# Patient Record
Sex: FEMALE | Employment: FULL TIME | URBAN - METROPOLITAN AREA
[De-identification: names, ages, dates, MRNs, and addresses within clinical notes are randomized per-mention and may not be internally consistent; named-entity substitution may affect disease eponyms.]

---

## 2017-01-16 ENCOUNTER — OFFICE VISIT (OUTPATIENT)
Dept: FAMILY MEDICINE | Facility: CLINIC | Age: 47
End: 2017-01-16
Payer: COMMERCIAL

## 2017-01-16 VITALS
HEART RATE: 75 BPM | BODY MASS INDEX: 29.25 KG/M2 | HEIGHT: 66 IN | WEIGHT: 182 LBS | OXYGEN SATURATION: 98 % | DIASTOLIC BLOOD PRESSURE: 82 MMHG | SYSTOLIC BLOOD PRESSURE: 134 MMHG | TEMPERATURE: 98.8 F

## 2017-01-16 DIAGNOSIS — J01.00 ACUTE MAXILLARY SINUSITIS, RECURRENCE NOT SPECIFIED: Primary | ICD-10-CM

## 2017-01-16 PROCEDURE — 99213 OFFICE O/P EST LOW 20 MIN: CPT | Performed by: NURSE PRACTITIONER

## 2017-01-16 NOTE — NURSING NOTE
"Chief Complaint   Patient presents with     Sick       Initial /82 mmHg  Pulse 75  Temp(Src) 98.8  F (37.1  C) (Oral)  Ht 5' 5.5\" (1.664 m)  Wt 182 lb (82.555 kg)  BMI 29.82 kg/m2  SpO2 98% Estimated body mass index is 29.82 kg/(m^2) as calculated from the following:    Height as of this encounter: 5' 5.5\" (1.664 m).    Weight as of this encounter: 182 lb (82.555 kg).  BP completed using cuff size: kev Torre MA       "

## 2017-01-16 NOTE — PATIENT INSTRUCTIONS
Sinusitis (Antibiotic Treatment)    The sinuses are air-filled spaces within the bones of the face. They connect to the inside of the nose. Sinusitis is an inflammation of the tissue lining the sinus cavity. Sinus inflammation can occur during a cold. It can also be due to allergies to pollens and other particles in the air. Sinusitis can cause symptoms of sinus congestion and fullness. A sinus infection causes fever, headache and facial pain. There is often green or yellow drainage from the nose or into the back of the throat (post-nasal drip). You have been given antibiotics to treat this condition.  Home care:    Take the full course of antibiotics as instructed. Do not stop taking them, even if you feel better.    Drink plenty of water, hot tea, and other liquids. This may help thin mucus. It also may promote sinus drainage.    Heat may help soothe painful areas of the face. Use a towel soaked in hot water. Or,  the shower and direct the hot spray onto your face. Using a vaporizer along with a menthol rub at night may also help.     An expectorant containing guaifenesin may help thin the mucus and promote drainage from the sinuses.    Over-the-counter decongestants may be used unless a similar medicine was prescribed. Nasal sprays work the fastest. Use one that contains phenylephrine or oxymetazoline. First blow the nose gently. Then use the spray. Do not use these medicines more often than directed on the label or symptoms may get worse. You may also use tablets containing pseudoephedrine. Avoid products that combine ingredients, because side effects may be increased. Read labels. You can also ask the pharmacist for help. (NOTE: Persons with high blood pressure should not use decongestants. They can raise blood pressure.)    Over-the-counter antihistamines may help if allergies contributed to your sinusitis.      Do not use nasal rinses or irrigation during an acute sinus infection, unless told to by  your health care provider. Rinsing may spread the infection to other sinuses.    Use acetaminophen or ibuprofen to control pain, unless another pain medicine was prescribed. (If you have chronic liver or kidney disease or ever had a stomach ulcer, talk with your doctor before using these medicines. Aspirin should never be used in anyone under 18 years of age who is ill with a fever. It may cause severe liver damage.)    Don't smoke. This can worsen symptoms.  Follow-up care  Follow up with your healthcare provider or our staff if you are not improving within the next week.  When to seek medical advice  Call your healthcare provider if any of these occur:    Facial pain or headache becoming more severe    Stiff neck    Unusual drowsiness or confusion    Swelling of the forehead or eyelids    Vision problems, including blurred or double vision    Fever of 100.4 F (38 C) or higher, or as directed by your healthcare provider    Seizure    Breathing problems    Symptoms not resolving within 10 days    4690-4197 [employer]. [address] [place] [address], [place] [address]. All rights reserved. This information is not intended as a substitute for professional medical care. Always follow your healthcare professional's instructions.

## 2017-01-16 NOTE — MR AVS SNAPSHOT
After Visit Summary   1/16/2017    Janice Kern    MRN: [id number]           Patient Information     Date Of Birth          1970        Visit Information        Provider Department      1/16/2017 3:00 PM Nimisha Franks [facility]        Today's Diagnoses     Acute maxillary sinusitis, recurrence not specified    -  1       Care Instructions      Sinusitis (Antibiotic Treatment)    The sinuses are air-filled spaces within the bones of the face. They connect to the inside of the nose. Sinusitis is an inflammation of the tissue lining the sinus cavity. Sinus inflammation can occur during a cold. It can also be due to allergies to pollens and other particles in the air. Sinusitis can cause symptoms of sinus congestion and fullness. A sinus infection causes fever, headache and facial pain. There is often green or yellow drainage from the nose or into the back of the throat (post-nasal drip). You have been given antibiotics to treat this condition.  Home care:    Take the full course of antibiotics as instructed. Do not stop taking them, even if you feel better.    Drink plenty of water, hot tea, and other liquids. This may help thin mucus. It also may promote sinus drainage.    Heat may help soothe painful areas of the face. Use a towel soaked in hot water. Or,  the shower and direct the hot spray onto your face. Using a vaporizer along with a menthol rub at night may also help.     An expectorant containing guaifenesin may help thin the mucus and promote drainage from the sinuses.    Over-the-counter decongestants may be used unless a similar medicine was prescribed. Nasal sprays work the fastest. Use one that contains phenylephrine or oxymetazoline. First blow the nose gently. Then use the spray. Do not use these medicines more often than directed on the label or symptoms may get worse. You may also use tablets containing pseudoephedrine. Avoid  products that combine ingredients, because side effects may be increased. Read labels. You can also ask the pharmacist for help. (NOTE: Persons with high blood pressure should not use decongestants. They can raise blood pressure.)    Over-the-counter antihistamines may help if allergies contributed to your sinusitis.      Do not use nasal rinses or irrigation during an acute sinus infection, unless told to by your health care provider. Rinsing may spread the infection to other sinuses.    Use acetaminophen or ibuprofen to control pain, unless another pain medicine was prescribed. (If you have chronic liver or kidney disease or ever had a stomach ulcer, talk with your doctor before using these medicines. Aspirin should never be used in anyone under 18 years of age who is ill with a fever. It may cause severe liver damage.)    Don't smoke. This can worsen symptoms.  Follow-up care  Follow up with your healthcare provider or our staff if you are not improving within the next week.  When to seek medical advice  Call your healthcare provider if any of these occur:    Facial pain or headache becoming more severe    Stiff neck    Unusual drowsiness or confusion    Swelling of the forehead or eyelids    Vision problems, including blurred or double vision    Fever of 100.4 F (38 C) or higher, or as directed by your healthcare provider    Seizure    Breathing problems    Symptoms not resolving within 10 days    6975-6071 [employer]. [address] [place] [address], [place] [address]. All rights reserved. This information is not intended as a substitute for professional medical care. Always follow your healthcare professional's instructions.              Follow-ups after your visit        Who to contact     If you have questions or need follow up information about today's clinic visit or your schedule please contact [facility] directly at [phone].  Normal or non-critical lab and imaging results  "will be communicated to you by e|tabhart, letter or phone within 4 business days after the clinic has received the results. If you do not hear from us within 7 days, please contact the clinic through [employer] or phone. If you have a critical or abnormal lab result, we will notify you by phone as soon as possible.  Submit refill requests through [employer] or call your pharmacy and they will forward the refill request to us. Please allow 3 business days for your refill to be completed.          Additional Information About Your Visit        [employer] Information     [employer] gives you secure access to your electronic health record. If you see a primary care provider, you can also send messages to your care team and make appointments. If you have questions, please call your primary care clinic.  If you do not have a primary care provider, please call [phone] and they will assist you.        Care EveryWhere ID     This is your Care EveryWhere ID. This could be used by other organizations to access your [place] medical records  [id number]        Your Vitals Were     Pulse Temperature Height BMI (Body Mass Index) Pulse Oximetry       75 98.8  F (37.1  C) (Oral) 5' 5.5\" (1.664 m) 29.82 kg/m2 98%        Blood Pressure from Last 3 Encounters:   01/16/17 134/82   11/10/16 139/98   11/02/16 138/92    Weight from Last 3 Encounters:   01/16/17 182 lb (82.555 kg)   11/10/16 178 lb (80.74 kg)   11/02/16 178 lb (80.74 kg)              Today, you had the following     No orders found for display         Today's Medication Changes          These changes are accurate as of: 1/16/17  3:37 PM.  If you have any questions, ask your nurse or doctor.               Start taking these medicines.        Dose/Directions    amoxicillin-clavulanate 875-125 MG per tablet   Commonly known as:  AUGMENTIN   Used for:  Acute maxillary sinusitis, recurrence not specified   Started by:  Nimisha Franks APRN CNP        Dose:  1 tablet   Take 1 " tablet by mouth 2 times daily   Quantity:  20 tablet   Refills:  0            Where to get your medicines      These medications were sent to [id number] - [facility] [address] [place], [place] [address] [facility] [address]    Hours:  24-hours Phone:  [phone]    - amoxicillin-clavulanate 875-125 MG per tablet             Primary Care Provider Office Phone # Fax #    Yaneth Scott [phone]       [facility] [address]   [facility] [address]        Thank you!     Thank you for choosing [facility]  for your care. Our goal is always to provide you with excellent care. Hearing back from our patients is one way we can continue to improve our services. Please take a few minutes to complete the written survey that you may receive in the mail after your visit with us. Thank you!             Your Updated Medication List - Protect others around you: Learn how to safely use, store and throw away your medicines at www.disposemymeds.org.          This list is accurate as of: 1/16/17  3:37 PM.  Always use your most recent med list.                   Brand Name Dispense Instructions for use    amoxicillin-clavulanate 875-125 MG per tablet    AUGMENTIN    20 tablet    Take 1 tablet by mouth 2 times daily       HERBALS          SYNTHROID 25 MCG tablet   Generic drug:  levothyroxine     30 tablet    Take 250 mcg by mouth daily

## 2017-01-16 NOTE — PROGRESS NOTES
"  SUBJECTIVE:                                                    Janice Kern is a 46 year old female who presents to clinic today for the following health issues:      RESPIRATORY SYMPTOMS      Duration: 2016    Description  nasal congestion, rhinorrhea, facial pain/pressure, cough, fever, headache and ear pain. Symptoms are not resolving. Zicam and nettipot aren't helping. Coughing \"all night long.\"    Severity: moderate    Accompanying signs and symptoms: None    History (predisposing factors):  none    Precipitating or alleviating factors: None    Therapies tried and outcome:  Sudafed, essential oils, zycam,        -------------------------------------    Problem list and histories reviewed & adjusted, as indicated.  Additional history: as documented    Patient Active Problem List   Diagnosis     Hashimoto's thyroiditis     CARDIOVASCULAR SCREENING; LDL GOAL LESS THAN 160     Moderate major depression (H)     Migraine     Fevers, unknown origin     Cervical high risk HPV (human papillomavirus) test positive     Menopausal syndrome (hot flashes)     Overweight     Major depressive disorder, recurrent episode, moderate (H)     Anxiety     Past Surgical History   Procedure Laterality Date     Hc excision breast lesion, open >=1  2001     C analgesia,epidural,labor &   , 2005     Laparoscopic appendectomy  2013     Procedure: LAPAROSCOPIC APPENDECTOMY;  Laparoscopic Appendectomy;  Surgeon: Mercy Leal MD;  Location:  OR       Social History   Substance Use Topics     Smoking status: Former Smoker     Smokeless tobacco: Never Used      Comment: quit for 4 years.     Alcohol Use: Yes      Comment: occasionally     Family History   Problem Relation Age of Onset     C.A.D. Maternal Grandfather      HEART DISEASE Maternal Grandfather      DIABETES Paternal Grandfather      CEREBROVASCULAR DISEASE Maternal Grandfather      CANCER Maternal Grandmother      lung " "    CANCER Paternal Grandmother      stomach     Asthma Mother      Psychotic Disorder Mother      Musculoskeletal Disorder Mother      fibromyalgia         Current Outpatient Prescriptions   Medication Sig Dispense Refill     HERBALS        levothyroxine (SYNTHROID) 25 MCG tablet Take 250 mcg by mouth daily  30 tablet 6     FLUoxetine (PROZAC) 10 MG capsule Take one capsule daily for five days then two capsules daily 60 capsule 1     Allergies   Allergen Reactions     Erythromycin Hives     Tape [Adhesive Tape]      Colth tape is the one that gives her the reaction.Burn type reacton , raw.     Penicillin [Esters]      Family alergies to the antibiotic. So it was never given to her.     BP Readings from Last 3 Encounters:   01/16/17 134/82   11/10/16 139/98   11/02/16 138/92    Wt Readings from Last 3 Encounters:   01/16/17 182 lb (82.555 kg)   11/10/16 178 lb (80.74 kg)   11/02/16 178 lb (80.74 kg)                  Labs reviewed in EPIC  Problem list, Medication list, Allergies, and Medical/Social/Surgical histories reviewed in [facility] and updated as appropriate.    ROS:  Constitutional, HEENT, cardiovascular, pulmonary, gi and gu systems are negative, except as otherwise noted.    OBJECTIVE:                                                    /82 mmHg  Pulse 75  Temp(Src) 98.8  F (37.1  C) (Oral)  Ht 5' 5.5\" (1.664 m)  Wt 182 lb (82.555 kg)  BMI 29.82 kg/m2  SpO2 98%  Body mass index is 29.82 kg/(m^2).  General: healthy, alert and moderate distress  Skin: warm and dry.  HEENT:   Ears/TM's: bilateral TM fluid noted  Neck: bilateral anterior cervical nodes enlarged  Nares: congested. Nasal voice.  Posterior orpharynx: mildly erythemetous  Sinuses: + tenderness with palpation  Lungs: chest clear no tachypnea, retractions or cyanosis  Heart: S1, S2 normal, no murmur, no gallop, rate regular       ASSESSMENT/PLAN:                                                    (J01.00) Acute maxillary sinusitis, recurrence " not specified  (primary encounter diagnosis)  Comment: acute  Plan: amoxicillin-clavulanate (AUGMENTIN) 875-125 MG         per tablet        Take antibiotic as prescribed. Symptomatic management (push fluids, good nutrition, MVI if indicated, OTC decongestants, etc). Return prn any problems, questions, or concerns.          HA Boudreaux [facility]

## 2017-01-17 ASSESSMENT — PATIENT HEALTH QUESTIONNAIRE - PHQ9: SUM OF ALL RESPONSES TO PHQ QUESTIONS 1-9: 8

## 2017-01-24 ENCOUNTER — TRANSFERRED RECORDS (OUTPATIENT)
Dept: HEALTH INFORMATION MANAGEMENT | Facility: CLINIC | Age: 47
End: 2017-01-24

## 2017-04-03 ENCOUNTER — TELEPHONE (OUTPATIENT)
Dept: FAMILY MEDICINE | Facility: CLINIC | Age: 47
End: 2017-04-03

## 2017-04-03 ENCOUNTER — OFFICE VISIT (OUTPATIENT)
Dept: FAMILY MEDICINE | Facility: CLINIC | Age: 47
End: 2017-04-03
Payer: COMMERCIAL

## 2017-04-03 VITALS
HEIGHT: 66 IN | DIASTOLIC BLOOD PRESSURE: 86 MMHG | TEMPERATURE: 98.2 F | WEIGHT: 181.25 LBS | SYSTOLIC BLOOD PRESSURE: 120 MMHG | OXYGEN SATURATION: 100 % | HEART RATE: 72 BPM | BODY MASS INDEX: 29.13 KG/M2

## 2017-04-03 DIAGNOSIS — R10.13 ABDOMINAL PAIN, EPIGASTRIC: ICD-10-CM

## 2017-04-03 DIAGNOSIS — R14.2 FLATULENCE, ERUCTATION, AND GAS PAIN: Primary | ICD-10-CM

## 2017-04-03 DIAGNOSIS — R19.7 DIARRHEA, UNSPECIFIED TYPE: ICD-10-CM

## 2017-04-03 DIAGNOSIS — R14.1 FLATULENCE, ERUCTATION, AND GAS PAIN: Primary | ICD-10-CM

## 2017-04-03 DIAGNOSIS — R14.3 FLATULENCE, ERUCTATION, AND GAS PAIN: Primary | ICD-10-CM

## 2017-04-03 DIAGNOSIS — R19.5 DARK STOOLS: ICD-10-CM

## 2017-04-03 LAB
BASOPHILS # BLD AUTO: 0 10E9/L (ref 0–0.2)
BASOPHILS NFR BLD AUTO: 0.3 %
DIFFERENTIAL METHOD BLD: NORMAL
EOSINOPHIL # BLD AUTO: 0.1 10E9/L (ref 0–0.7)
EOSINOPHIL NFR BLD AUTO: 1.3 %
ERYTHROCYTE [DISTWIDTH] IN BLOOD BY AUTOMATED COUNT: 12.5 % (ref 10–15)
HCT VFR BLD AUTO: 42.1 % (ref 35–47)
HGB BLD-MCNC: 14.2 G/DL (ref 11.7–15.7)
LYMPHOCYTES # BLD AUTO: 2.6 10E9/L (ref 0.8–5.3)
LYMPHOCYTES NFR BLD AUTO: 43.7 %
MCH RBC QN AUTO: 29.6 PG (ref 26.5–33)
MCHC RBC AUTO-ENTMCNC: 33.7 G/DL (ref 31.5–36.5)
MCV RBC AUTO: 88 FL (ref 78–100)
MONOCYTES # BLD AUTO: 0.5 10E9/L (ref 0–1.3)
MONOCYTES NFR BLD AUTO: 8.2 %
NEUTROPHILS # BLD AUTO: 2.8 10E9/L (ref 1.6–8.3)
NEUTROPHILS NFR BLD AUTO: 46.5 %
PLATELET # BLD AUTO: 253 10E9/L (ref 150–450)
RBC # BLD AUTO: 4.8 10E12/L (ref 3.8–5.2)
WBC # BLD AUTO: 6 10E9/L (ref 4–11)

## 2017-04-03 PROCEDURE — 99214 OFFICE O/P EST MOD 30 MIN: CPT | Performed by: PHYSICIAN ASSISTANT

## 2017-04-03 PROCEDURE — 85025 COMPLETE CBC W/AUTO DIFF WBC: CPT | Performed by: PHYSICIAN ASSISTANT

## 2017-04-03 PROCEDURE — 36415 COLL VENOUS BLD VENIPUNCTURE: CPT | Performed by: PHYSICIAN ASSISTANT

## 2017-04-03 PROCEDURE — 80053 COMPREHEN METABOLIC PANEL: CPT | Performed by: PHYSICIAN ASSISTANT

## 2017-04-03 NOTE — TELEPHONE ENCOUNTER
Pt denies any cp.  She says she has had intermittent abd pain for the last 2 weeks but now has been constant for the last 2 days.  She rates pain 6-7/10. She says it is not severe when asked.  She says she would have gone into ER if it was and just looking for apt.  Scheduled her in this am.  Shelbi Hartman RN

## 2017-04-03 NOTE — MR AVS SNAPSHOT
After Visit Summary   4/3/2017    Janice Kern    MRN: [id number]           Patient Information     Date Of Birth          1970        Visit Information        Provider Department      4/3/2017 11:20 AM Rohith Baron PA-C [facility]        Today's Diagnoses     Flatulence, eructation, and gas pain    -  1    Abdominal pain, epigastric        Diarrhea, unspecified type        Dark stools           Follow-ups after your visit        Future tests that were ordered for you today     Open Future Orders        Priority Expected Expires Ordered    H Pylori antigen, stool Routine  5/3/2017 4/3/2017    Occult blood fecal HGB immuno Routine  4/3/2018 4/3/2017    US Abdomen Limited Routine  5/18/2017 4/3/2017            Who to contact     If you have questions or need follow up information about today's clinic visit or your schedule please contact [facility] directly at [phone].  Normal or non-critical lab and imaging results will be communicated to you by [employer]ash[employer]art, letter or phone within 4 business days after the clinic has received the results. If you do not hear from us within 7 days, please contact the clinic through [employer] or phone. If you have a critical or abnormal lab result, we will notify you by phone as soon as possible.  Submit refill requests through Tabblo or call your pharmacy and they will forward the refill request to us. Please allow 3 business days for your refill to be completed.          Additional Information About Your Visit        MyChart Information     [employer] gives you secure access to your electronic health record. If you see a primary care provider, you can also send messages to your care team and make appointments. If you have questions, please call your primary care clinic.  If you do not have a primary care provider, please call [phone] and they will assist you.        Care EveryWhere ID     This is your Care EveryWhere ID.  "This could be used by other organizations to access your [place] medical records  [id number]        Your Vitals Were     Pulse Temperature Height Pulse Oximetry Breastfeeding? BMI (Body Mass Index)    72 98.2  F (36.8  C) (Tympanic) 5' 5.5\" (1.664 m) 100% No 29.7 kg/m2       Blood Pressure from Last 3 Encounters:   04/03/17 120/86   01/16/17 134/82   11/10/16 (!) 139/98    Weight from Last 3 Encounters:   04/03/17 181 lb 4 oz (82.2 kg)   01/16/17 182 lb (82.6 kg)   11/10/16 178 lb (80.7 kg)              We Performed the Following     CBC with platelets differential     Comprehensive metabolic panel     DEPRESSION ACTION PLAN (DAP)        Primary Care Provider Office Phone # Fax #    Yaneth Scott [phone]       [facility] [address] [place] [address]        Thank you!     Thank you for choosing [facility]  for your care. Our goal is always to provide you with excellent care. Hearing back from our patients is one way we can continue to improve our services. Please take a few minutes to complete the written survey that you may receive in the mail after your visit with us. Thank you!             Your Updated Medication List - Protect others around you: Learn how to safely use, store and throw away your medicines at www.disposemymeds.org.          This list is accurate as of: 4/3/17 11:49 AM.  Always use your most recent med list.                   Brand Name Dispense Instructions for use    HERBALS          SYNTHROID 25 MCG tablet   Generic drug:  levothyroxine     30 tablet    Take 250 mcg by mouth daily Take  Half on Friday Nothing on Saturdays         "

## 2017-04-03 NOTE — TELEPHONE ENCOUNTER
Reason for call:  Patient reporting a symptom    Symptom or request: Aching & pressure     Duration (how long have symptoms been present): Last couple of weeks per pt    Have you been treated for this before? Not sure    Additional comments: Janice BROOKLYNN Kern called in wishing to be advised on some chest pain she has been having with aching and pressure in her chest from rib cage area to her belly button per patient. Please call and advise. Thank you.    Phone Number patient can be reached at:  Cell number on file:    Telephone Information:   Mobile [phone]       Best Time:  N/A    Can we leave a detailed message on this number:  YES    Call taken on 4/3/2017 at 10:07 AM by Mindi Morrow

## 2017-04-03 NOTE — PROGRESS NOTES
SUBJECTIVE:                                                    Janice Kern is a 46 year old female who presents to clinic today for the following health issues:      Abdominal Pain      Duration:  roughly two weeks    Description (location/character/radiation):  Upper Abdominal pain , rib cage, lateral sides       Associated flank pain: None    Intensity:  mild, moderate, severe- sometimes sharp pain    Accompanying signs and symptoms:        Fever/Chills: no patient has hot flashes, did have chills yeretesday       Gas/Bloating: YES- bloated       Nausea/vomitting: YES- nausea       Diarrhea: YES- loose, some times 5x or 1x  stool is turned dark in color       Dysuria or Hematuria: no     History (previous similar pain/trauma/previous testing):  none    Precipitating or alleviating factors:       Pain worse with eating/BM/urination: no       Pain relieved by BM: no     Therapies tried and outcome: None    LMP:   4.5 months   All.MICHAEL Garnica          Problem list and histories reviewed & adjusted, as indicated.  Additional history: this started as just an aching nagging kind of pain.  Mild rib pain, so she though underwire bra making worse, so she did switch.  No change.  Did have a couple of episodes of acute pain, but goes away.  It can be associated with diarrhea.  Over the last couple of days, this is getting worse, and more constant.  Stools seem to be a little darker and softer.  This is unusual, as she is usually constipated.      No correlation with food, although the bad time it was after chicken salad.      She has been having more hot flashes and working with herbalist and that was helping.    BP Readings from Last 3 Encounters:   04/03/17 120/86   01/16/17 134/82   11/10/16 (!) 139/98    Wt Readings from Last 3 Encounters:   04/03/17 181 lb 4 oz (82.2 kg)   01/16/17 182 lb (82.6 kg)   11/10/16 178 lb (80.7 kg)                    Reviewed and updated as needed this visit by clinical staff      "  Reviewed and updated as needed this visit by Provider         ROS:  Constitutional, HEENT, cardiovascular, pulmonary, gi and gu systems are negative, except as otherwise noted.    OBJECTIVE:                                                    /86 (BP Location: Left arm, Patient Position: Left side, Cuff Size: Adult Large)  Pulse 72  Temp 98.2  F (36.8  C) (Tympanic)  Ht 5' 5.5\" (1.664 m)  Wt 181 lb 4 oz (82.2 kg)  SpO2 100%  Breastfeeding? No  BMI 29.7 kg/m2  Body mass index is 29.7 kg/(m^2).  GENERAL: alert and no distress  EYES: Eyes grossly normal to inspection  HENT: ear canals and TM's normal, nose and mouth without ulcers or lesions  RESP: lungs clear to auscultation - no rales, rhonchi or wheezes  CV: regular rate and rhythm, normal S1 S2, no S3 or S4, no murmur, click or rub, no peripheral edema and peripheral pulses strong  PSYCH: mentation appears normal, affect normal/bright    Diagnostic Test Results:  none      ASSESSMENT/PLAN:                                                            1. Flatulence, eructation, and gas pain  Larger differential, but will rule out gall bladder pathology.    - CBC with platelets differential  - Comprehensive metabolic panel  - US Abdomen Limited; Future    2. Abdominal pain, epigastric    - CBC with platelets differential  - Comprehensive metabolic panel  - US Abdomen Limited; Future    3. Diarrhea, unspecified type    - CBC with platelets differential  - US Abdomen Limited; Future    4. Dark stools  Some concern that this may be blood, will screen for that along with h pylori.    - H Pylori antigen, stool; Future  - Occult blood fecal HGB immuno; Future        Rohith Baron PA-C  [facility]    "

## 2017-04-03 NOTE — NURSING NOTE
"Chief Complaint   Patient presents with     Abdominal Pain     /86 (BP Location: Left arm, Patient Position: Left side, Cuff Size: Adult Large)  Pulse 72  Temp 98.2  F (36.8  C) (Tympanic)  Ht 5' 5.5\" (1.664 m)  Wt 181 lb 4 oz (82.2 kg)  SpO2 100%  Breastfeeding? No  BMI 29.7 kg/m2 Estimated body mass index is 29.7 kg/(m^2) as calculated from the following:    Height as of this encounter: 5' 5.5\" (1.664 m).    Weight as of this encounter: 181 lb 4 oz (82.2 kg).  bp completed using cuff size: large      Health Maintenance addressed:  NONE    n/a              "

## 2017-04-04 ENCOUNTER — HOSPITAL ENCOUNTER (OUTPATIENT)
Dept: ULTRASOUND IMAGING | Facility: CLINIC | Age: 47
Discharge: HOME | End: 2017-04-04
Attending: PHYSICIAN ASSISTANT | Admitting: PHYSICIAN ASSISTANT
Payer: COMMERCIAL

## 2017-04-04 DIAGNOSIS — R19.7 DIARRHEA, UNSPECIFIED TYPE: ICD-10-CM

## 2017-04-04 DIAGNOSIS — R14.1 FLATULENCE, ERUCTATION, AND GAS PAIN: ICD-10-CM

## 2017-04-04 DIAGNOSIS — R14.3 FLATULENCE, ERUCTATION, AND GAS PAIN: ICD-10-CM

## 2017-04-04 DIAGNOSIS — R10.13 ABDOMINAL PAIN, EPIGASTRIC: ICD-10-CM

## 2017-04-04 DIAGNOSIS — R19.5 DARK STOOLS: ICD-10-CM

## 2017-04-04 DIAGNOSIS — R14.2 FLATULENCE, ERUCTATION, AND GAS PAIN: ICD-10-CM

## 2017-04-04 LAB
ALBUMIN SERPL-MCNC: 4.2 G/DL (ref 3.4–5)
ALP SERPL-CCNC: 91 U/L (ref 40–150)
ALT SERPL W P-5'-P-CCNC: 28 U/L (ref 0–50)
ANION GAP SERPL CALCULATED.3IONS-SCNC: 5 MMOL/L (ref 3–14)
AST SERPL W P-5'-P-CCNC: 12 U/L (ref 0–45)
BILIRUB SERPL-MCNC: 0.5 MG/DL (ref 0.2–1.3)
BUN SERPL-MCNC: 11 MG/DL (ref 7–30)
CALCIUM SERPL-MCNC: 9.7 MG/DL (ref 8.5–10.1)
CHLORIDE SERPL-SCNC: 105 MMOL/L (ref 94–109)
CO2 SERPL-SCNC: 30 MMOL/L (ref 20–32)
CREAT SERPL-MCNC: 0.83 MG/DL (ref 0.52–1.04)
GFR SERPL CREATININE-BSD FRML MDRD: 73 ML/MIN/1.7M2
GLUCOSE SERPL-MCNC: 83 MG/DL (ref 70–99)
POTASSIUM SERPL-SCNC: 3.9 MMOL/L (ref 3.4–5.3)
PROT SERPL-MCNC: 7.7 G/DL (ref 6.8–8.8)
SODIUM SERPL-SCNC: 140 MMOL/L (ref 133–144)

## 2017-04-04 PROCEDURE — 87338 HPYLORI STOOL AG IA: CPT | Performed by: PHYSICIAN ASSISTANT

## 2017-04-04 PROCEDURE — 76705 ECHO EXAM OF ABDOMEN: CPT

## 2017-04-05 LAB
H PYLORI AG STL QL IA: NORMAL
MICRO REPORT STATUS: NORMAL
SPECIMEN SOURCE: NORMAL

## 2017-04-05 NOTE — PROGRESS NOTES
Dear Janice    Your test results are attached, feel free to contact me via [employer] .    Overall good news on your ultrasound.  There is an old polyp on your gall bladder that was seen on a previous CT and is not changed.  This is most likely not causing your symptoms.  No other abnormalities.      Ben Baron PA-C

## 2017-04-05 NOTE — PROGRESS NOTES
Dear Janice    Your test results are attached, feel free to contact me via [employer]     Your blood work has come back in the expected range.  No sign of infection, liver stress, or anemia.  I will await your stool cultures.      Ben Baron PA-C

## 2017-05-31 ENCOUNTER — VIRTUAL VISIT (OUTPATIENT)
Dept: FAMILY MEDICINE | Facility: OTHER | Age: 47
End: 2017-05-31

## 2017-05-31 NOTE — PROGRESS NOTES
"Date:   Clinician: Savana Rivas  Clinician NPI: [id number]  Patient: Janice Kern  Patient : 1970  Patient Address: [address] [place] [address]  Patient Phone: [phone]  Visit Protocol: URI  Patient Summary:  Janice is a 46 year old ( : 1970 ) female who initiated a Visit for suspected Strep throat. When asked the question \"Please sign me up to receive news, health information and promotions. \", Janice responded \"No\".     Her symptoms started gradually 1 week ago and consist of hoarse voice, malaise, chills, dysphagia, myalgias, and sore throat.   She denies rhinitis, headache, nasal congestion, post-nasal drainage, fever, facial pain or pressure, loss of appetite, itchy eyes, cough, chest pain, ear pain, dyspnea, nausea, petechial or purpuric rash, and vomiting. She denies a history of facial surgery.    She has a moderately painful sore throat. When Janice swallows liquids or saliva, she experiences moderate pain. The patient denies having white spots on the tonsils similar to a sample strep throat image provided. She has been exposed via someone who was not a close contact of family member to Strep. Their Strep diagnosis was confirmed via throat swab. When asked to feel her neck she reported enlarged lymph nodes. Janice noted that the enlarged neck lymph nodes were first noticed when prompted to check for lymphadenopathy. She denies axillary lymphadenopathy.   She denies rigors.   Janice denies having COPD or other chronic lung disease.   Pulse: self-reported pulse rate as: 12 beats in 10 seconds.    Weight (in lbs): 180   She states she is not pregnant and denies breastfeeding. She no longer menstruates.   Janice does not smoke or use smokeless tobacco.  MEDICATIONS:  Thyroid  , ALLERGIES:   Z-pack/azithromycin/clarithromycin/erythromycin and penicillin/amoxicillin/augmentin    Clinician Response:  Dear Janice,  You have told us that you are " having sore throat pain, and your interview suggests that you may need a throat swab to determine whether or not you have strep throat. For this reason, I cannot safely provide online care for you today. Please be seen in the next 1-2 days in a clinic or urgent care for a rapid strep test. You will not be charged for this Visit. Thanks for using [employer].   Diagnosis: Unable to diagnose - Strep Pharyngitis  Diagnosis ICD: R69  Additional Clinician Notes: With your multiple antibiotic allergies you should be seen for definitive diagnosis and treatment plan.

## 2017-07-20 ENCOUNTER — RADIANT APPOINTMENT (OUTPATIENT)
Dept: MAMMOGRAPHY | Facility: CLINIC | Age: 47
End: 2017-07-20
Attending: FAMILY MEDICINE
Payer: COMMERCIAL

## 2017-07-20 DIAGNOSIS — Z12.31 VISIT FOR SCREENING MAMMOGRAM: ICD-10-CM

## 2017-07-20 PROCEDURE — G0202 SCR MAMMO BI INCL CAD: HCPCS

## 2017-10-04 ENCOUNTER — TELEPHONE (OUTPATIENT)
Dept: FAMILY MEDICINE | Facility: CLINIC | Age: 47
End: 2017-10-04

## 2017-10-04 ENCOUNTER — MYC MEDICAL ADVICE (OUTPATIENT)
Dept: FAMILY MEDICINE | Facility: CLINIC | Age: 47
End: 2017-10-04

## 2017-10-04 DIAGNOSIS — M67.40 GANGLION CYST: Primary | ICD-10-CM

## 2017-10-04 NOTE — TELEPHONE ENCOUNTER
Left non detailed VM for patient informing her provider placed referral instead and she should check her MyChart for referral info  Sent MyChart message to pt.  Carole BARKER RN

## 2017-10-04 NOTE — TELEPHONE ENCOUNTER
Pt called back stats she did not want/need a referral   States she already has a place she is going to

## 2017-10-04 NOTE — TELEPHONE ENCOUNTER
Called patient to make sure everything is correct  Patient states she did not need a referral - already has a surgeon  She needed preop with Dr Scott for surgery that is scheduled 11/16/2017  Is scheduled for 10/27/2017 but appt notes say cyst removal which LS cannot do   Pt said appt notes should state preop - edited notes  Nothing further needed at this time.  Carole BARKER RN

## 2017-10-04 NOTE — TELEPHONE ENCOUNTER
Message                ----- Message -----        From: Elzbieta Kern        Sent: 10/4/2017  12:06 PM          To: Up Reception     Subject: RE: Appointment scheduled from [facility]                 I saw one dr who was on call over two years ago for the finger. He said it would go away.     At my last appt with Dr Scott, she referred me to a dermatologist because it hadn't.     The dermatologist referred me to the orthopedist.      The cyst is on my left middle finger at the knuckle.     I need to get this taken care of this year because of my insurance deductible. I don't have a lot of time to mess around and my work travel schedule is tight. Please approve the appt with Dr Scott so I can get this taken care of.          ThanksElzbieta     ----- Message -----     From: Shadia ARIZA     Sent: 10/4/2017 12:02 PM CDT     To: Elzbieta Kern     Subject: RE: Appointment scheduled from [facility]          Bonilla Camacho,          I will check with doctor Tyler to see if she would remove this cycst, Usually our providers refer to dermatology for these type of things. What location is this cycst at? Have you seen her for this before or is it a new thing?                    Thanks     Nuzhat STRATTON          ----- Message -----        From: Elzbieta Kern        Sent: 10/3/2017  4:30 PM CDT          To: Patient Appointment Schedule Request Mailing List     Subject: Appointment scheduled from [facility]          Appointment For: ELZBIETA KERN ([id number])     Visit Type: [facility] OFFICE VISIT LONG (907)          10/27/2017   8:30 AM  30 mins.  Yaneth Scott MD         [facility]          Patient Comments:     Primary Care     need to have a mucous cycst removed.

## 2017-10-27 ENCOUNTER — OFFICE VISIT (OUTPATIENT)
Dept: FAMILY MEDICINE | Facility: CLINIC | Age: 47
End: 2017-10-27
Payer: COMMERCIAL

## 2017-10-27 VITALS
RESPIRATION RATE: 16 BRPM | SYSTOLIC BLOOD PRESSURE: 120 MMHG | WEIGHT: 178 LBS | HEIGHT: 66 IN | OXYGEN SATURATION: 99 % | HEART RATE: 104 BPM | DIASTOLIC BLOOD PRESSURE: 80 MMHG | TEMPERATURE: 98.2 F | BODY MASS INDEX: 28.61 KG/M2

## 2017-10-27 DIAGNOSIS — Z01.818 PREOP GENERAL PHYSICAL EXAM: Primary | ICD-10-CM

## 2017-10-27 DIAGNOSIS — M67.449 MUCOUS CYST OF FINGER: ICD-10-CM

## 2017-10-27 PROCEDURE — 99214 OFFICE O/P EST MOD 30 MIN: CPT | Performed by: FAMILY MEDICINE

## 2017-10-27 NOTE — PROGRESS NOTES
[facility]  [address]  [facility] [address] [phone]  Dept: [phone]    PRE-OP EVALUATION:  Today's date: 10/27/2017    Janice GARZA (: 1970) presents for pre-operative evaluation assessment as requested by Dr. Chester Gatyan.  She requires evaluation and anesthesia risk assessment prior to undergoing surgery/procedure for treatment of cyst .  Proposed procedure: remove cyst on left middle finger    Date of Surgery/ Procedure: 17  Time of Surgery/ Procedure: 7:30am  Hospital/Surgical Facility: [facility]  Fax number for surgical facility:   Primary Physician: Yaneth Scott  Type of Anesthesia Anticipated: to be determined    Patient has a Health Care Directive or Living Will:  NO    1. NO - Do you have a history of heart attack, stroke, stent, bypass or surgery on an artery in the head, neck, heart or legs?  2. NO - Do you ever have any pain or discomfort in your chest?  3. NO - Do you have a history of  Heart Failure?  4. NO - Are you troubled by shortness of breath when: walking on the level, up a slight hill or at night?  5. NO - Do you currently have a cold, bronchitis or other respiratory infection?  6. NO - Do you have a cough, shortness of breath or wheezing?  7. NO - Do you sometimes get pains in the calves of your legs when you walk?  8. yes - Do you or anyone in your family have previous history of blood clots?  9. NO - Do you or does anyone in your family have a serious bleeding problem such as prolonged bleeding following surgeries or cuts?  10. NO - Have you ever had problems with anemia or been told to take iron pills?  11. NO - Have you had any abnormal blood loss such as black, tarry or bloody stools, or abnormal vaginal bleeding?  12. NO - Have you ever had a blood transfusion?  13. yes - Have you or any of your relatives ever had problems with anesthesia?  14. NO - Do you have sleep apnea, excessive snoring or daytime drowsiness?  15.  NO - Do you have any prosthetic heart valves?  16. NO - Do you have prosthetic joints?  17. NO - Is there any chance that you may be pregnant?        HPI:                                                      Brief HPI related to upcoming procedure: yes      See problem list for active medical problems.  Problems all longstanding and stable, except as noted/documented.  See ROS for pertinent symptoms related to these conditions.                                                                                                  .    MEDICAL HISTORY:                                                    Patient Active Problem List    Diagnosis Date Noted     Anxiety 2016     Priority: Medium     Menopausal syndrome (hot flashes) 2016     Priority: Medium     Overweight 2016     Priority: Medium     Major depressive disorder, recurrent episode, moderate (H) 2016     Priority: Medium     Cervical high risk HPV (human papillomavirus) test positive 10/08/2014     Priority: Medium     10/8/14: NIL pap, + HPV (not 16 or 18). Plan cotest pap & HPV in 1 year.  Tracking started.  2016 Pap NIL, neg HPV, cotest three years         Fevers, unknown origin 2013     Priority: Medium     Problem list name updated by automated process. Provider to review and confirm       Migraine 2012     Priority: Medium     Moderate major depression (H) 2012     Priority: Medium     CARDIOVASCULAR SCREENING; LDL GOAL LESS THAN 160 10/31/2010     Priority: Medium     Hashimoto's thyroiditis 10/08/2010     Priority: Medium      Past Medical History:   Diagnosis Date     Cervical high risk HPV (human papillomavirus) test positive 10/2014    NIL pap, + HPV (not 16 or 18) '16 HPV neg     Hashimoto's thyroiditis 10/8/2010     Past Surgical History:   Procedure Laterality Date     C ANALGESIA,EPIDURAL,LABOR &   , 2005     HC EXCISION BREAST LESION, OPEN >=1  2001     LAPAROSCOPIC APPENDECTOMY   "4/11/2013    Procedure: LAPAROSCOPIC APPENDECTOMY;  Laparoscopic Appendectomy;  Surgeon: Mercy Leal MD;  Location: [facility] OR     Current Outpatient Prescriptions   Medication Sig Dispense Refill     HERBALS        levothyroxine (SYNTHROID) 25 MCG tablet Take 250 mcg by mouth daily Take  Half on Friday  Nothing on Saturdays 30 tablet 6     OTC products: None, except as noted above    Allergies   Allergen Reactions     Erythromycin Hives     Tape [Adhesive Tape]      Colth tape is the one that gives her the reaction.Burn type reacton , raw.     Penicillin [Esters]      Family alergies to the antibiotic. So it was never given to her.      Latex Allergy: NO BUT is allergic to tape     Social History   Substance Use Topics     Smoking status: Former Smoker     Smokeless tobacco: Never Used      Comment: quit for 4 years.     Alcohol use Yes      Comment: occasionally     History   Drug Use No       REVIEW OF SYSTEMS:                                                    Constitutional, neuro, ENT, endocrine, pulmonary, cardiac, gastrointestinal, genitourinary, musculoskeletal, integument and psychiatric systems are negative, except as otherwise noted.      EXAM:                                                    /80 (BP Location: Left arm, Cuff Size: Adult Regular)  Pulse 104  Temp 98.2  F (36.8  C) (Oral)  Resp 16  Ht 5' 5.5\" (1.664 m)  Wt 178 lb (80.7 kg)  SpO2 99%  BMI 29.17 kg/m2    GENERAL APPEARANCE: healthy, alert and no distress     EYES: EOMI, PERRL     HENT: ear canals and TM's normal and nose and mouth without ulcers or lesions     NECK: no adenopathy, no asymmetry, masses, or scars and thyroid normal to palpation     RESP: lungs clear to auscultation - no rales, rhonchi or wheezes     CV: regular rates and rhythm, normal S1 S2, no S3 or S4 and no murmur, click or rub     ABDOMEN:  soft, nontender, no HSM or masses and bowel sounds normal     MS: extremities normal- no gross " deformities noted, no evidence of inflammation in joints, FROM in all extremities.     SKIN: no suspicious lesions or rashes     NEURO: Normal strength and tone, sensory exam grossly normal, mentation intact and speech normal     PSYCH: mentation appears normal. and affect normal/bright     LYMPHATICS: No axillary, cervical, or supraclavicular nodes    DIAGNOSTICS:                                                    No labs or EKG required for low risk surgery (cataract, skin procedure, breast biopsy, etc)    Recent Labs   Lab Test  04/03/17   1151  03/04/15   1356   04/20/13   1308   HGB  14.2  13.9   < >  13.3   PLT  253  251   < >  183   INR   --    --    --   1.04   NA  140  139   < >  144   POTASSIUM  3.9  3.9   < >  4.1   CR  0.83  0.77   < >  0.86    < > = values in this interval not displayed.        IMPRESSION:                                                    Reason for surgery/procedure: Mucous cyst on her finger , Surgical removal   Diagnosis/reason for consult: preoperative clearing     The proposed surgical procedure is considered LOW risk.    REVISED CARDIAC RISK INDEX  The patient has the following serious cardiovascular risks for perioperative complications such as (MI, PE, VFib and 3  AV Block):  No serious cardiac risks      The patient has the following additional risks for perioperative complications:  No identified additional risks      ICD-10-CM    1. Preop general physical exam Z01.818    2. Mucous cyst of finger M67.449        RECOMMENDATIONS:                                                          --Patient is to take all scheduled medications on the day of surgery EXCEPT for modifications listed below.    APPROVAL GIVEN to proceed with proposed procedure, without further diagnostic evaluation       Signed Electronically by: Yaneth Scott MD    Copy of this evaluation report is provided to requesting physician.    [facility] Preop Guidelines

## 2017-10-27 NOTE — MR AVS SNAPSHOT
After Visit Summary   10/27/2017    Janice GARZA    MRN: [id number]           Patient Information     Date Of Birth          1970        Visit Information        Provider Department      10/27/2017 8:30 AM Yaneth Scott MD [facility]        Today's Diagnoses     Preop general physical exam    -  1    Mucous cyst of finger          Care Instructions      Before Your Surgery      Call your surgeon if there is any change in your health. This includes signs of a cold or flu (such as a sore throat, runny nose, cough, rash or fever).    Do not smoke, drink alcohol or take over the counter medicine (unless your surgeon or primary care doctor tells you to) for the 24 hours before and after surgery.    If you take prescribed drugs: Follow your doctor s orders about which medicines to take and which to stop until after surgery.    Eating and drinking prior to surgery: follow the instructions from your surgeon    Take a shower or bath the night before surgery. Use the soap your surgeon gave you to gently clean your skin. If you do not have soap from your surgeon, use your regular soap. Do not shave or scrub the surgery site.  Wear clean pajamas and have clean sheets on your bed.           Follow-ups after your visit        Who to contact     If you have questions or need follow up information about today's clinic visit or your schedule please contact [facility] directly at [phone].  Normal or non-critical lab and imaging results will be communicated to you by MyChart, letter or phone within 4 business days after the clinic has received the results. If you do not hear from us within 7 days, please contact the clinic through MyChart or phone. If you have a critical or abnormal lab result, we will notify you by phone as soon as possible.  Submit refill requests through [employer] or call your pharmacy and they will forward the refill request to us. Please allow 3 business days for  "your refill to be completed.          Additional Information About Your Visit        MyChart Information     [employer] gives you secure access to your electronic health record. If you see a primary care provider, you can also send messages to your care team and make appointments. If you have questions, please call your primary care clinic.  If you do not have a primary care provider, please call [phone] and they will assist you.        Care EveryWhere ID     This is your Care EveryWhere ID. This could be used by other organizations to access your [place] medical records  [id number]        Your Vitals Were     Pulse Temperature Respirations Height Pulse Oximetry BMI (Body Mass Index)    104 98.2  F (36.8  C) (Oral) 16 5' 5.5\" (1.664 m) 99% 29.17 kg/m2       Blood Pressure from Last 3 Encounters:   10/27/17 120/80   04/03/17 120/86   01/16/17 134/82    Weight from Last 3 Encounters:   10/27/17 178 lb (80.7 kg)   04/03/17 181 lb 4 oz (82.2 kg)   01/16/17 182 lb (82.6 kg)              Today, you had the following     No orders found for display       Primary Care Provider Office Phone # Fax #    Yaneth Scott [phone]       [facility] [address]        Equal Access to Services     LUIZA ASHFORD : Hadii aad ku hadasho Soomaali, waaxda luqadaha, qaybta kaalmada adeegyada, leah whiting. So [facility] [place] [phone].    ATENCIÓN: Si habla español, tiene a patel disposición servicios gratuitos de asistencia lingüística. Llame al [phone].    We comply with applicable federal civil rights laws and [place] laws. We do not discriminate on the basis of race, color, national origin, age, disability, sex, sexual orientation, or gender identity.            Thank you!     Thank you for choosing [facility]  for your care. Our goal is always to provide you with excellent care. Hearing back from our patients is one way we can continue to improve our " services. Please take a few minutes to complete the written survey that you may receive in the mail after your visit with us. Thank you!             Your Updated Medication List - Protect others around you: Learn how to safely use, store and throw away your medicines at www.disposemymeds.org.          This list is accurate as of: 10/27/17  9:05 AM.  Always use your most recent med list.                   Brand Name Dispense Instructions for use Diagnosis    HERBALS           SYNTHROID 25 MCG tablet   Generic drug:  levothyroxine     30 tablet    Take 250 mcg by mouth daily Take  Half on Friday Nothing on Saturdays    Hypothyroidism

## 2017-11-14 ENCOUNTER — TELEPHONE (OUTPATIENT)
Dept: FAMILY MEDICINE | Facility: CLINIC | Age: 47
End: 2017-11-14

## 2017-11-14 NOTE — TELEPHONE ENCOUNTER
Reason for Call:  Pre-Op Paperwork    Detailed comments: Please fax over her Pre-Op Paperwork to the [facility]  [facility] Fax # [phone]  Thank you        Call taken on 11/14/2017 at 2:01 PM by Tyrone Rubio

## 2017-11-16 ENCOUNTER — HOSPITAL PATHOLOGY (OUTPATIENT)
Dept: OTHER | Facility: CLINIC | Age: 47
End: 2017-11-16

## 2017-11-20 LAB — COPATH REPORT: NORMAL

## 2018-01-31 ENCOUNTER — VIRTUAL VISIT (OUTPATIENT)
Dept: FAMILY MEDICINE | Facility: OTHER | Age: 48
End: 2018-01-31

## 2018-01-31 ENCOUNTER — E-VISIT (OUTPATIENT)
Dept: FAMILY MEDICINE | Facility: CLINIC | Age: 48
End: 2018-01-31
Payer: COMMERCIAL

## 2018-01-31 DIAGNOSIS — H10.31 ACUTE CONJUNCTIVITIS OF RIGHT EYE, UNSPECIFIED ACUTE CONJUNCTIVITIS TYPE: Primary | ICD-10-CM

## 2018-01-31 PROCEDURE — 99444 ZZC PHYSICIAN ONLINE EVALUATION & MANAGEMENT SERVICE: CPT | Performed by: FAMILY MEDICINE

## 2018-01-31 NOTE — PROGRESS NOTES
"Date:   Clinician: Muna Huynh  Clinician NPI: [id number]  Patient: Janice Kern  Patient : 1970  Patient Address: [address] [place] [address]  Patient Phone: [phone]  Visit Protocol: Eye conditions  Patient Summary:  Janice is a 47 year old (: 1970 ) female who initiated a Visit for conjunctivitis.  When asked the question \"Please sign me up to receive news, health information and promotions. \", Janice responded \"No\".    Images of her eye condition were uploaded.   Her symptoms started today and affect the left eye. The symptoms consist of itchy eye(s), eye redness, eyelid swelling, eye pain, and drainage coming from the eye(s). Additionally, her vision has become more blurry since her symptoms started, but it's possible the blurry vision is caused by the eyelid swelling and/or drainage coming from the eye(s).   Symptom details     Drainage: The color of the drainage coming out of her eye(s) is green. The drainage is watery and causes her eyelids to be stuck shut in the morning.    Itchiness: Janice does not have seasonal allergies or hay fever.    Eye pain: She is experiencing mild eye pain. She has taken over-the-counter medication for the pain.     Denied symptoms include bumps on the eyelid and light sensitivity. Janice does not have subconjunctival hemorrhage. She does not feel feverish.    Janice has not had a recent diagnosis of conjunctivitis. She also has not had a recent eye injury, foreign body in the eye(s), eye surgery, and cold or ear infection. It is not known if Janice has recently been exposed to someone with a red eye or an eye infection. She does not wear contact lenses. Janice has not ever been diagnosed with glaucoma.   Janice has been using medication to treat her current symptoms. Medication used to treat current symptoms as reported by the patient (free text): Pink Eye Relief   Reasoning for seeking additional care " as reported by the patient (free text): I'm not sure the Pink Eye Relief is right and because the drainage was greenish, may be infected?   Janice does not require proof of evaluation of her eye condition before returning to school, work, or .   Janice does not smoke or use smokeless tobacco.   She denies pregnancy and denies breastfeeding. She does not menstruate.   Additional information as reported by the patient (free text): I received a note from my son's school that [facility] is going around. I am certain that the reason my vision is blurred is from the excessive drainage. The first time I cleaned the gunk out of my eye it was green. I have not had that color since. My eye was crusted shut this morning when I woke.   MEDICATIONS:  Unspecified thyroid medication  , ALLERGIES:   Z-charmaine/azithromycin/clarithromycin/erythromycin    Clinician Response:  Dear Janice,  Based on the information provided, you most likely have bacterial conjunctivitis, more commonly called pink eye.  I am prescribing:  Polymyxin B-trimethoprim (Polytrim) 10,000 units-1mg 1ml ophthalmic solution. Apply 1 drop into the affected eye(s) every 3 hours, up to 6 times a day for 7 days.  The medication I prescribed is an antibiotic medication. Infections can be caused by either bacteria or a virus, and often have similar symptoms, so it is possible that this is a viral infection. Antibiotics are only effective against bacterial infections, so when it is caused by a virus, the medication will not help symptoms improve or make it less contagious.  To reduce the spread of the eye infection, you should not use eye makeup until the infection has fully resolved, and be sure to wash your hands at least once per hour and avoid touching the eyes as much as possible.  The following will reduce the risk for future eye infections:     Frequent handwashing    Replace towels and washcloths daily    Do not share towels and washcloths with others     Replace eye makeup used while eyes were infected    Do not use anyone else's eye makeup     Follow up with your provider if you are taking your medication as directed and do not see any improvements after 2 days. Be seen earlier if you develop any new or worsening symptoms.   Diagnosis: Bacterial conjunctivitis  Diagnosis ICD: H10.9  Prescription: polymyxin B/trimethoprim (Polytrim) 10,000 units-1mg 1ml ophthalmic solution 10 ml, 7 days supply. Apply 1 drop into the affected eye(s) every 3 hours up to 6 times a day for 7 days. Refills: 0, Refill as needed: no, Allow substitutions: yes  Pharmacy: [id number] - [phone] - [address] [place] [address]

## 2018-01-31 NOTE — MR AVS SNAPSHOT
After Visit Summary   1/31/2018    Janice Morgan    MRN: [id number]           Patient Information     Date Of Birth          1970        Visit Information        Provider Department      1/31/2018 8:27 AM Yaneth Scott MD [facility]        Today's Diagnoses     Acute conjunctivitis of right eye, unspecified acute conjunctivitis type    -  1       Follow-ups after your visit        Who to contact     If you have questions or need follow up information about today's clinic visit or your schedule please contact [facility] directly at [phone].  Normal or non-critical lab and imaging results will be communicated to you by [employer], letter or phone within 4 business days after the clinic has received the results. If you do not hear from us within 7 days, please contact the clinic through [employer] or phone. If you have a critical or abnormal lab result, we will notify you by phone as soon as possible.  Submit refill requests through [email or web address] or call your pharmacy and they will forward the refill request to us. Please allow 3 business days for your refill to be completed.          Additional Information About Your Visit        MyChart Information     [email or web address] gives you secure access to your electronic health record. If you see a primary care provider, you can also send messages to your care team and make appointments. If you have questions, please call your primary care clinic.  If you do not have a primary care provider, please call [phone] and they will assist you.        Care EveryWhere ID     This is your Care EveryWhere ID. This could be used by other organizations to access your [facility] medical records  [id number]         Blood Pressure from Last 3 Encounters:   10/27/17 120/80   04/03/17 120/86   01/16/17 134/82    Weight from Last 3 Encounters:   10/27/17 178 lb (80.7 kg)   04/03/17 181 lb 4 oz (82.2 kg)   01/16/17 182 lb (82.6 kg)              Today, you had  the following     No orders found for display         Today's Medication Changes          These changes are accurate as of 1/31/18 11:59 PM.  If you have any questions, ask your nurse or doctor.               Start taking these medicines.        Dose/Directions    tobramycin 0.3 % ophthalmic solution   Commonly known as:  TOBREX   Used for:  Acute conjunctivitis of right eye, unspecified acute conjunctivitis type   Started by:  Yaneth Scott MD        Dose:  1 drop   Apply 1 drop to eye every 4 hours for 7 days   Quantity:  1 Bottle   Refills:  0            Where to get your medicines      These medications were sent to [facility] [address] [place] [address] AT [facility] & [address] [place] [id number]     Phone:  [phone]     tobramycin 0.3 % ophthalmic solution                Primary Care Provider Office Phone # Fax #    Yaneth Scott [phone] [address] [address]        Equal Access to Services     [facility]: Hadii aad ku hadasho Soomaali, waaxda luqadaha, qaybta kaalmada adeegyada, waxay idiin haykimberlyn ron telles . So [facility] [phone].    ATENCIÓN: Si habla español, tiene a patel disposición servicios gratuitos de asistencia lingüística. Llame al [phone].    We comply with applicable federal civil rights laws and [place] laws. We do not discriminate on the basis of race, color, national origin, age, disability, sex, sexual orientation, or gender identity.            Thank you!     Thank you for choosing [facility]  for your care. Our goal is always to provide you with excellent care. Hearing back from our patients is one way we can continue to improve our services. Please take a few minutes to complete the written survey that you may receive in the mail after your visit with us. Thank you!             Your Updated Medication List - Protect others around you: Learn  how to safely use, store and throw away your medicines at www.disposemymeds.org.          This list is accurate as of 1/31/18 11:59 PM.  Always use your most recent med list.                   Brand Name Dispense Instructions for use Diagnosis    HERBALS           SYNTHROID 25 MCG tablet   Generic drug:  levothyroxine     30 tablet    Take 250 mcg by mouth daily Take  Half on Friday Nothing on Saturdays    Hypothyroidism       tobramycin 0.3 % ophthalmic solution    TOBREX    1 Bottle    Apply 1 drop to eye every 4 hours for 7 days    Acute conjunctivitis of right eye, unspecified acute conjunctivitis type

## 2018-02-02 RX ORDER — TOBRAMYCIN 3 MG/ML
1 SOLUTION/ DROPS OPHTHALMIC EVERY 4 HOURS
Qty: 1 BOTTLE | Refills: 0 | Status: SHIPPED | OUTPATIENT
Start: 2018-02-02 | End: 2018-02-09

## 2018-05-03 ENCOUNTER — TRANSFERRED RECORDS (OUTPATIENT)
Dept: HEALTH INFORMATION MANAGEMENT | Facility: CLINIC | Age: 48
End: 2018-05-03

## 2018-10-17 PROCEDURE — 99284 EMERGENCY DEPT VISIT MOD MDM: CPT | Mod: 25

## 2018-10-17 PROCEDURE — 93005 ELECTROCARDIOGRAM TRACING: CPT

## 2018-10-17 PROCEDURE — 96360 HYDRATION IV INFUSION INIT: CPT

## 2018-10-17 RX ORDER — GABAPENTIN 100 MG/1
100-200 CAPSULE ORAL EVERY EVENING
COMMUNITY

## 2018-10-18 ENCOUNTER — HOSPITAL ENCOUNTER (EMERGENCY)
Facility: CLINIC | Age: 48
Discharge: HOME | End: 2018-10-18
Attending: EMERGENCY MEDICINE | Admitting: EMERGENCY MEDICINE
Payer: COMMERCIAL

## 2018-10-18 VITALS
HEIGHT: 67 IN | BODY MASS INDEX: 28.25 KG/M2 | OXYGEN SATURATION: 98 % | RESPIRATION RATE: 16 BRPM | SYSTOLIC BLOOD PRESSURE: 155 MMHG | TEMPERATURE: 98.6 F | WEIGHT: 180 LBS | DIASTOLIC BLOOD PRESSURE: 96 MMHG

## 2018-10-18 DIAGNOSIS — R20.2 PARESTHESIAS: ICD-10-CM

## 2018-10-18 DIAGNOSIS — I10 HYPERTENSION, UNSPECIFIED TYPE: ICD-10-CM

## 2018-10-18 DIAGNOSIS — J06.9 UPPER RESPIRATORY TRACT INFECTION, UNSPECIFIED TYPE: ICD-10-CM

## 2018-10-18 LAB
ANION GAP SERPL CALCULATED.3IONS-SCNC: 8 MMOL/L (ref 3–14)
BASOPHILS # BLD AUTO: 0 10E9/L (ref 0–0.2)
BASOPHILS NFR BLD AUTO: 0.3 %
BUN SERPL-MCNC: 16 MG/DL (ref 7–30)
CALCIUM SERPL-MCNC: 8.9 MG/DL (ref 8.5–10.1)
CHLORIDE SERPL-SCNC: 105 MMOL/L (ref 94–109)
CO2 SERPL-SCNC: 28 MMOL/L (ref 20–32)
CREAT SERPL-MCNC: 0.81 MG/DL (ref 0.52–1.04)
DIFFERENTIAL METHOD BLD: NORMAL
EOSINOPHIL # BLD AUTO: 0.1 10E9/L (ref 0–0.7)
EOSINOPHIL NFR BLD AUTO: 1.7 %
ERYTHROCYTE [DISTWIDTH] IN BLOOD BY AUTOMATED COUNT: 12.5 % (ref 10–15)
GFR SERPL CREATININE-BSD FRML MDRD: 75 ML/MIN/1.7M2
GLUCOSE SERPL-MCNC: 96 MG/DL (ref 70–99)
HCT VFR BLD AUTO: 42.2 % (ref 35–47)
HGB BLD-MCNC: 14.4 G/DL (ref 11.7–15.7)
IMM GRANULOCYTES # BLD: 0 10E9/L (ref 0–0.4)
IMM GRANULOCYTES NFR BLD: 0.2 %
INTERPRETATION ECG - MUSE: NORMAL
LYMPHOCYTES # BLD AUTO: 2.5 10E9/L (ref 0.8–5.3)
LYMPHOCYTES NFR BLD AUTO: 38.3 %
MCH RBC QN AUTO: 29 PG (ref 26.5–33)
MCHC RBC AUTO-ENTMCNC: 34.1 G/DL (ref 31.5–36.5)
MCV RBC AUTO: 85 FL (ref 78–100)
MONOCYTES # BLD AUTO: 0.7 10E9/L (ref 0–1.3)
MONOCYTES NFR BLD AUTO: 10.2 %
NEUTROPHILS # BLD AUTO: 3.3 10E9/L (ref 1.6–8.3)
NEUTROPHILS NFR BLD AUTO: 49.3 %
PLATELET # BLD AUTO: 259 10E9/L (ref 150–450)
POTASSIUM SERPL-SCNC: 3.5 MMOL/L (ref 3.4–5.3)
RBC # BLD AUTO: 4.96 10E12/L (ref 3.8–5.2)
SODIUM SERPL-SCNC: 141 MMOL/L (ref 133–144)
TROPONIN I SERPL-MCNC: <0.015 UG/L (ref 0–0.04)
TSH SERPL DL<=0.005 MIU/L-ACNC: 0.68 MU/L (ref 0.4–4)
WBC # BLD AUTO: 6.6 10E9/L (ref 4–11)

## 2018-10-18 PROCEDURE — 85025 COMPLETE CBC W/AUTO DIFF WBC: CPT | Performed by: EMERGENCY MEDICINE

## 2018-10-18 PROCEDURE — 25000128 H RX IP 250 OP 636: Performed by: EMERGENCY MEDICINE

## 2018-10-18 PROCEDURE — 84443 ASSAY THYROID STIM HORMONE: CPT | Performed by: EMERGENCY MEDICINE

## 2018-10-18 PROCEDURE — 84484 ASSAY OF TROPONIN QUANT: CPT | Performed by: EMERGENCY MEDICINE

## 2018-10-18 PROCEDURE — 80048 BASIC METABOLIC PNL TOTAL CA: CPT | Performed by: EMERGENCY MEDICINE

## 2018-10-18 RX ADMIN — SODIUM CHLORIDE 1000 ML: 9 INJECTION, SOLUTION INTRAVENOUS at 00:30

## 2018-10-18 ASSESSMENT — ENCOUNTER SYMPTOMS
NUMBNESS: 1
NAUSEA: 1
LIGHT-HEADEDNESS: 1
RHINORRHEA: 1
COUGH: 1
HEADACHES: 1

## 2018-10-18 NOTE — ED AVS SNAPSHOT
Emergency Department    [address] [facility] [address]    Phone:  [phone]    Fax:  [phone]                                       Janice Morgan   MRN: [id number]    Department:   Emergency Department   Date of Visit:  10/17/2018           After Visit Summary Signature Page     I have received my discharge instructions, and my questions have been answered. I have discussed any challenges I see with this plan with the nurse or doctor.    ..........................................................................................................................................  Patient/Patient Representative Signature      ..........................................................................................................................................  Patient Representative Print Name and Relationship to Patient    ..................................................               ................................................  Date                                   Time    ..........................................................................................................................................  Reviewed by Signature/Title    ...................................................              ..............................................  Date                                               Time          22EPIC Rev 08/18

## 2018-10-18 NOTE — ED PROVIDER NOTES
History     Chief Complaint:  Numbness    HPI   Janice Morgan is a 48 year old female who presents with tingling in her hands. The patient states that she has been sick with a cold this week and this evening she started experiencing tingling in both hands, ringing in her ears, nausea, a throbbing headache, and slight lightheadedness. She notes that before these symptoms started she took medications containing phenylephrine, aspirin, acetaminophen, and caffeine. She took these medications for her cold symptoms. The patient has also been taking Mucinex and Nyquil in the last week. She denies any chest pain or numbness or tingling in her legs. The patient took her blood pressure at home and found it to be in the 170s/100s    PE/DVT RISK FACTORS:  Sex:    Female  Hormones:   Negative  Tobacco:   Negative  Cancer:   Negative  Travel:   Negative  Surgery:   negative  Other immobilization: Negative  Personal history:  Negative  Family history:  Positive. Brother and father. Brother's was due to prolonged immobilization.      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    Respiratory: Positive for cough.    Cardiovascular: Negative for chest pain.   Gastrointestinal: Positive for nausea.   Neurological: Positive for light-headedness, numbness and headaches.   All other systems reviewed and are negative.      Physical Exam   First Vitals:  BP: (!)  "172/104  Heart Rate: 89  Temp: 98.6  F (37  C)  Resp: 16  Height: 170.2 cm (5' 7\")  Weight: 81.6 kg (180 lb)  SpO2: 99 %      Physical Exam  General: Patient is alert and normal appearing.  HEENT: Head atraumatic    Eyes: pupils equal and reactive. Conjunctiva clear   Nares: patent   Oropharynx: no lesions, uvula midline, no palatal draping, normal voice, no trismus  Neck: Supple without lymphadenopathy, no meningismus  Chest: Heart regular rate and rhythm.   Lungs: Equal clear to auscultation with no wheeze or rales  Abdomen: Soft, non tender, nondistended, normal bowel sounds  Back: No costovertebral angle tenderness, no midline C, T or L spine tenderness  Neuro: Grossly nonfocal, normal speech, strength equal bilaterally, CN 2-12 intact no pronator drift, normal finger to nose, normal gait  Extremities: No deformities, equal radial and DP pulses. No clubbing, cyanosis.  No edema  Skin: Warm and dry with no rash.       Emergency Department Course   ECG:  Time: 0031  Vent. Rate 68 bpm. CO interval 150. QRS duration 88. QT/QTc 390/414. P-R-T axis 57 67 53. Normal sinus rhythm with sinus arrhythmia. Normal ECG. No change compared to EKG dated 9/19/08 Read time: 0038      Laboratory:  CBC: WBC: 6.6, HGB: 14.4, PLT: 259  BMP: WNL (Creatinine: 0.81)(Glucose 96)  Troponin: <0.015  TSH: 0.68    Emergency Department Course:  Nursing notes and vitals reviewed.  1215: I performed an exam of the patient as documented above.     0153: I rechecked the patient. Findings and plan explained to the Patient. Patient discharged home with instructions regarding supportive care, medications, and reasons to return. The importance of close follow-up was reviewed.     Impression & Plan      Medical Decision Making:  Patient is a 48-year-old female who presents the emergency department with tingling in bilateral hands and a generalized feeling of lightheadedness.  Patient's been treating an upper respiratory infection with over-the-counter " medications and took phenylephrine for the first time today.  Her symptoms began following this.  Patient has a history of anxiety and feels quite anxious regarding her elevated blood pressure that she noted tonight as well.  She denies chest pain but does experience some easily windedness.  Here in the emergency department EKG showed no acute ischemic changes and troponin was negative and she has had persistent symptoms since 5 PM therefore I think ACS is unlikely.  Her thyroid function was within appropriate limits.  CBC and BMP was within normal limits.  Patient felt improved while here in the emergency department following fluid hydration.  Her neurologic exam was normal and given bilateral nature of symptoms I do not feel this is consistent with acute CVA.  Do not feel this is consistent with PE.  Patient's blood pressure is noted to be elevated here, however she states she has never had high blood pressure before and she does feel quite anxious regarding it.  I have asked her to confirm her blood pressures with checking several times daily over the next few days.  If she notices consistently elevated blood pressures especially as she weans off over-the-counter cold medication she has been taking she should discuss with her doctor starting medication for that.  Do not feel that her symptoms today are related to hypertensive emergency.  She shows no signs of hypertensive encephalopathy.  I feel symptomatic therapy as an outpatient and follow-up instructions are appropriate.  I recommended she follow-up with neurology if she continues to have paresthesias.  Patient and her significant other expressed agreement and understanding the plan.  All questions and concerns addressed.    Diagnosis:    ICD-10-CM    1. Paresthesias R20.2    2. Upper respiratory tract infection, unspecified type J06.9    3. Hypertension, unspecified type I10        Disposition:  discharged to home    Discharge Medications:  New Prescriptions     No medications on file       I, Tiago Caraballo, am serving as a scribe on 10/18/2018 at 12:15 AM to personally document services performed by Winnie Vaughn MD based on my observations and the provider's statements to me.     Tiago Caraballo  10/17/2018    EMERGENCY DEPARTMENT       Winnie Vaughn MD  10/18/18 0158

## 2018-10-18 NOTE — ED AVS SNAPSHOT
Emergency Department    [address] [facility] [address]    Phone:  [phone]    Fax:  [phone]                                       Janice Morgan   MRN: [id number]    Department:   Emergency Department   Date of Visit:  10/17/2018           Patient Information     Date Of Birth          1970        Your diagnoses for this visit were:     Paresthesias     Upper respiratory tract infection, unspecified type     Hypertension, unspecified type        You were seen by Winnie Vaughn MD.      Follow-up Information     Follow up with Yaneth Scott MD.    Specialty:  Family Practice    Contact information:    [address] [facility]   [facility] [address]  [phone]          Follow up with  Emergency Department.    Specialty:  EMERGENCY MEDICINE    Why:  If symptoms worsen    Contact information:    [address] [facility] [id number] [phone]        Discharge Instructions         Step-by-Step  Checking Your Blood Pressure    Date Last Reviewed: 4/27/2016 2000-2017 [email or web address]. [address] [place] [address]. All rights reserved. This information is not intended as a substitute for professional medical care. Always follow your healthcare professional's instructions.          High Blood Pressure, To Be Confirmed, No Treatment  Your blood pressure today was higher than normal. Sometimes anxiety or pain can cause a temporary rise in blood pressure. It later returns to normal. Blood pressure that is high only one time doesn t mean that you have high blood pressure (hypertension). High blood pressure is a chronic illness. But you should have your blood pressure measured again within the next few days to find out if it s still high.    Blood pressure measurements are given as 2 numbers. Systolic blood pressure is the upper number. This is the pressure when the heart contracts. Diastolic blood pressure is the lower number. This is  the pressure when the heart relaxes between beats. You will see your blood pressure readings written together. For example, a person with a systolic pressure of 118 and a diastolic pressure of 78 will have 118/78 written in the medical record.  Blood pressure is categorized as normal, elevated, or stage 1 or stage 2 high blood pressure:    Normal blood pressure is systolic of less than 120 and diastolic of less than 80 (120/80)    Elevated blood pressure is systolic of 120 to 129 and diastolic less than 80    Stage 1 high blood pressure is systolic is 130 to 139 or diastolic between 80 to 89    Stage 2 high blood pressure is when systolic is 140 or higher or the diastolic is 90 or higher  Lifestyle changes such as weight loss, exercise, and quitting smoking, can help manage your blood pressure. Have your blood pressure checked regularly to be sure it is under control.  Home care  To track your blood pressure, your provider may ask you to come into the office at different times and on different days. If your healthcare provider asks you to check your readings at home, ask him or her what times of the day to test and for how many days. Before you leave the office, ask your provider to show you how to take your blood pressure and be sure to ask questions if you don't understand something.  Consider buying an automatic blood pressure monitor. Ask your provider for a recommendation as well as the proper size cuff to fit your arm. You can buy blood pressure monitors at most pharmacies.  The American Heart Association recommends the following guidelines for home blood pressure monitoring:    Don't smoke or drink coffee or other caffeinated drinks for 30 minutes before taking your blood pressure.    Go to the bathroom before the test.    Relax for 5 minutes before taking the measurement.    Sit with your back supported (don't sit on a couch or soft chair); keep your feet on the floor uncrossed. Place your arm on a solid  flat surface (like a table) with the upper part of the arm at heart level. Place the middle of the cuff directly above the bend of the elbow. Check the monitor's instruction manual for an illustration.    Take multiple readings. When you measure, take 2 to 3 readings one minute apart and record all of the results.    Take your blood pressure at the same time every day, or as your healthcare provider recommends.    Record the date, time, and blood pressure reading.    Take the record with you to your next medical appointment. If your blood pressure monitor has a built-in memory, simply take the monitor with you to your next appointment.    Call your provider if you have several high readings. Don't be frightened by a single high blood pressure reading, but if you get several high readings, check in with your healthcare provider.    Note: When blood pressure reaches a systolic (top number) of 180 or higher OR diastolic (bottom number) of 110 or higher, seek emergency medical treatment.  Follow-up care  Keep all of your follow up appointments. If your blood pressure is more than 120 over 80 on 2 out of 3 days, you will need to follow up with your healthcare provider for more evaluation and treatment.  Don t put this off! High blood pressure can be treated. High blood pressure that s not treated raises your risk for heart attack, heart failure, and stroke.  When to seek medical advice  Call your healthcare provider right away if any of these occur:    Blood pressure reaches a systolic (top number) of 180 or higher, OR diastolic (bottom number) of 110 or higher    Chest pain or shortness of breath    Severe headache    Throbbing or rushing sound in the ears    Nosebleed    Sudden severe pain in your belly (abdomen)    Extreme drowsiness, confusion, or fainting    Dizziness or dizziness with spinning sensation (vertigo)    Weakness of an arm or leg or one side of the face    You have problems speaking or seeing   Date  "Last Reviewed: 12/1/2016 2000-2017 [employer]. [address] [place] [address], [place] [address]. All rights reserved. This information is not intended as a substitute for professional medical care. Always follow your healthcare professional's instructions.          Paraesthesias  Paraesthesia is a burning or prickling sensation that is sometimes felt in the hands, arms, legs or feet. It can also occur in other parts of the body. It can also feel like tingling or numbness, skin crawling, or itching. The feeling is not comfortable, but it is not painful. (The \"pins and needles\" feeling that happens when a foot or hand \"falls asleep\" is a temporary paraesthesia.)  Paraesthesias that last or come and go may be caused by medical issues that need to be treated. These include stroke, a bulging disk pressing on a nerve, a trapped nerve, vitamin deficiencies, or even certain medicines.  Tests are often done. These tests may include blood tests, X-ray, CT (computerized tomography) scan, or a muscle test (electromyography). Depending on the cause, treatment may include physical therapy.  Home care    Tell the healthcare provider about all medicines you take. This includes prescription and over-the-counter medicines, vitamins, and herbs. Ask if any of the medicines may be causing your problems. Do not make any changes to prescription medicines without talking to your healthcare provider first.    You may be prescribed medicines to help relieve the tingling feeling or for pain. Take all medicines as directed.    A numb hand or foot may be more prone to injury. To help protect it:  ? Always use oven mitts.  ? Test water with an unaffected hand or foot.  ? Use caution when trimming nails. File sharp areas.  ? Wear shoes that fit well to avoid pressure points, blisters, and ulcers.  ? Inspect your hands and feet carefully (including the soles of your feet and between your toes) at least once a week. If you see red areas, " sores, or other problems, tell your healthcare provider.  Follow-up care  Follow up with your doctor or as advised by our staff. You may need further testing or evaluation.  When to seek medical advice  Call your healthcare provider right away if any of the following occur:    Numbness or weakness of the face, one arm, or one leg    Slurred speech, confusion, trouble speaking, walking, or seeing    Severe headache, fainting spell, dizziness, or seizure    Chest, arm, neck, or upper back pain    Loss of bladder or bowel control    Open wound with redness, swelling, or pus  Date Last Reviewed: 9/25/2015 2000-2017 [employer]. [address] [place] [address]. All rights reserved. This information is not intended as a substitute for professional medical care. Always follow your healthcare professional's instructions.          24 Hour Appointment Hotline       To make an appointment at any [facility], call [phone] ([phone]). If you don't have a family doctor or clinic, we will help you find one. [facility] clinics are conveniently located to serve the needs of you and your family.             Review of your medicines      Our records show that you are taking the medicines listed below. If these are incorrect, please call your family doctor or clinic.        Dose / Directions Last dose taken    GABAPENTIN PO        Refills:  0        HERBALS        Refills:  0        SYNTHROID 25 MCG tablet   Dose:  250 mcg   Indication:  half of dose on Sundays   Quantity:  30 tablet   Generic drug:  levothyroxine        Take 250 mcg by mouth daily Take  Half on Friday Nothing on Saturdays   Refills:  6                Procedures and tests performed during your visit     Basic metabolic panel    CBC with platelets differential    Cardiac Continuous Monitoring    EKG 12-lead, tracing only    TSH with free T4 reflex    Troponin I      Orders Needing Specimen Collection     None      Pending  Results     No orders found from 10/16/2018 to 10/19/2018.            Pending Culture Results     No orders found from 10/16/2018 to 10/19/2018.            Pending Results Instructions     If you had any lab results that were not finalized at the time of your Discharge, you can call the ED Lab Result RN at [phone]. You will be contacted by this team for any positive Lab results or changes in treatment. The nurses are available 7 days a week from 10A to 6:30P.  You can leave a message 24 hours per day and they will return your call.        Test Results From Your Hospital Stay        10/18/2018  1:10 AM      Component Results     Component Value Ref Range & Units Status    WBC 6.6 4.0 - 11.0 10e9/L Final    RBC Count 4.96 3.8 - 5.2 10e12/L Final    Hemoglobin 14.4 11.7 - 15.7 g/dL Final    Hematocrit 42.2 35.0 - 47.0 % Final    MCV 85 78 - 100 fl Final    MCH 29.0 26.5 - 33.0 pg Final    MCHC 34.1 31.5 - 36.5 g/dL Final    RDW 12.5 10.0 - 15.0 % Final    Platelet Count 259 150 - 450 10e9/L Final    Diff Method Automated Method  Final    % Neutrophils 49.3 % Final    % Lymphocytes 38.3 % Final    % Monocytes 10.2 % Final    % Eosinophils 1.7 % Final    % Basophils 0.3 % Final    % Immature Granulocytes 0.2 % Final    Absolute Neutrophil 3.3 1.6 - 8.3 10e9/L Final    Absolute Lymphocytes 2.5 0.8 - 5.3 10e9/L Final    Absolute Monocytes 0.7 0.0 - 1.3 10e9/L Final    Absolute Eosinophils 0.1 0.0 - 0.7 10e9/L Final    Absolute Basophils 0.0 0.0 - 0.2 10e9/L Final    Abs Immature Granulocytes 0.0 0 - 0.4 10e9/L Final         10/18/2018  1:24 AM      Component Results     Component Value Ref Range & Units Status    Sodium 141 133 - 144 mmol/L Final    Potassium 3.5 3.4 - 5.3 mmol/L Final    Chloride 105 94 - 109 mmol/L Final    Carbon Dioxide 28 20 - 32 mmol/L Final    Anion Gap 8 3 - 14 mmol/L Final    Glucose 96 70 - 99 mg/dL Final    Urea Nitrogen 16 7 - 30 mg/dL Final    Creatinine 0.81 0.52 - 1.04 mg/dL Final     GFR Estimate 75 >60 mL/min/1.7m2 Final    Non  GFR Calc    GFR Estimate If Black >90 >60 mL/min/1.7m2 Final    African American GFR Calc    Calcium 8.9 8.5 - 10.1 mg/dL Final         10/18/2018  1:28 AM      Component Results     Component Value Ref Range & Units Status    Troponin I ES <0.015 0.000 - 0.045 ug/L Final    The 99th percentile for upper reference range is 0.045 ug/L.  Troponin values   in the range of 0.045 - 0.120 ug/L may be associated with risks of adverse   clinical events.           10/18/2018  1:34 AM      Component Results     Component Value Ref Range & Units Status    TSH 0.68 0.40 - 4.00 mU/L Final                Clinical Quality Measure: Blood Pressure Screening     Your blood pressure was checked while you were in the emergency department today. The last reading we obtained was  BP: (!) 155/96 . Please read the guidelines below about what these numbers mean and what you should do about them.  If your systolic blood pressure (the top number) is less than 120 and your diastolic blood pressure (the bottom number) is less than 80, then your blood pressure is normal. There is nothing more that you need to do about it.  If your systolic blood pressure (the top number) is 120-139 or your diastolic blood pressure (the bottom number) is 80-89, your blood pressure may be higher than it should be. You should have your blood pressure rechecked within a year by a primary care provider.  If your systolic blood pressure (the top number) is 140 or greater or your diastolic blood pressure (the bottom number) is 90 or greater, you may have high blood pressure. High blood pressure is treatable, but if left untreated over time it can put you at risk for heart attack, stroke, or kidney failure. You should have your blood pressure rechecked by a primary care provider within the next 4 weeks.  If your provider in the emergency department today gave you specific instructions to follow-up with your  doctor or provider even sooner than that, you should follow that instruction and not wait for up to 4 weeks for your follow-up visit.        Thank you for choosing [facility]       Thank you for choosing [facility] for your care. Our goal is always to provide you with excellent care. Hearing back from our patients is one way we can continue to improve our services. Please take a few minutes to complete the written survey that you may receive in the mail after you visit with us. Thank you!        [employer] Information     [employer] gives you secure access to your electronic health record. If you see a primary care provider, you can also send messages to your care team and make appointments. If you have questions, please call your primary care clinic.  If you do not have a primary care provider, please call [phone] and they will assist you.        Care EveryWhere ID     This is your Care EveryWhere ID. This could be used by other organizations to access your [facility] medical records  [id number]        Equal Access to Services     LUIZA ASHFORD : Alberto Baker, quinton lang, renea irizarry, leah telles . [facility] [phone].    ATENCIÓN: Si habla español, tiene a patel disposición servicios gratuitos de asistencia lingüística. Llame al [phone].    We comply with applicable federal civil rights laws and [place] laws. We do not discriminate on the basis of race, color, national origin, age, disability, sex, sexual orientation, or gender identity.            After Visit Summary       This is your record. Keep this with you and show to your community pharmacist(s) and doctor(s) at your next visit.

## 2018-10-18 NOTE — DISCHARGE INSTRUCTIONS
Step-by-Step  Checking Your Blood Pressure    Date Last Reviewed: 4/27/2016 2000-2017 [employer]. [address] [place] [address], [place] [address]. All rights reserved. This information is not intended as a substitute for professional medical care. Always follow your healthcare professional's instructions.          High Blood Pressure, To Be Confirmed, No Treatment  Your blood pressure today was higher than normal. Sometimes anxiety or pain can cause a temporary rise in blood pressure. It later returns to normal. Blood pressure that is high only one time doesn t mean that you have high blood pressure (hypertension). High blood pressure is a chronic illness. But you should have your blood pressure measured again within the next few days to find out if it s still high.    Blood pressure measurements are given as 2 numbers. Systolic blood pressure is the upper number. This is the pressure when the heart contracts. Diastolic blood pressure is the lower number. This is the pressure when the heart relaxes between beats. You will see your blood pressure readings written together. For example, a person with a systolic pressure of 118 and a diastolic pressure of 78 will have 118/78 written in the medical record.  Blood pressure is categorized as normal, elevated, or stage 1 or stage 2 high blood pressure:    Normal blood pressure is systolic of less than 120 and diastolic of less than 80 (120/80)    Elevated blood pressure is systolic of 120 to 129 and diastolic less than 80    Stage 1 high blood pressure is systolic is 130 to 139 or diastolic between 80 to 89    Stage 2 high blood pressure is when systolic is 140 or higher or the diastolic is 90 or higher  Lifestyle changes such as weight loss, exercise, and quitting smoking, can help manage your blood pressure. Have your blood pressure checked regularly to be sure it is under control.  Home care  To track your blood pressure, your provider may ask you to come  into the office at different times and on different days. If your healthcare provider asks you to check your readings at home, ask him or her what times of the day to test and for how many days. Before you leave the office, ask your provider to show you how to take your blood pressure and be sure to ask questions if you don't understand something.  Consider buying an automatic blood pressure monitor. Ask your provider for a recommendation as well as the proper size cuff to fit your arm. You can buy blood pressure monitors at most pharmacies.  The American Heart Association recommends the following guidelines for home blood pressure monitoring:    Don't smoke or drink coffee or other caffeinated drinks for 30 minutes before taking your blood pressure.    Go to the bathroom before the test.    Relax for 5 minutes before taking the measurement.    Sit with your back supported (don't sit on a couch or soft chair); keep your feet on the floor uncrossed. Place your arm on a solid flat surface (like a table) with the upper part of the arm at heart level. Place the middle of the cuff directly above the bend of the elbow. Check the monitor's instruction manual for an illustration.    Take multiple readings. When you measure, take 2 to 3 readings one minute apart and record all of the results.    Take your blood pressure at the same time every day, or as your healthcare provider recommends.    Record the date, time, and blood pressure reading.    Take the record with you to your next medical appointment. If your blood pressure monitor has a built-in memory, simply take the monitor with you to your next appointment.    Call your provider if you have several high readings. Don't be frightened by a single high blood pressure reading, but if you get several high readings, check in with your healthcare provider.    Note: When blood pressure reaches a systolic (top number) of 180 or higher OR diastolic (bottom number) of 110 or  "higher, seek emergency medical treatment.  Follow-up care  Keep all of your follow up appointments. If your blood pressure is more than 120 over 80 on 2 out of 3 days, you will need to follow up with your healthcare provider for more evaluation and treatment.  Don t put this off! High blood pressure can be treated. High blood pressure that s not treated raises your risk for heart attack, heart failure, and stroke.  When to seek medical advice  Call your healthcare provider right away if any of these occur:    Blood pressure reaches a systolic (top number) of 180 or higher, OR diastolic (bottom number) of 110 or higher    Chest pain or shortness of breath    Severe headache    Throbbing or rushing sound in the ears    Nosebleed    Sudden severe pain in your belly (abdomen)    Extreme drowsiness, confusion, or fainting    Dizziness or dizziness with spinning sensation (vertigo)    Weakness of an arm or leg or one side of the face    You have problems speaking or seeing   Date Last Reviewed: 12/1/2016 2000-2017 [employer]. [address] [place] [address]. All rights reserved. This information is not intended as a substitute for professional medical care. Always follow your healthcare professional's instructions.          Paraesthesias  Paraesthesia is a burning or prickling sensation that is sometimes felt in the hands, arms, legs or feet. It can also occur in other parts of the body. It can also feel like tingling or numbness, skin crawling, or itching. The feeling is not comfortable, but it is not painful. (The \"pins and needles\" feeling that happens when a foot or hand \"falls asleep\" is a temporary paraesthesia.)  Paraesthesias that last or come and go may be caused by medical issues that need to be treated. These include stroke, a bulging disk pressing on a nerve, a trapped nerve, vitamin deficiencies, or even certain medicines.  Tests are often done. These tests may include blood tests, " X-ray, CT (computerized tomography) scan, or a muscle test (electromyography). Depending on the cause, treatment may include physical therapy.  Home care    Tell the healthcare provider about all medicines you take. This includes prescription and over-the-counter medicines, vitamins, and herbs. Ask if any of the medicines may be causing your problems. Do not make any changes to prescription medicines without talking to your healthcare provider first.    You may be prescribed medicines to help relieve the tingling feeling or for pain. Take all medicines as directed.    A numb hand or foot may be more prone to injury. To help protect it:  ? Always use oven mitts.  ? Test water with an unaffected hand or foot.  ? Use caution when trimming nails. File sharp areas.  ? Wear shoes that fit well to avoid pressure points, blisters, and ulcers.  ? Inspect your hands and feet carefully (including the soles of your feet and between your toes) at least once a week. If you see red areas, sores, or other problems, tell your healthcare provider.  Follow-up care  Follow up with your doctor or as advised by our staff. You may need further testing or evaluation.  When to seek medical advice  Call your healthcare provider right away if any of the following occur:    Numbness or weakness of the face, one arm, or one leg    Slurred speech, confusion, trouble speaking, walking, or seeing    Severe headache, fainting spell, dizziness, or seizure    Chest, arm, neck, or upper back pain    Loss of bladder or bowel control    Open wound with redness, swelling, or pus  Date Last Reviewed: 9/25/2015 2000-2017 [employer]. [address] [place] [address], [place] [address]. All rights reserved. This information is not intended as a substitute for professional medical care. Always follow your healthcare professional's instructions.

## 2018-11-04 ENCOUNTER — E-VISIT (OUTPATIENT)
Dept: FAMILY MEDICINE | Facility: CLINIC | Age: 48
End: 2018-11-04
Payer: COMMERCIAL

## 2018-11-04 DIAGNOSIS — J01.00 ACUTE NON-RECURRENT MAXILLARY SINUSITIS: Primary | ICD-10-CM

## 2018-11-04 PROCEDURE — 99444 ZZC PHYSICIAN ONLINE EVALUATION & MANAGEMENT SERVICE: CPT | Performed by: FAMILY MEDICINE

## 2018-11-06 RX ORDER — DOXYCYCLINE 100 MG/1
100 CAPSULE ORAL 2 TIMES DAILY
Qty: 20 CAPSULE | Refills: 0 | Status: SHIPPED | OUTPATIENT
Start: 2018-11-06 | End: 2018-11-16

## 2018-11-30 ENCOUNTER — RADIANT APPOINTMENT (OUTPATIENT)
Dept: MAMMOGRAPHY | Facility: CLINIC | Age: 48
End: 2018-11-30
Attending: FAMILY MEDICINE
Payer: COMMERCIAL

## 2018-11-30 ENCOUNTER — OFFICE VISIT (OUTPATIENT)
Dept: FAMILY MEDICINE | Facility: CLINIC | Age: 48
End: 2018-11-30
Payer: COMMERCIAL

## 2018-11-30 DIAGNOSIS — Z12.31 VISIT FOR SCREENING MAMMOGRAM: ICD-10-CM

## 2018-11-30 DIAGNOSIS — Z23 NEED FOR PROPHYLACTIC VACCINATION AND INOCULATION AGAINST INFLUENZA: ICD-10-CM

## 2018-11-30 DIAGNOSIS — R03.0 ELEVATED BP WITHOUT DIAGNOSIS OF HYPERTENSION: ICD-10-CM

## 2018-11-30 DIAGNOSIS — Z00.00 ENCOUNTER FOR ROUTINE ADULT HEALTH EXAMINATION WITHOUT ABNORMAL FINDINGS: Primary | ICD-10-CM

## 2018-11-30 DIAGNOSIS — Z12.39 SCREENING BREAST EXAMINATION: ICD-10-CM

## 2018-11-30 PROCEDURE — G0145 SCR C/V CYTO,THINLAYER,RESCR: HCPCS | Performed by: FAMILY MEDICINE

## 2018-11-30 PROCEDURE — 99396 PREV VISIT EST AGE 40-64: CPT | Mod: 25 | Performed by: FAMILY MEDICINE

## 2018-11-30 PROCEDURE — 90686 IIV4 VACC NO PRSV 0.5 ML IM: CPT | Performed by: FAMILY MEDICINE

## 2018-11-30 PROCEDURE — 90471 IMMUNIZATION ADMIN: CPT | Performed by: FAMILY MEDICINE

## 2018-11-30 PROCEDURE — 87624 HPV HI-RISK TYP POOLED RSLT: CPT | Performed by: FAMILY MEDICINE

## 2018-11-30 NOTE — MR AVS SNAPSHOT
After Visit Summary   11/30/2018    Janice Morgan    MRN: [id number]           Patient Information     Date Of Birth          1970        Visit Information        Provider Department      11/30/2018 4:20 PM Yaneth Scott MD [facility]        Today's Diagnoses     Encounter for routine adult health examination without abnormal findings    -  1    Need for prophylactic vaccination and inoculation against influenza        Elevated BP without diagnosis of hypertension          Care Instructions      Preventive Health Recommendations  Female Ages 50 - 64    Yearly exam: See your health care provider every year in order to  o Review health changes.   o Discuss preventive care.    o Review your medicines if your doctor has prescribed any.      Get a Pap test every three years (unless you have an abnormal result and your provider advises testing more often).    If you get Pap tests with HPV test, you only need to test every 5 years, unless you have an abnormal result.     You do not need a Pap test if your uterus was removed (hysterectomy) and you have not had cancer.    You should be tested each year for STDs (sexually transmitted diseases) if you're at risk.     Have a mammogram every 1 to 2 years.    Have a colonoscopy at age 50, or have a yearly FIT test (stool test). These exams screen for colon cancer.      Have a cholesterol test every 5 years, or more often if advised.    Have a diabetes test (fasting glucose) every three years. If you are at risk for diabetes, you should have this test more often.     If you are at risk for osteoporosis (brittle bone disease), think about having a bone density scan (DEXA).    Shots: Get a flu shot each year. Get a tetanus shot every 10 years.    Nutrition:     Eat at least 5 servings of fruits and vegetables each day.    Eat whole-grain bread, whole-wheat pasta and brown rice instead of white grains and rice.    Get adequate Calcium and Vitamin D.      Lifestyle    Exercise at least 150 minutes a week (30 minutes a day, 5 days a week). This will help you control your weight and prevent disease.    Limit alcohol to one drink per day.    No smoking.     Wear sunscreen to prevent skin cancer.     See your dentist every six months for an exam and cleaning.    See your eye doctor every 1 to 2 years.            Follow-ups after your visit        Follow-up notes from your care team     Return in about 2 weeks (around 12/14/2018) for BP Recheck.      Future tests that were ordered for you today     Open Future Orders        Priority Expected Expires Ordered    Glucose Routine  3/28/2019 11/30/2018    Lipid Profile (Chol, Trig, HDL, LDL calc) Routine  3/28/2019 11/30/2018            Who to contact     If you have questions or need follow up information about today's clinic visit or your schedule please contact [facility] directly at [phone].  Normal or non-critical lab and imaging results will be communicated to you by [employer], letter or phone within 4 business days after the clinic has received the results. If you do not hear from us within 7 days, please contact the clinic through [employer] or phone. If you have a critical or abnormal lab result, we will notify you by phone as soon as possible.  Submit refill requests through [employer] or call your pharmacy and they will forward the refill request to us. Please allow 3 business days for your refill to be completed.          Additional Information About Your Visit        [employer] Information     [employer] gives you secure access to your electronic health record. If you see a primary care provider, you can also send messages to your care team and make appointments. If you have questions, please call your primary care clinic.  If you do not have a primary care provider, please call [phone] and they will assist you.        Care EveryWhere ID     This is your Care EveryWhere ID. This could be used by other  "organizations to access your [facility] medical records  [id number]        Your Vitals Were     Pulse Height Pulse Oximetry Breastfeeding? BMI (Body Mass Index)       84 5' 7\" (1.702 m) 99% No 30.85 kg/m2        Blood Pressure from Last 3 Encounters:   12/01/18 135/80   10/18/18 (!) 155/96   10/27/17 120/80    Weight from Last 3 Encounters:   11/30/18 197 lb (89.4 kg)   10/17/18 180 lb (81.6 kg)   10/27/17 178 lb (80.7 kg)              We Performed the Following          ADMIN VACCINE, FIRST [50460]     HC FLU VAC PRESRV FREE QUAD SPLIT VIR 3+YRS IM  [[id number]]     Pap imaged thin layer screen with HPV - recommended age 30 - 65 years (select HPV order below)        Primary Care Provider Office Phone # Fax #    Yaneth Scott [phone] [address] [address]        Equal Access to Services     EILEEN ASHFORD : Hadii aad ku hadasho Soomaali, waaxda luqadaha, qaybta kaalmada adeegyada, waxay idiin haykimberlyn ron telles . So [facility] [phone].    ATENCIÓN: Si habla español, tiene a patel disposición servicios gratuitos de asistencia lingüística. [facility] [phone].    We comply with applicable federal civil rights laws and [place] laws. We do not discriminate on the basis of race, color, national origin, age, disability, sex, sexual orientation, or gender identity.            Thank you!     Thank you for choosing [facility]  for your care. Our goal is always to provide you with excellent care. Hearing back from our patients is one way we can continue to improve our services. Please take a few minutes to complete the written survey that you may receive in the mail after your visit with us. Thank you!             Your Updated Medication List - Protect others around you: Learn how to safely use, store and throw away your medicines at www.disposemymeds.org.          This list is accurate as of 11/30/18 11:59 PM.  Always use your most recent med list.          "          Brand Name Dispense Instructions for use Diagnosis    GABAPENTIN PO           HERBALS           SYNTHROID 25 MCG tablet   Generic drug:  levothyroxine     30 tablet    Take 250 mcg by mouth daily Take  Half on Friday Nothing on Saturdays    Hypothyroidism

## 2018-11-30 NOTE — PROGRESS NOTES
SUBJECTIVE:   CC: Janice Morgan is an 48 year old woman who presents for preventive health visit.     Physical   Annual:     Getting at least 3 servings of Calcium per day:  Yes    Bi-annual eye exam:  Yes    Dental care twice a year:  Yes    Sleep apnea or symptoms of sleep apnea:  None    Diet:  Regular (no restrictions)    Frequency of exercise:  1 day/week    Duration of exercise:  Less than 15 minutes    Taking medications regularly:  Yes    Additional concerns today:  Yes    PHQ-2 Total Score: 2    Discuss blood pressure. It is elevated today , was seen in mid October with tingling and nausea, dizziness after taking a cold medicine with phenylephrine and caffeine in it , BP at the time was elevated            Today's PHQ-2 Score:   PHQ-2 ( 1999 Pfizer) 11/30/2018   Q1: Little interest or pleasure in doing things 1   Q2: Feeling down, depressed or hopeless 1   PHQ-2 Score 2   Q1: Little interest or pleasure in doing things Several days   Q2: Feeling down, depressed or hopeless Several days   PHQ-2 Score 2       Abuse: Current or Past(Physical, Sexual or Emotional)- No  Do you feel safe in your environment? Yes    Social History   Substance Use Topics     Smoking status: Former Smoker     Smokeless tobacco: Never Used      Comment: quit for 4 years.     Alcohol use Yes      Comment: occasionally     Alcohol Use 11/30/2018   If you drink alcohol do you typically have greater than 3 drinks per day OR greater than 7 drinks per week? Not Applicable       Reviewed orders with patient.  Reviewed health maintenance and updated orders accordingly - Yes  Labs reviewed in EPIC  BP Readings from Last 3 Encounters:   12/01/18 135/80   10/18/18 (!) 155/96   10/27/17 120/80    Wt Readings from Last 3 Encounters:   11/30/18 197 lb (89.4 kg)   10/17/18 180 lb (81.6 kg)   10/27/17 178 lb (80.7 kg)                  Patient Active Problem List   Diagnosis     Hashimoto's thyroiditis     CARDIOVASCULAR SCREENING; LDL GOAL  LESS THAN 160     Moderate major depression (H)     Migraine     Fevers, unknown origin     Cervical high risk HPV (human papillomavirus) test positive     Menopausal syndrome (hot flashes)     Overweight     Major depressive disorder, recurrent episode, moderate (H)     Anxiety     Past Surgical History:   Procedure Laterality Date     C ANALGESIA,EPIDURAL,LABOR &   , 2005     HC EXCISION BREAST LESION, OPEN >=1  2001     LAPAROSCOPIC APPENDECTOMY  2013    Procedure: LAPAROSCOPIC APPENDECTOMY;  Laparoscopic Appendectomy;  Surgeon: Mercy Leal MD;  Location:  OR       Social History   Substance Use Topics     Smoking status: Former Smoker     Smokeless tobacco: Never Used      Comment: quit for 4 years.     Alcohol use Yes      Comment: occasionally     Family History   Problem Relation Age of Onset     Asthma Mother      Psychotic Disorder Mother      Musculoskeletal Disorder Mother      fibromyalgia     C.A.D. Maternal Grandfather      Heart Disease Maternal Grandfather      Cerebrovascular Disease Maternal Grandfather      Diabetes Paternal Grandfather      Cancer Maternal Grandmother      lung     Cancer Paternal Grandmother      stomach         Current Outpatient Prescriptions   Medication Sig Dispense Refill     GABAPENTIN PO        HERBALS        levothyroxine (SYNTHROID) 25 MCG tablet Take 250 mcg by mouth daily Take  Half on Friday  Nothing on  30 tablet 6     Allergies   Allergen Reactions     Erythromycin Hives     Tape [Adhesive Tape]      Colth tape is the one that gives her the reaction.Burn type reacton , raw.     Penicillin [Penicillins]      Family alergies to the antibiotic. So it was never given to her.     Recent Labs   Lab Test  10/18/18   0100  17   1151  16   0944   13   0929  13   1308   LDL   --    --   90   --    --    --    HDL   --    --   68   --    --    --    TRIG   --    --   145   --    --    --    ALT    --   28   --    --   129*  142*   CR  0.81  0.83   --    < >  0.85  0.86   GFRESTIMATED  75  73   --    < >  73  72   GFRESTBLACK  >90  89   --    < >  88  87   POTASSIUM  3.5  3.9   --    < >  3.8  4.1   TSH  0.68   --   0.09*   < >   --    --     < > = values in this interval not displayed.        Mammogram Screening: Patient over age 50, mutual decision to screen reflected in health maintenance.    Pertinent mammograms are reviewed under the imaging tab.  History of abnormal Pap smear: NO - age 30- 65 PAP every 3 years recommended  PAP / HPV Latest Ref Rng & Units 2016 10/8/2014 2011   PAP - NIL NIL NIL   HPV 16 DNA NEG Negative - -   HPV 18 DNA NEG Negative - -   OTHER HR HPV NEG Negative - -     Reviewed and updated as needed this visit by clinical staff         Reviewed and updated as needed this visit by Provider        Past Medical History:   Diagnosis Date     Cervical high risk HPV (human papillomavirus) test positive 10/2014    NIL pap, + HPV (not 16 or 18) '16 HPV neg     Hashimoto's thyroiditis 10/8/2010      Past Surgical History:   Procedure Laterality Date     C ANALGESIA,EPIDURAL,LABOR &   , 2005     HC EXCISION BREAST LESION, OPEN >=1  2001     LAPAROSCOPIC APPENDECTOMY  2013    Procedure: LAPAROSCOPIC APPENDECTOMY;  Laparoscopic Appendectomy;  Surgeon: Mercy Leal MD;  Location:  OR       Review of Systems  CONSTITUTIONAL: NEGATIVE for fever, chills, change in weight  INTEGUMENTARU/SKIN: NEGATIVE for worrisome rashes, moles or lesions  EYES: NEGATIVE for vision changes or irritation  ENT: NEGATIVE for ear, mouth and throat problems  RESP: NEGATIVE for significant cough or SOB  BREAST: NEGATIVE for masses, tenderness or discharge  CV: NEGATIVE for chest pain, palpitations or peripheral edema  GI: NEGATIVE for nausea, abdominal pain, heartburn, or change in bowel habits  : NEGATIVE for unusual urinary or vaginal symptoms. Periods are  regular.  MUSCULOSKELETAL: NEGATIVE for significant arthralgias or myalgia  NEURO: NEGATIVE for weakness, dizziness or paresthesias  PSYCHIATRIC: NEGATIVE for changes in mood or affect     OBJECTIVE:   There were no vitals taken for this visit.  Physical Exam  GENERAL: healthy, alert and no distress  EYES: Eyes grossly normal to inspection, PERRL and conjunctivae and sclerae normal  HENT: ear canals and TM's normal, nose and mouth without ulcers or lesions  NECK: no adenopathy, no asymmetry, masses, or scars and thyroid normal to palpation  RESP: lungs clear to auscultation - no rales, rhonchi or wheezes  BREAST: normal without masses, tenderness or nipple discharge and no palpable axillary masses or adenopathy  CV: regular rate and rhythm, normal S1 S2, no S3 or S4, no murmur, click or rub, no peripheral edema and peripheral pulses strong  ABDOMEN: soft, nontender, no hepatosplenomegaly, no masses and bowel sounds normal   (female): normal female external genitalia, normal urethral meatus, vaginal mucosa pink, moist, well rugated, and normal cervix/adnexa/uterus without masses or discharge  MS: no gross musculoskeletal defects noted, no edema  SKIN: no suspicious lesions or rashes  NEURO: Normal strength and tone, mentation intact and speech normal  PSYCH: mentation appears normal, affect normal/bright    Diagnostic Test Results:  Results for orders placed or performed during the hospital encounter of 10/18/18   CBC with platelets differential   Result Value Ref Range    WBC 6.6 4.0 - 11.0 10e9/L    RBC Count 4.96 3.8 - 5.2 10e12/L    Hemoglobin 14.4 11.7 - 15.7 g/dL    Hematocrit 42.2 35.0 - 47.0 %    MCV 85 78 - 100 fl    MCH 29.0 26.5 - 33.0 pg    MCHC 34.1 31.5 - 36.5 g/dL    RDW 12.5 10.0 - 15.0 %    Platelet Count 259 150 - 450 10e9/L    Diff Method Automated Method     % Neutrophils 49.3 %    % Lymphocytes 38.3 %    % Monocytes 10.2 %    % Eosinophils 1.7 %    % Basophils 0.3 %    % Immature Granulocytes  0.2 %    Absolute Neutrophil 3.3 1.6 - 8.3 10e9/L    Absolute Lymphocytes 2.5 0.8 - 5.3 10e9/L    Absolute Monocytes 0.7 0.0 - 1.3 10e9/L    Absolute Eosinophils 0.1 0.0 - 0.7 10e9/L    Absolute Basophils 0.0 0.0 - 0.2 10e9/L    Abs Immature Granulocytes 0.0 0 - 0.4 10e9/L   Basic metabolic panel   Result Value Ref Range    Sodium 141 133 - 144 mmol/L    Potassium 3.5 3.4 - 5.3 mmol/L    Chloride 105 94 - 109 mmol/L    Carbon Dioxide 28 20 - 32 mmol/L    Anion Gap 8 3 - 14 mmol/L    Glucose 96 70 - 99 mg/dL    Urea Nitrogen 16 7 - 30 mg/dL    Creatinine 0.81 0.52 - 1.04 mg/dL    GFR Estimate 75 >60 mL/min/1.7m2    GFR Estimate If Black >90 >60 mL/min/1.7m2    Calcium 8.9 8.5 - 10.1 mg/dL   Troponin I   Result Value Ref Range    Troponin I ES <0.015 0.000 - 0.045 ug/L   TSH with free T4 reflex   Result Value Ref Range    TSH 0.68 0.40 - 4.00 mU/L   EKG 12-lead, tracing only   Result Value Ref Range    Interpretation ECG Click View Image link to view waveform and result        ASSESSMENT/PLAN:   1. Encounter for routine adult health examination without abnormal findings  Discussed diet,calcium,exercise.Went over self breast exam.Thin prep was done.Eyes and teeth UTD.No immunizations needed today.See orders below for tests ordered and screening needed.    - Pap imaged thin layer screen with HPV - recommended age 30 - 65 years (select HPV order below)  - Lipid Profile (Chol, Trig, HDL, LDL calc); Future  - Glucose; Future    2. Need for prophylactic vaccination and inoculation against influenza  GOT HER FLU SHOT TODAY , SHE WILL RETURN IN COUPLE OF WEEKS for updating her other immunizations, second hep B and hep a shots and tetanus shot.  - HC FLU VAC PRESRV FREE QUAD SPLIT VIR 3+YRS IM  [[id number]]  -      ADMIN VACCINE, FIRST [[id number]]  3.ELEVATED BP without a diagnosis of HTN- we discussed the ER visit her bp was high because of the phenylephrine in the cold medicine , she will need to make a f/u visit to recheck BP and  "update her immunizations, Pt is aware  and comfortable with the current plan.    COUNSELING:  Reviewed preventive health counseling, as reflected in patient instructions       Regular exercise       Healthy diet/nutrition       Vision screening       Osteoporosis Prevention/Bone Health       (Yuli)menopause management    BP Readings from Last 1 Encounters:   10/18/18 (!) 155/96     Estimated body mass index is 28.19 kg/(m^2) as calculated from the following:    Height as of 10/17/18: 5' 7\" (1.702 m).    Weight as of 10/17/18: 180 lb (81.6 kg).           reports that she has quit smoking. She has never used smokeless tobacco.      Counseling Resources:  ATP IV Guidelines  Pooled Cohorts Equation Calculator  Breast Cancer Risk Calculator  FRAX Risk Assessment  ICSI Preventive Guidelines  Dietary Guidelines for Americans, 2010  USDA's MyPlate  ASA Prophylaxis  Lung CA Screening    Yaneth Scott MD  [facility]  "

## 2018-11-30 NOTE — NURSING NOTE
"Chief Complaint   Patient presents with     Physical     Back Pain       Initial BP (!) 142/92  Pulse 84  Ht 5' 7\" (1.702 m)  Wt 197 lb (89.4 kg)  SpO2 99%  Breastfeeding? No  BMI 30.85 kg/m2 Estimated body mass index is 30.85 kg/(m^2) as calculated from the following:    Height as of this encounter: 5' 7\" (1.702 m).    Weight as of this encounter: 197 lb (89.4 kg).  BP completed using cuff size: regular    Health Maintenance Due   Topic Date Due     HIV SCREEN (SYSTEM ASSIGNED)  07/28/1986     PHQ-9 Q6 MONTHS  07/16/2017     COLON CANCER SCREEN (SYSTEM ASSIGNED)  07/28/2018     TETANUS IMMUNIZATION (SYSTEM ASSIGNED)  08/05/2018     INFLUENZA VACCINE (1) 09/01/2018     PAP Q3 YR  01/12/2019         Jovita Leon MA 11/30/18        "

## 2018-12-01 VITALS
DIASTOLIC BLOOD PRESSURE: 80 MMHG | OXYGEN SATURATION: 99 % | SYSTOLIC BLOOD PRESSURE: 135 MMHG | HEART RATE: 84 BPM | BODY MASS INDEX: 30.92 KG/M2 | HEIGHT: 67 IN | WEIGHT: 197 LBS

## 2018-12-06 LAB
COPATH REPORT: NORMAL
PAP: NORMAL

## 2018-12-07 LAB
FINAL DIAGNOSIS: NORMAL
HPV HR 12 DNA CVX QL NAA+PROBE: NEGATIVE
HPV16 DNA SPEC QL NAA+PROBE: NEGATIVE
HPV18 DNA SPEC QL NAA+PROBE: NEGATIVE
SPECIMEN DESCRIPTION: NORMAL
SPECIMEN SOURCE CVX/VAG CYTO: NORMAL

## 2019-04-23 ENCOUNTER — OFFICE VISIT (OUTPATIENT)
Dept: FAMILY MEDICINE | Facility: CLINIC | Age: 49
End: 2019-04-23
Payer: COMMERCIAL

## 2019-04-23 VITALS — SYSTOLIC BLOOD PRESSURE: 134 MMHG | DIASTOLIC BLOOD PRESSURE: 91 MMHG | TEMPERATURE: 98.7 F

## 2019-04-23 DIAGNOSIS — Z71.84 TRAVEL ADVICE ENCOUNTER: Primary | ICD-10-CM

## 2019-04-23 DIAGNOSIS — Z23 NEED FOR VACCINATION: ICD-10-CM

## 2019-04-23 PROCEDURE — 90472 IMMUNIZATION ADMIN EACH ADD: CPT | Mod: GA | Performed by: FAMILY MEDICINE

## 2019-04-23 PROCEDURE — 90707 MMR VACCINE SC: CPT | Mod: GA | Performed by: FAMILY MEDICINE

## 2019-04-23 PROCEDURE — 90632 HEPA VACCINE ADULT IM: CPT | Mod: GA | Performed by: FAMILY MEDICINE

## 2019-04-23 PROCEDURE — 90471 IMMUNIZATION ADMIN: CPT | Mod: GA | Performed by: FAMILY MEDICINE

## 2019-04-23 PROCEDURE — 90715 TDAP VACCINE 7 YRS/> IM: CPT | Mod: GA | Performed by: FAMILY MEDICINE

## 2019-04-23 PROCEDURE — 99402 PREV MED CNSL INDIV APPRX 30: CPT | Mod: 25 | Performed by: FAMILY MEDICINE

## 2019-04-23 PROCEDURE — 90738 INACTIVATED JE VACC IM: CPT | Mod: GA | Performed by: FAMILY MEDICINE

## 2019-04-23 PROCEDURE — 90746 HEPB VACCINE 3 DOSE ADULT IM: CPT | Mod: GA | Performed by: FAMILY MEDICINE

## 2019-04-23 RX ORDER — CIPROFLOXACIN 500 MG/1
TABLET, FILM COATED ORAL
Qty: 6 TABLET | Refills: 0 | Status: SHIPPED | OUTPATIENT
Start: 2019-04-23 | End: 2019-07-19

## 2019-04-23 RX ORDER — ATOVAQUONE AND PROGUANIL HYDROCHLORIDE 250; 100 MG/1; MG/1
1 TABLET, FILM COATED ORAL DAILY
Qty: 14 TABLET | Refills: 0 | Status: SHIPPED | OUTPATIENT
Start: 2019-04-23 | End: 2019-07-19

## 2019-04-23 NOTE — NURSING NOTE
"Chief Complaint   Patient presents with     [facility]     initial BP (!) 134/91 (BP Location: Right arm, Cuff Size: Adult Regular)   Temp 98.7  F (37.1  C) (Oral)   LMP 08/12/2016  Estimated body mass index is 30.85 kg/m  as calculated from the following:    Height as of 11/30/18: 1.702 m (5' 7\").    Weight as of 11/30/18: 89.4 kg (197 lb).  BP completed using cuff size: regular.   R arm      Health Maintenance that is potentially due pending provider review:  NONE    n/a    Yaya Buitrago ma  "

## 2019-04-23 NOTE — PROGRESS NOTES
Itinerary:  [place]    Departure Date: 5/20/19    Return Date: 5/26/19    Length of Trip: 6 days    Purpose of Trip: business    Urban/Rural: both, [place] farm    Accommodations: hotel    IMMUNIZATION HISTORY  Have you received any immunizations within the past 4 weeks?  No  Have you ever fainted from having your blood drawn or from an injection?  Yes  Have you ever had a fever reaction to vaccination?  Yes  Have you ever had any bad reaction or side effect from any vaccination?  No  Have you ever had hepatitis A or B vaccine?  Yes  Do you live (or work closely) with anyone who has AIDS, an AIDS-like condition, any other immune disorder or who is on chemotherapy for cancer or a   family history of immunodeficiency?  No  Have you received any injection of immune globulin or any blood products during the past 12 months?  No    Patient roomed by Yaya Buitrago ma      Special medical concerns: concern about h3n1 exposure from chickens, rec regular flu vaccine + N95 mask if in a high risk area    BP (!) 134/91 (BP Location: Right arm, Cuff Size: Adult Regular)   Temp 98.7  F (37.1  C) (Oral)   LMP 08/12/2016   EXAM: deferred    Immunizations discussed include: Hepatitis A, Hepatitis B, Typhoid, Japanese Encephalitis, Tetanus/Diphtheria and Measles/Mumps/Rubella  Malaraia prophylaxis recommended: Malarone  Symptomatic treatment for traveler's diarrhea: ciprofloxacin  ASSESSMENT/PLAN:    ICD-10-CM    1. Travel advice encounter Z71.89 atovaquone-proguanil (MALARONE) 250-100 MG tablet     ciprofloxacin (CIPRO) 500 MG tablet     ADMIN 1st VACCINE     EA ADD'L VACCINE     EA ADD'L VACCINE   2. Need for vaccination Z23 HEPA VACCINE ADULT IM     typhoid (VIVOTIF) CR capsule     MMR VIRUS IMMUNIZATION, SUBCUT     [place] ENCEPHALITIS IM 16 YO +     [place] ENCEPHALITIS IM 16 YO +     TDAP VACCINE (ADACEL)     HEPATITIS B VACCINE, ADULT, IM     ADMIN 1st VACCINE     EA ADD'L VACCINE     I have reviewed general  recommendations for safe travel   including: food/water precautions, insect avoidance, safe sex   practices given high prevalence of HIV and other STDs,   roadway safety. Educational materials and [employer] report provided.    Total visit time 30 minutes with over 50% of time spent counseling patient.

## 2019-04-23 NOTE — PATIENT INSTRUCTIONS
Today April 23, 2019 you received the    Hepatitis A Vaccine - Done    Hepatitis B Vaccine -   10/20/19 or later for your 3rd and final dose.    Japanese Encephalitis (JE) Accelerated Vaccine - Please return on 4/30/19 for your 2nd dose, and in one year or later for a booster immunization.    Tetanus (Tdap) Vaccine    MMR (Measles Mumps Rubella) Vaccine - Done    Typhoid - Oral. This prescription has been faxed to your pharmacy, please take as directed.  A booster in 5 years  .    These appointments can be made as a NURSE ONLY visit.    **It is very important for the vaccinations to be given on the scheduled day(s), this helps ensure you receive the full effectiveness of the vaccine.**    Please call [facility] with any questions [phone]    Thank you for visiting [facility]

## 2019-05-01 ENCOUNTER — ALLIED HEALTH/NURSE VISIT (OUTPATIENT)
Dept: NURSING | Facility: CLINIC | Age: 49
End: 2019-05-01
Payer: COMMERCIAL

## 2019-05-01 DIAGNOSIS — Z23 NEED FOR VACCINATION: ICD-10-CM

## 2019-05-01 PROCEDURE — 90471 IMMUNIZATION ADMIN: CPT | Mod: GA

## 2019-05-01 PROCEDURE — 90738 INACTIVATED JE VACC IM: CPT | Mod: GA

## 2019-05-01 NOTE — PROGRESS NOTES
Screening Questionnaire for Adult Immunization    Are you sick today?   No   Do you have allergies to medications, food, a vaccine component or latex?   No   Have you ever had a serious reaction after receiving a vaccination?   No   Do you have a long-term health problem with heart disease, lung disease, asthma, kidney disease, metabolic disease (e.g. diabetes), anemia, or other blood disorder?   No   Do you have cancer, leukemia, HIV/AIDS, or any other immune system problem?   No   In the past 3 months, have you taken medications that affect  your immune system, such as prednisone, other steroids, or anticancer drugs; drugs for the treatment of rheumatoid arthritis, Crohn s disease, or psoriasis; or have you had radiation treatments?   No   Have you had a seizure, or a brain or other nervous system problem?   No   During the past year, have you received a transfusion of blood or blood     products, or been given immune (gamma) globulin or antiviral drug?   No   For women: Are you pregnant or is there a chance you could become        pregnant during the next month?   No   Have you received any vaccinations in the past 4 weeks?   No     Immunization questionnaire answers were all negative.        Per orders of Dr. Hilario, injection of JE  given by Winnie Mckeon. Patient instructed to remain in clinic for 15 minutes afterwards, and to report any adverse reaction to me immediately.       Screening performed by Winnie Mckeon on 5/1/2019 at 9:59 AM.

## 2019-05-01 NOTE — NURSING NOTE
"Chief Complaint   Patient presents with     Allied Health Visit     Imm/Inj     JE      LMP 08/12/2016  Estimated body mass index is 30.85 kg/m  as calculated from the following:    Height as of 11/30/18: 1.702 m (5' 7\").    Weight as of 11/30/18: 89.4 kg (197 lb).  bp completed using cuff size: NA (Not Taken)       Health Maintenance addressed:  NONE    n/a    Winnie Mckeon MA     "

## 2019-06-12 ENCOUNTER — OFFICE VISIT (OUTPATIENT)
Dept: FAMILY MEDICINE | Facility: CLINIC | Age: 49
End: 2019-06-12
Payer: COMMERCIAL

## 2019-06-12 VITALS
HEIGHT: 67 IN | SYSTOLIC BLOOD PRESSURE: 130 MMHG | HEART RATE: 84 BPM | WEIGHT: 193 LBS | OXYGEN SATURATION: 98 % | RESPIRATION RATE: 16 BRPM | DIASTOLIC BLOOD PRESSURE: 80 MMHG | BODY MASS INDEX: 30.29 KG/M2 | TEMPERATURE: 98.4 F

## 2019-06-12 DIAGNOSIS — F33.1 MAJOR DEPRESSIVE DISORDER, RECURRENT EPISODE, MODERATE (H): ICD-10-CM

## 2019-06-12 DIAGNOSIS — R16.0 LIVER MASS, RIGHT LOBE: ICD-10-CM

## 2019-06-12 DIAGNOSIS — R10.32 ABDOMINAL PAIN, LEFT LOWER QUADRANT: ICD-10-CM

## 2019-06-12 DIAGNOSIS — K82.4 GALLBLADDER POLYP: ICD-10-CM

## 2019-06-12 DIAGNOSIS — K59.00 CONSTIPATION, UNSPECIFIED CONSTIPATION TYPE: Primary | ICD-10-CM

## 2019-06-12 PROCEDURE — 99214 OFFICE O/P EST MOD 30 MIN: CPT | Performed by: FAMILY MEDICINE

## 2019-06-12 RX ORDER — POLYETHYLENE GLYCOL 3350 17 G/17G
1 POWDER, FOR SOLUTION ORAL DAILY
Qty: 1 BOTTLE | Refills: 0 | Status: SHIPPED | OUTPATIENT
Start: 2019-06-12 | End: 2021-01-14

## 2019-06-12 ASSESSMENT — MIFFLIN-ST. JEOR: SCORE: 1528.07

## 2019-06-12 ASSESSMENT — PATIENT HEALTH QUESTIONNAIRE - PHQ9: SUM OF ALL RESPONSES TO PHQ QUESTIONS 1-9: 4

## 2019-06-12 NOTE — PATIENT INSTRUCTIONS
Please set up your colonoscopy with Dr.Paul Amador at [facility] [phone], [facility] [phone], or [facility] [phone]

## 2019-06-12 NOTE — NURSING NOTE
"Chief Complaint   Patient presents with     Abdominal Pain     for 3 days     initial BP (!) 130/91   Pulse 84   Temp 98.4  F (36.9  C) (Oral)   Resp 16   Ht 1.702 m (5' 7\")   Wt 87.5 kg (193 lb)   LMP 08/12/2016   SpO2 98%   BMI 30.23 kg/m   Estimated body mass index is 30.23 kg/m  as calculated from the following:    Height as of this encounter: 1.702 m (5' 7\").    Weight as of this encounter: 87.5 kg (193 lb).  BP completed using cuff size: regular.  L  arm      Health Maintenance that is potentially due pending provider review:  PHQ9    PHQ/ACT/ADELE--Gave pt questionnare    Yaya Buitrago ma  "

## 2019-06-12 NOTE — PROGRESS NOTES
Subjective     Janice Morgan is a 48 year old female who presents to clinic today for the following health issues:    HPI   Abdominal Pain      Duration: last month but worse in the past week and maggie last three days . She traveled to [place] and [place] where she would not use the bathroom as needed so she got constiapted but has been trying to take more fiber since coming back home a couple of weeks ago , she did have a UTI which she treated for three days wth Cipro she had and that got better , she denies any dysuria now , no hematuria but BM are still hard and jackson like     Description (location/character/radiation): LLQ       Associated flank pain: None    Intensity:  moderate    Accompanying signs and symptoms:        Fever/Chills: no        Gas/Bloating: YES       Nausea/vomitting: no        Diarrhea: no        Dysuria or Hematuria: no     History (previous similar pain/trauma/previous testing): as above     Precipitating or alleviating factors:       Pain worse with eating/BM/urination: yes with BM some relief but not much        Pain relieved by BM: YES    Therapies tried and outcome: fiber in the diet and more liquids     LMP:  not applicable        Patient Active Problem List   Diagnosis     Hashimoto's thyroiditis     CARDIOVASCULAR SCREENING; LDL GOAL LESS THAN 160     Moderate major depression (H)     Migraine     Fevers, unknown origin     Cervical high risk HPV (human papillomavirus) test positive     Menopausal syndrome (hot flashes)     Overweight     Major depressive disorder, recurrent episode, moderate (H)     Anxiety     Past Surgical History:   Procedure Laterality Date     C ANALGESIA,EPIDURAL,LABOR &   , 2005     HC EXCISION BREAST LESION, OPEN >=1  2001     LAPAROSCOPIC APPENDECTOMY  2013    Procedure: LAPAROSCOPIC APPENDECTOMY;  Laparoscopic Appendectomy;  Surgeon: Mercy Leal MD;  Location:  OR       Social History      Tobacco Use     Smoking status: Former Smoker     Smokeless tobacco: Never Used     Tobacco comment: quit for 4 years.   Substance Use Topics     Alcohol use: Yes     Comment: occasionally     Family History   Problem Relation Age of Onset     Asthma Mother      Psychotic Disorder Mother      Musculoskeletal Disorder Mother         fibromyalgia     C.A.D. Maternal Grandfather      Heart Disease Maternal Grandfather      Cerebrovascular Disease Maternal Grandfather      Diabetes Paternal Grandfather      Cancer Maternal Grandmother         lung     Cancer Paternal Grandmother         stomach         Current Outpatient Medications   Medication Sig Dispense Refill     GABAPENTIN PO        levothyroxine (SYNTHROID) 25 MCG tablet Take 250 mcg by mouth daily Take  Half on Friday  Nothing on Saturdays 30 tablet 6     atovaquone-proguanil (MALARONE) 250-100 MG tablet Take 1 tablet by mouth daily Start 1 day before travelling to a Malaria risk area and continue until 7 days after return. (Patient not taking: Reported on 6/12/2019) 14 tablet 0     ciprofloxacin (CIPRO) 500 MG tablet Take one tablet twice a day for up to 3 days as needed for traveler's diarrhea (Patient not taking: Reported on 6/12/2019) 6 tablet 0     HERBALS        typhoid (VIVOTIF) CR capsule Take 1 capsule by mouth every other day (Patient not taking: Reported on 6/12/2019) 4 capsule 0     Allergies   Allergen Reactions     Erythromycin Hives     Tape [Adhesive Tape]      Colth tape is the one that gives her the reaction.Burn type reacton , raw.     Penicillin [Penicillins]      Family alergies to the antibiotic. So it was never given to her.     Recent Labs   Lab Test 10/18/18  0100 04/03/17  1151 01/12/16  0944  04/22/13  0929 04/20/13  1308   LDL  --   --  90  --   --   --    HDL  --   --  68  --   --   --    TRIG  --   --  145  --   --   --    ALT  --  28  --   --  129* 142*   CR 0.81 0.83  --    < > 0.85 0.86   GFRESTIMATED 75 73  --    < > 73 72  "  GFRESTBLACK >90 89  --    < > 88 87   POTASSIUM 3.5 3.9  --    < > 3.8 4.1   TSH 0.68  --  0.09*   < >  --   --     < > = values in this interval not displayed.      BP Readings from Last 3 Encounters:   06/12/19 (!) 130/91   04/23/19 (!) 134/91   12/01/18 135/80    Wt Readings from Last 3 Encounters:   06/12/19 87.5 kg (193 lb)   11/30/18 89.4 kg (197 lb)   10/17/18 81.6 kg (180 lb)                      Reviewed and updated as needed this visit by Provider         Review of Systems   ROS COMP: Constitutional, HEENT, cardiovascular, pulmonary, GI, , musculoskeletal, neuro, skin, endocrine and psych systems are negative, except as otherwise noted.      Objective    BP (!) 130/91   Pulse 84   Temp 98.4  F (36.9  C) (Oral)   Resp 16   Ht 1.702 m (5' 7\")   Wt 87.5 kg (193 lb)   LMP 08/12/2016   SpO2 98%   BMI 30.23 kg/m    Body mass index is 30.23 kg/m .  Physical Exam   GENERAL: healthy, alert and no distress  EYES: Eyes grossly normal to inspection, PERRL and conjunctivae and sclerae normal  NECK: no adenopathy, no asymmetry, masses, or scars and thyroid normal to palpation  RESP: lungs clear to auscultation - no rales, rhonchi or wheezes  CV: regular rate and rhythm, normal S1 S2, no S3 or S4, no murmur, click or rub, no peripheral edema and peripheral pulses strong  ABDOMEN: soft, tenderness in the LLQ but there is no rebound no guarding no referred pain , no hepatosplenomegaly, no masses and bowel sounds normal  MS: no gross musculoskeletal defects noted, no edema    Diagnostic Test Results:  Labs reviewed in Epic  Results for orders placed or performed in visit on 11/30/18   Pap imaged thin layer screen with HPV - recommended age 30 - 65 years (select HPV order below)   Result Value Ref Range    PAP NIL     Copath Report         Patient Name: ELZBIETA GARZA  MR#: [id number]  Specimen #: [id number]  Collected: 11/30/2018  Received: 12/4/2018  Reported: 12/6/2018 09:00  Ordering Phy(s): DENEEN " NADEEM    For improved result formatting, select 'View Enhanced Report Format' under   Linked Documents section.    SPECIMEN/STAIN PROCESS:  Pap imaged thin layer prep screening (Surepath, FocalPoint with guided   screening)       Pap-Cyto x 1, HPV ordered x 1    SOURCE: Cervical, endocervical  ----------------------------------------------------------------   Pap imaged thin layer prep screening (Surepath, FocalPoint with guided   screening)  SPECIMEN ADEQUACY:  Satisfactory for evaluation.  -Transformation zone component absent.    CYTOLOGIC INTERPRETATION:    Negative for intraepithelial lesion or malignancy    Electronically signed out by:  IVA Banda (ASCP)    Processed and screened at [facility]    CLINICAL HISTORY:    Currently not having periods  Irr egular periods, A previous normal pap  Date of Last Pap: 1/12/2016,    Papanicolaou Test Limitations:  Cervical cytology is a screening test with   limited sensitivity; regular  screening is critical for cancer prevention; Pap tests are primarily   effective for the diagnosis/prevention of  squamous cell carcinoma, not adenocarcinomas or other cancers.    TESTING LAB LOCATION:  [address]  [phone]    COLLECTION SITE:  Client:  [facility]  Location: [facility] (B)     HPV High Risk Types DNA Cervical   Result Value Ref Range    HPV Source SurePath     HPV 16 DNA Negative NEG^Negative    HPV 18 DNA Negative NEG^Negative    Other HR HPV Negative NEG^Negative    Final Diagnosis This patient's sample is negative for HPV DNA.     Specimen Description Cervical Cells            Assessment & Plan     1. Constipation, unspecified constipation type  We discussed treating the constipation and if no improvement she would need to do the abdominal CTscan to r/o diverticulitis ,   - polyethylene glycol  (MIRALAX/GLYCOLAX) powder; Take 17 g (1 capful) by mouth daily With a big glass of water  Dispense: 1 Bottle; Refill: 0    2. Abdominal pain, left lower quadrant  As above , also discussed drinking plenty of fluids , fiber in the diet etc   - CT Abdomen Pelvis w Contrast; Future    3. Major depressive disorder, recurrent episode, moderate (H)  Stable and not on antidepressants currently .          Yaneth Scott MD  [facility]

## 2019-06-12 NOTE — NURSING NOTE
"Chief Complaint   Patient presents with     Abdominal Pain     for 3 days     initial BP (!) 136/95 (BP Location: Left arm, Cuff Size: Adult Regular)   Pulse 84   Temp 98.4  F (36.9  C) (Oral)   Resp 16   Ht 1.702 m (5' 7\")   Wt 87.5 kg (193 lb)   LMP 08/12/2016   SpO2 98%   BMI 30.23 kg/m   Estimated body mass index is 30.23 kg/m  as calculated from the following:    Height as of this encounter: 1.702 m (5' 7\").    Weight as of this encounter: 87.5 kg (193 lb).  BP completed using cuff size: regular.  L  arm      Health Maintenance that is potentially due pending provider review:  NONE    n/a    Yaya Buitrago ma  "

## 2019-06-13 ENCOUNTER — ANCILLARY PROCEDURE (OUTPATIENT)
Dept: CT IMAGING | Facility: CLINIC | Age: 49
End: 2019-06-13
Attending: FAMILY MEDICINE
Payer: COMMERCIAL

## 2019-06-13 DIAGNOSIS — R10.32 ABDOMINAL PAIN, LEFT LOWER QUADRANT: ICD-10-CM

## 2019-06-13 LAB — RADIOLOGIST FLAGS: NORMAL

## 2019-06-13 RX ORDER — IOPAMIDOL 755 MG/ML
119 INJECTION, SOLUTION INTRAVASCULAR ONCE
Status: COMPLETED | OUTPATIENT
Start: 2019-06-13 | End: 2019-06-13

## 2019-06-13 RX ADMIN — IOPAMIDOL 119 ML: 755 INJECTION, SOLUTION INTRAVASCULAR at 17:00

## 2019-06-13 NOTE — DISCHARGE INSTRUCTIONS

## 2019-06-17 ENCOUNTER — TELEPHONE (OUTPATIENT)
Dept: FAMILY MEDICINE | Facility: CLINIC | Age: 49
End: 2019-06-17

## 2019-06-17 DIAGNOSIS — K82.4 POLYP OF GALLBLADDER: Primary | ICD-10-CM

## 2019-06-17 NOTE — TELEPHONE ENCOUNTER
Reason for Call:  Request for results:    Name of test or procedure: CT    Date of test of procedure: 6.13.19    Location of the test or procedure: [facility]    OK to leave the result message on voice mail or with a family member? YES    Phone number Patient can be reached at:  Cell number on file:    Telephone Information:   Mobile [phone]     Additional comments:     Call taken on 6/17/2019 at 12:51 PM by Staci Vallejo

## 2019-06-17 NOTE — TELEPHONE ENCOUNTER
LLQ pain, this CT is non-diagnostic.  Follow up in the clinic to discuss results if the pain continues

## 2019-06-18 NOTE — TELEPHONE ENCOUNTER
Talked with the pt , her symptoms are better now on the Miralax and she has soft BM finally , so she will start taking Metamucil daily and make sure she drinks lots of water .  We discussed the finding of the polyp in the gallbladder and she will do a limited abd US to look into the gallbladder

## 2019-06-19 ENCOUNTER — ANCILLARY PROCEDURE (OUTPATIENT)
Dept: ULTRASOUND IMAGING | Facility: CLINIC | Age: 49
End: 2019-06-19
Attending: FAMILY MEDICINE
Payer: COMMERCIAL

## 2019-06-19 DIAGNOSIS — K82.4 POLYP OF GALLBLADDER: ICD-10-CM

## 2019-06-21 ENCOUNTER — TELEPHONE (OUTPATIENT)
Dept: FAMILY MEDICINE | Facility: CLINIC | Age: 49
End: 2019-06-21

## 2019-06-21 NOTE — TELEPHONE ENCOUNTER
Reason for Call:  Request for results:    Name of test or procedure: Ultra Sound    Date of test of procedure: 6/19    Location of the test or procedure: [facility]    OK to leave the result message on voice mail or with a family member? YES    Phone number Patient can be reached at:  Home number on file [phone] (home)    Additional comments: please call to review results    Call taken on 6/21/2019 at 12:16 PM by Mary Alcala.

## 2019-06-21 NOTE — TELEPHONE ENCOUNTER
I have talked with the pt about the US results and gave her a referral for surgery and gave her the number to call   Also , we went over the findings of the liver and recommendation to recheck US in 3 months or do an MRI and she prefers to do an MRI and I have sent an order for this and gave her the number to call and schedule this too   Thanks

## 2019-06-27 ENCOUNTER — ANCILLARY PROCEDURE (OUTPATIENT)
Dept: MRI IMAGING | Facility: CLINIC | Age: 49
End: 2019-06-27
Attending: FAMILY MEDICINE
Payer: COMMERCIAL

## 2019-06-27 DIAGNOSIS — R16.0 LIVER MASS, RIGHT LOBE: ICD-10-CM

## 2019-06-27 RX ORDER — GADOBUTROL 604.72 MG/ML
7.5 INJECTION INTRAVENOUS ONCE
Status: COMPLETED | OUTPATIENT
Start: 2019-06-27 | End: 2019-06-27

## 2019-06-27 RX ADMIN — GADOBUTROL 7.5 ML: 604.72 INJECTION INTRAVENOUS at 18:56

## 2019-06-28 ENCOUNTER — TELEPHONE (OUTPATIENT)
Dept: FAMILY MEDICINE | Facility: CLINIC | Age: 49
End: 2019-06-28

## 2019-06-28 DIAGNOSIS — D18.09 HEMANGIOMA OF OTHER SITES: Primary | ICD-10-CM

## 2019-06-28 LAB — RADIOLOGIST FLAGS: NORMAL

## 2019-06-28 NOTE — TELEPHONE ENCOUNTER
Per Dr. Rosa pt should be seen by hepatologist. Pt currently scheduled to see Dr. Leventhal 7/18/19.    Rossi Estrella LPN  Hepatology Clinic    -------  [facility] Call Center    Phone Message    May a detailed message be left on voicemail: no    Reason for Call: Other: Pt. called to schedule an appointment in Hepatology. She was referred by Yaneth Scott MD.  called Hepatology and spoke to CICI Dela Cruz who stated providers would need to look over patients chart to determine which provider pt. Should see. Please advise.    Action Taken: Message routed to:  [facility] ([facility]): Hepatology

## 2019-06-28 NOTE — TELEPHONE ENCOUNTER
I have called the pt and discussed the results of the MRI , maggie the liver findings and I have given her a referral for hepatology / GI the [facility]

## 2019-06-28 NOTE — TELEPHONE ENCOUNTER
Reason for Call:  Request for results:    Name of test or procedure: MRI    Date of test of procedure: 6/27/19    Location of the test or procedure: [facility]    OK to leave the result message on voice mail or with a family member? YES    Phone number Patient can be reached at:  Home number on file [phone] (home)    Additional comments:     Call taken on 6/28/2019 at 2:30 PM by Mily Cardenas

## 2019-06-28 NOTE — DISCHARGE INSTRUCTIONS
MRI Contrast Discharge Instructions    The IV contrast you received today will pass out of your body in your  urine. This will happen in the next 24 hours. You will not feel this process.  Your urine will not change color.    Drink at least 4 extra glasses of water or juice today (unless your doctor  has restricted your fluids). This reduces the stress on your kidneys.  You may take your regular medicines.    If you are on dialysis: It is best to have dialysis today.    If you have a reaction: Most reactions happen right away. If you have  any new symptoms after leaving the hospital (such as hives or swelling),  call your hospital at the correct number below. Or call your family doctor.  If you have breathing distress or wheezing, call 911.    Special instructions: ***    I have read and understand the above information.    Signature:______________________________________ Date:___________    Staff:__________________________________________ Date:___________     Time:__________    [facility] Departments:    ___[place]: [phone]  ___[facility]: [phone]  ___[place]: [phone] ___[facility]: [phone]  ___[facility]: [phone]  ___[facility]: [phone]  ___[phone]  ___[facility]: [phone]  ___[phone]

## 2019-07-15 NOTE — TELEPHONE ENCOUNTER
RECORDS RECEIVED FROM: (Internal) Hemangioma of other sites, referral from Dr. Scott   DATE RECEIVED: 07.18.2019   NOTES STATUS DETAILS   OFFICE NOTE from referring provider Internal 06.28.2019   OFFICE NOTES from other specialists Received    DISCHARGE SUMMARY from hospital N/A    MEDICATION LIST Internal    LIVER BIOSPY (IF APPLICABLE)      PATHOLOGY REPORTS  N/A    IMAGING     ENDOSCOPY (IF AVAILABLE) N/A    COLONOSCOPY (IF AVAILABLE) N/A    ULTRASOUND LIVER Internal 16.19.2019   CT OF ABDOMEN Internal 06.13.2019   MRI OF LIVER Internal 06.27.2019   FIBROSCAN, US ELASTOGRAPHY, FIBROSIS SCAN, MR ELASTOGRAPHY N/A    LABS     HEPATIC PANEL (LIVER PANEL) N/A    BASIC METABOLIC PANEL Internal 10.28.2019   COMPLETE METABOLIC PANEL Internal 04.03.2017   COMPLETE BLOOD COUNT (CBC) Internal 10.28.2018   INTERNATIONAL NORMALIZED RATIO (INR) N/A    HEPATITIS C ANTIBODY N/A    HEPATITIS C VIRAL LOAD/PCR N/A    HEPATITIS C GENOTYPE N/A    HEPATITIS B SURFACE ANTIGEN N/A    HEPATITIS B SURFACE ANTIBODY N/A    HEPATITIS B DNA QUANT LEVEL N/A    HEPATITIS B CORE ANTIBODY N/A

## 2019-07-18 ENCOUNTER — OFFICE VISIT (OUTPATIENT)
Dept: GASTROENTEROLOGY | Facility: CLINIC | Age: 49
End: 2019-07-18
Attending: INTERNAL MEDICINE
Payer: COMMERCIAL

## 2019-07-18 ENCOUNTER — PRE VISIT (OUTPATIENT)
Dept: GASTROENTEROLOGY | Facility: CLINIC | Age: 49
End: 2019-07-18

## 2019-07-18 VITALS
OXYGEN SATURATION: 96 % | TEMPERATURE: 99 F | DIASTOLIC BLOOD PRESSURE: 97 MMHG | BODY MASS INDEX: 30.01 KG/M2 | WEIGHT: 191.6 LBS | HEART RATE: 105 BPM | SYSTOLIC BLOOD PRESSURE: 141 MMHG

## 2019-07-18 DIAGNOSIS — K82.4 POLYP OF GALLBLADDER: ICD-10-CM

## 2019-07-18 DIAGNOSIS — R16.0 LIVER MASS: Primary | ICD-10-CM

## 2019-07-18 PROCEDURE — G0463 HOSPITAL OUTPT CLINIC VISIT: HCPCS | Mod: ZF

## 2019-07-18 ASSESSMENT — PAIN SCALES - GENERAL: PAINLEVEL: NO PAIN (0)

## 2019-07-18 NOTE — PROGRESS NOTES
Date of Service: 2019     Referring Provider: Yaneth Scott MD    Subjective:            Janice Morgan is a 48 year old female presenting for evaluation of liver mass/abnormal imaging    History of Present Illness   Janice Morgan is a 48 year old female with past medical history of Hashimoto's thyroiditis who presents with concerns of abnormal imaging.    She notes that she was recently in [place] for 1 [place] in a rural area.  Secondary to concerns of sanitation she had a significant change in her bowel movements and a degree of anal retention, ultimately returning to [place] with significant discomfort from acute constipation. In evaluation of her abdominal pain she underwent an ultrasound which demonstrated an unspecified hepatic lesion and some gallbladder polyps.  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  There was another subcentimeter lesion noted in the urine which was nonspecific.  Also noted on the scan were 2 polyps noted within the gallbladder, the largest of which was 10 mm.  She notes that she is now having bowel movements with regularity and that her abdominal pain is significantly improved.    She also reports that she has been referred to a general surgeon for evaluation for cholecystectomy tomorrow.    Past Medical History:  Past Medical History:   Diagnosis Date     Cervical high risk HPV (human papillomavirus) test positive 10/2014    NIL pap, + HPV (not 16 or 18) '16 HPV neg     Hashimoto's thyroiditis 10/8/2010       Surgical History:  Past Surgical History:   Procedure Laterality Date     C ANALGESIA,EPIDURAL,LABOR &   , 2005     HC EXCISION BREAST LESION, OPEN >=1  2001     LAPAROSCOPIC APPENDECTOMY  2013    Procedure: LAPAROSCOPIC APPENDECTOMY;  Laparoscopic Appendectomy;  Surgeon: Mercy Leal  MD Sh[place];  Location:  OR       Social History:  Social History     Tobacco Use     Smoking status: Former Smoker     Smokeless tobacco: Never Used     Tobacco comment: quit for 4 years.   Substance Use Topics     Alcohol use: Yes     Comment: occasionally     Drug use: No       Family History:  Family History   Problem Relation Age of Onset     Asthma Mother      Psychotic Disorder Mother      Musculoskeletal Disorder Mother         fibromyalgia     C.A.D. Maternal Grandfather      Heart Disease Maternal Grandfather      Cerebrovascular Disease Maternal Grandfather      Diabetes Paternal Grandfather      Cancer Maternal Grandmother         lung     Cancer Paternal Grandmother         stomach       Medications:  Current Outpatient Medications   Medication     HERBALS     levothyroxine (SYNTHROID) 25 MCG tablet     polyethylene glycol (MIRALAX/GLYCOLAX) powder     atovaquone-proguanil (MALARONE) 250-100 MG tablet     ciprofloxacin (CIPRO) 500 MG tablet     GABAPENTIN PO     typhoid (VIVOTIF) CR capsule     No current facility-administered medications for this visit.        Review of Systems  A complete 10 point review of systems was asked and answered in the negative unless specifically commented upon in the HPI    Objective:         Vitals:    07/18/19 0942   BP: (!) 141/97   BP Location: Right arm   Pulse: 105   Temp: 99  F (37.2  C)   TempSrc: Oral   SpO2: 96%   Weight: 86.9 kg (191 lb 9.6 oz)     Body mass index is 30.01 kg/m .     Physical Exam  Constitutional: Well-developed, well-nourished, in no apparent distress.    HEENT: Normocephalic. no scleral icterus. Moist oral mucosa. Dentition normal  Neck/Lymph: Normal ROM, supple. No thyromegaly.  No lymphadenopathy  Cardiac:  Regular rate and rhythm.  No overt murmurs  Respiratory: Clear to auscultation bilaterally.  No wheezes or rales  GI:  Abdomen soft, non-distended, non-tender. BS present. no shifting dullness. No overt hepatosplenomegaly.     Skin:   Skin is warm and dry. No rash noted.  no jaundice. no spider nevi noted.  no palmar erythema  Peripheral Vascular: no lower extremity edema. 2+ pulses in all extremities  Musculoskeletal:  ROM intact, normal muscle bulk    Psychiatric: Normal mood and affect. Behavior is normal.  Neuro:  no asterixis, no tremor    Labs and Diagnostic tests:  Lab Results   Component Value Date    BILITOTAL 0.5 2017    ALT 28 2017    AST 12 2017    ALKPHOS 91 2017     Lab Results   Component Value Date    ALBUMIN 4.2 2017    PROTTOTAL 7.7 2017        Imagin2019  FINDINGS:     Comparison study: CT abdomen and pelvis 2019, 2013 abdominal  ultrasound 2019.     Liver: 1.5 cm mildly T2 hyperintense, T1 hypointense nonmasslike  poorly defined enhancing lesion with progressive central enhancement  within the medial aspect of the hepatic segment 7, adjacent to the  right hepatic vein (series 7, image 26), demonstrates mild restricted  diffusion.  Few subcentimeter T2 hyperintense nonenhancing foci mostly in left  hepatic lobe, likely small hepatic cysts. 4 mm enhancing foci in the  anterior hepatic segment 3 (series 8, image 40).  Gallbladder: Partially filled. Gallbladder contains 2 well-defined  round polyps, largest measuring 10 mm which significantly increased in  size since 2013 which was 3 mm. Additional smaller polyp measures 4 mm.  Spleen: Unremarkable  Kidneys: No hydronephrosis. 5 mm left upper pole simple renal cyst.  Adrenal glands: Unremarkable  Pancreas: Normal homogeneous T1 signal throughout the pancreas. No effusion.  Bowel: Partially visualized bowels are unremarkable.  Lymph nodes: No adenopathy in the abdomen and pelvis.  Blood vessels: The major intra-abdominal vessels are patent.  Lung bases: Unremarkable  Bones and soft tissues: No suspicious osseous lesion.  Mesentery and abdominal wall: Unremarkable  Ascites: None                                                         IMPRESSION:  1. 1.5 cm poorly defined enhancing lesion without washout in hepatic  segment 7, at the site of ultrasound finding (on 6/19/2019). This does  not have aggressive features and although indeterminate a sclerosed  hemangioma would be expected to have this appearance. Consider  attention on follow-up in 6-12 month.  2. Nonspecific 4 mm arterially enhancing foci in the anterior hepatic  segment 3.  3. Two gallbladder polyps, largest measuring 10 mm which increased in  size since 2013. Recommend for surgical consultation.         Assessment and Plan:    Abnormal Liver Imaging:    -Review of imaging notes that there is a segment 7 lesion that is 1.5 cm in nature and appears to be consistent with a sclerosed hemangioma.  However, based on the assessment, it was felt that there is an appropriate need for follow-up imaging  -I have ordered a repeat MRI with and without contrast to be performed in approximately the next 6 to 12 months at the patient's convenience in order to assess for dynamic temporal changes in size or character.  If this remains stable, there is no further need for routine follow-up    Gallbladder Polyps:  -Abdominal ultrasound and MRI do demonstrate at least 2 gallbladder polyps, the largest of which is at least 1 cm in diameter  -Should be noted the gallbladder polyps are a risk factor for the development of gallbladder cancer, and that larger size polyps have an increased risk  -Agree with referral to general surgery for evaluation of a cholecystectomy, with her indication being risk modification for developing gallbladder cancer    Follow Up:  As needed     Thank you very much for the opportunity to participate in the care of this patient.  If you have any further questions, please don't hesitate to contact our office.    Thomas M. Leventhal, M.D.   of Medicine  Advanced & Transplant Hepatology  [facility]

## 2019-07-18 NOTE — NURSING NOTE
"Chief Complaint   Patient presents with     Consult     Abnormal labs, cts, mri, ultrasound     Vital signs:  Temp: 99  F (37.2  C) Temp src: Oral BP: (!) 141/97 Pulse: 105     SpO2: 96 %       Weight: 86.9 kg (191 lb 9.6 oz)  Estimated body mass index is 30.01 kg/m  as calculated from the following:    Height as of 6/12/19: 1.702 m (5' 7\").    Weight as of this encounter: 86.9 kg (191 lb 9.6 oz).      Cheryl Small CMA    "

## 2019-07-18 NOTE — LETTER
2019      RE: Janice Morgan  [address] [facility] [address]       Date of Service: 2019     Referring Provider: Yaneth Scott MD    Subjective:            Janice Morgan is a 48 year old female presenting for evaluation of liver mass/abnormal imaging    History of Present Illness   Janice Morgan is a 48 year old female with past medical history of Hashimoto's thyroiditis who presents with concerns of abnormal imaging.    She notes that she was recently in [place].  Secondary to concerns of sanitation she had a significant change in her bowel movements and a degree of anal retention, ultimately returning to [place] with significant discomfort from acute constipation. In evaluation of her abdominal pain she underwent an ultrasound which demonstrated an unspecified hepatic lesion and some gallbladder polyps.  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  There was another subcentimeter lesion noted in the urine which was nonspecific.  Also noted on the scan were 2 polyps noted within the gallbladder, the largest of which was 10 mm.  She notes that she is now having bowel movements with regularity and that her abdominal pain is significantly improved.    She also reports that she has been referred to a general surgeon for evaluation for cholecystectomy tomorrow.    Past Medical History:  Past Medical History:   Diagnosis Date     Cervical high risk HPV (human papillomavirus) test positive 10/2014    NIL pap, + HPV (not 16 or 18) '16 HPV neg     Hashimoto's thyroiditis 10/8/2010       Surgical History:  Past Surgical History:   Procedure Laterality Date     C ANALGESIA,EPIDURAL,LABOR &   , 2005      EXCISION BREAST LESION, OPEN >=1  2001     LAPAROSCOPIC APPENDECTOMY  2013    Procedure:  LAPAROSCOPIC APPENDECTOMY;  Laparoscopic Appendectomy;  Surgeon: Mercy Leal MD;  Location: [facility]       Social History:  Social History     Tobacco Use     Smoking status: Former Smoker     Smokeless tobacco: Never Used     Tobacco comment: quit for 4 years.   Substance Use Topics     Alcohol use: Yes     Comment: occasionally     Drug use: No       Family History:  Family History   Problem Relation Age of Onset     Asthma Mother      Psychotic Disorder Mother      Musculoskeletal Disorder Mother         fibromyalgia     C.A.D. Maternal Grandfather      Heart Disease Maternal Grandfather      Cerebrovascular Disease Maternal Grandfather      Diabetes Paternal Grandfather      Cancer Maternal Grandmother         lung     Cancer Paternal Grandmother         stomach       Medications:  Current Outpatient Medications   Medication     HERBALS     levothyroxine (SYNTHROID) 25 MCG tablet     polyethylene glycol (MIRALAX/GLYCOLAX) powder     atovaquone-proguanil (MALARONE) 250-100 MG tablet     ciprofloxacin (CIPRO) 500 MG tablet     GABAPENTIN PO     typhoid (VIVOTIF) CR capsule     No current facility-administered medications for this visit.        Review of Systems  A complete 10 point review of systems was asked and answered in the negative unless specifically commented upon in the HPI    Objective:         Vitals:    07/18/19 0942   BP: (!) 141/97   BP Location: Right arm   Pulse: 105   Temp: 99  F (37.2  C)   TempSrc: Oral   SpO2: 96%   Weight: 86.9 kg (191 lb 9.6 oz)     Body mass index is 30.01 kg/m .     Physical Exam  Constitutional: Well-developed, well-nourished, in no apparent distress.    HEENT: Normocephalic. no scleral icterus. Moist oral mucosa. Dentition normal  Neck/Lymph: Normal ROM, supple. No thyromegaly.  No lymphadenopathy  Cardiac:  Regular rate and rhythm.  No overt murmurs  Respiratory: Clear to auscultation bilaterally.  No wheezes or rales  GI:  Abdomen soft, non-distended,  non-tender. BS present. no shifting dullness. No overt hepatosplenomegaly.     Skin:  Skin is warm and dry. No rash noted.  no jaundice. no spider nevi noted.  no palmar erythema  Peripheral Vascular: no lower extremity edema. 2+ pulses in all extremities  Musculoskeletal:  ROM intact, normal muscle bulk    Psychiatric: Normal mood and affect. Behavior is normal.  Neuro:  no asterixis, no tremor    Labs and Diagnostic tests:  Lab Results   Component Value Date    BILITOTAL 0.5 2017    ALT 28 2017    AST 12 2017    ALKPHOS 91 2017     Lab Results   Component Value Date    ALBUMIN 4.2 2017    PROTTOTAL 7.7 2017        Imagin2019  FINDINGS:     Comparison study: CT abdomen and pelvis 2019, 2013 abdominal  ultrasound 2019.     Liver: 1.5 cm mildly T2 hyperintense, T1 hypointense nonmasslike  poorly defined enhancing lesion with progressive central enhancement  within the medial aspect of the hepatic segment 7, adjacent to the  right hepatic vein (series 7, image 26), demonstrates mild restricted  diffusion.  Few subcentimeter T2 hyperintense nonenhancing foci mostly in left  hepatic lobe, likely small hepatic cysts. 4 mm enhancing foci in the  anterior hepatic segment 3 (series 8, image 40).  Gallbladder: Partially filled. Gallbladder contains 2 well-defined  round polyps, largest measuring 10 mm which significantly increased in  size since  which was 3 mm. Additional smaller polyp measures 4 mm.  Spleen: Unremarkable  Kidneys: No hydronephrosis. 5 mm left upper pole simple renal cyst.  Adrenal glands: Unremarkable  Pancreas: Normal homogeneous T1 signal throughout the pancreas. No effusion.  Bowel: Partially visualized bowels are unremarkable.  Lymph nodes: No adenopathy in the abdomen and pelvis.  Blood vessels: The major intra-abdominal vessels are patent.  Lung bases: Unremarkable  Bones and soft tissues: No suspicious osseous lesion.  Mesentery and  abdominal wall: Unremarkable  Ascites: None                                                        IMPRESSION:  1. 1.5 cm poorly defined enhancing lesion without washout in hepatic  segment 7, at the site of ultrasound finding (on 6/19/2019). This does  not have aggressive features and although indeterminate a sclerosed  hemangioma would be expected to have this appearance. Consider  attention on follow-up in 6-12 month.  2. Nonspecific 4 mm arterially enhancing foci in the anterior hepatic  segment 3.  3. Two gallbladder polyps, largest measuring 10 mm which increased in  size since 2013. Recommend for surgical consultation.         Assessment and Plan:    Abnormal Liver Imaging:    -Review of imaging notes that there is a segment 7 lesion that is 1.5 cm in nature and appears to be consistent with a sclerosed hemangioma.  However, based on the assessment, it was felt that there is an appropriate need for follow-up imaging  -I have ordered a repeat MRI with and without contrast to be performed in approximately the next 6 to 12 months at the patient's convenience in order to assess for dynamic temporal changes in size or character.  If this remains stable, there is no further need for routine follow-up    Gallbladder Polyps:  -Abdominal ultrasound and MRI do demonstrate at least 2 gallbladder polyps, the largest of which is at least 1 cm in diameter  -Should be noted the gallbladder polyps are a risk factor for the development of gallbladder cancer, and that larger size polyps have an increased risk  -Agree with referral to general surgery for evaluation of a cholecystectomy, with her indication being risk modification for developing gallbladder cancer    Follow Up:  As needed     Thank you very much for the opportunity to participate in the care of this patient.  If you have any further questions, please don't hesitate to contact our office.    Thomas M. Leventhal, M.D.   of Medicine  [facility]  [facility]

## 2019-07-19 ENCOUNTER — OFFICE VISIT (OUTPATIENT)
Dept: SURGERY | Facility: CLINIC | Age: 49
End: 2019-07-19
Payer: COMMERCIAL

## 2019-07-19 VITALS
BODY MASS INDEX: 29.98 KG/M2 | SYSTOLIC BLOOD PRESSURE: 120 MMHG | DIASTOLIC BLOOD PRESSURE: 78 MMHG | WEIGHT: 191 LBS | HEART RATE: 92 BPM | HEIGHT: 67 IN

## 2019-07-19 DIAGNOSIS — K82.4 GALLBLADDER POLYP: Primary | ICD-10-CM

## 2019-07-19 PROCEDURE — 99244 OFF/OP CNSLTJ NEW/EST MOD 40: CPT | Performed by: SURGERY

## 2019-07-19 ASSESSMENT — MIFFLIN-ST. JEOR: SCORE: 1519

## 2019-07-19 NOTE — PROGRESS NOTES
[facility] General Surgery Clinic Consultation    CHIEF COMPLAINT:  Gallbladder polyps    HISTORY OF PRESENT ILLNESS:  Janice Morgan is a 48 year old female who is seen in consultation at the request of Dr. Scott for evaluation of gallbladder polyps. She had a CT abdomen/pelvis, abdominal US and abdominal MRI in  of this year. CT revealed an enlarging gallbladder polyp. 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 She denies any current abdominal pain. The CT she had was for left lower quadrant pain. She had been traveling for work and was very constipated and having severe LLQ pain. She had the CT which the above results. She has been on a more aggressive bowel regimen and been having improved constipation symptoms and pain. She denies any biliary symptoms. No right upper quadrant pain. She has had several family members require cholecystectomy due to stones/infection.     REVIEW OF SYSTEMS:  10 point review of systems completed and otherwise negative aside from as listed in HPI.     Past Medical History:   Diagnosis Date     Cervical high risk HPV (human papillomavirus) test positive 10/2014    NIL pap, + HPV (not 16 or 18) '16 HPV neg     Hashimoto's thyroiditis 10/8/2010       Past Surgical History:   Procedure Laterality Date     C ANALGESIA,EPIDURAL,LABOR &   , 2005     HC EXCISION BREAST LESION, OPEN >=1  2001     LAPAROSCOPIC APPENDECTOMY  2013    Procedure: LAPAROSCOPIC APPENDECTOMY;  Laparoscopic Appendectomy;  Surgeon: Mercy Leal MD;  Location:  OR       Family History   Problem Relation Age of Onset     Asthma Mother      Psychotic Disorder  Mother      Musculoskeletal Disorder Mother         fibromyalgia     C.A.D. Maternal Grandfather      Heart Disease Maternal Grandfather      Cerebrovascular Disease Maternal Grandfather      Diabetes Paternal Grandfather      Cancer Maternal Grandmother         lung     Cancer Paternal Grandmother         stomach       Social History     Tobacco Use     Smoking status: Former Smoker     Smokeless tobacco: Never Used     Tobacco comment: quit for 4 years.   Substance Use Topics     Alcohol use: Yes     Comment: occasionally       Patient Active Problem List   Diagnosis     Hashimoto's thyroiditis     CARDIOVASCULAR SCREENING; LDL GOAL LESS THAN 160     Moderate major depression (H)     Migraine     Fevers, unknown origin     Cervical high risk HPV (human papillomavirus) test positive     Menopausal syndrome (hot flashes)     Overweight     Major depressive disorder, recurrent episode, moderate (H)     Anxiety       Allergies   Allergen Reactions     Erythromycin Hives     Tape [Adhesive Tape]      Colth tape is the one that gives her the reaction.Burn type reacton , raw.     Penicillin [Penicillins]      Family alergies to the antibiotic. So it was never given to her.       Current Outpatient Medications   Medication Sig Dispense Refill     atovaquone-proguanil (MALARONE) 250-100 MG tablet Take 1 tablet by mouth daily Start 1 day before travelling to a Malaria risk area and continue until 7 days after return. (Patient not taking: Reported on 6/12/2019) 14 tablet 0     ciprofloxacin (CIPRO) 500 MG tablet Take one tablet twice a day for up to 3 days as needed for traveler's diarrhea (Patient not taking: Reported on 6/12/2019) 6 tablet 0     GABAPENTIN PO        HERBALS        levothyroxine (SYNTHROID) 25 MCG tablet Take 250 mcg by mouth daily Take  Half on Friday  Nothing on Saturdays 30 tablet 6     polyethylene glycol (MIRALAX/GLYCOLAX) powder Take 17 g (1 capful) by mouth daily With a big glass of water 1 Bottle 0  "    typhoid (VIVOTIF) CR capsule Take 1 capsule by mouth every other day (Patient not taking: Reported on 6/12/2019) 4 capsule 0       Vitals: /78   Pulse 92   Ht 1.702 m (5' 7\")   Wt 86.6 kg (191 lb)   LMP 08/12/2016   BMI 29.91 kg/m    BMI= Body mass index is 29.91 kg/m .    EXAM:  GENERAL: healthy, alert and no distress   PSYCH: pleasant, normal affect  HEENT: moist mucus membranes, no scleral icterus  CARDIOVASCULAR:  RRR  RESPIRATORY: non labored breathing  NECK: Neck supple. No adenopathy. Thyroid symmetric, normal size,  GI: soft, nontender, nondistended, no hernias palpable, no hepatosplenomegaly, normal bowel sounds  Extremities: warm and well perfused, no edema  SKIN: No suspicious lesions or rashes    LYMPH: no axillary adenopathy    All labs and imaging personally reviewed and significant for: including her US from 6/19/2019 as well as her MRI from 6/27/2019    ASSESSMENT:  Janice Morgan is a 48 year old with a PMH s/f migraines and hashimotos thyroiditis who presents with gallbladder polyps which have increased in size over the past two years, the largest measuring 1.4cm, previously 1.0cm in 2017.     PLAN:   I discussed the pathophysiology of gallbladder disease, gallbladder polyps, and complications of cholecystitis and choledocholithiasis with the patient. We discussed with gallbladder polyps between 1-2cm in size there is increased risk of malignancy (40-70% risk) and as such, cholecystectomy is recommended.  I also discussed the risks associated with the procedure including, but not limited to infection, bleeding, conversion to open, bile leak, bile duct injury, retained gallstones, pneumonia, MI, and anesthesia complications with the patient.  I also discussed if a complication did occur it may require further surgical intervention during or after the procedure. The patient indicated understanding of the discussion, asked appropriate questions, and provided consent. I provided the " patient an information pamphlet. I have recommended a low fat diet and instructed the patient to go to ER if they developed persistent pain, persistent nausea and vomiting, or yellowness of skin.    It was my pleasure to participate in the care of Janice Morgan in clinic today. Thank you for this consultation.     Marlene Taylor MD  [facility]  [phone]    Please route or send letter to:  Primary Care Provider (PCP) and Referring Provider

## 2019-07-21 ENCOUNTER — TELEPHONE (OUTPATIENT)
Dept: SURGERY | Facility: PHYSICIAN GROUP | Age: 49
End: 2019-07-21

## 2019-07-21 NOTE — TELEPHONE ENCOUNTER
Type of surgery: Lap moisés  Location of surgery: [facility]  Date and time of surgery: 8/28/19 at 12:45pm  Surgeon: Dr. Marlene Taylor  Pre-Op Appt Date: Patient to schedule  Post-Op Appt Date: Patient to schedule  Packet sent out: Yes  Pre-cert/Authorization completed:  N/A  Date: 7/21/19

## 2019-08-13 ENCOUNTER — OFFICE VISIT (OUTPATIENT)
Dept: FAMILY MEDICINE | Facility: CLINIC | Age: 49
End: 2019-08-13
Payer: COMMERCIAL

## 2019-08-13 VITALS
WEIGHT: 194.1 LBS | HEIGHT: 67 IN | BODY MASS INDEX: 30.46 KG/M2 | HEART RATE: 109 BPM | TEMPERATURE: 99.2 F | SYSTOLIC BLOOD PRESSURE: 120 MMHG | DIASTOLIC BLOOD PRESSURE: 85 MMHG | OXYGEN SATURATION: 96 %

## 2019-08-13 DIAGNOSIS — K82.4 GALLBLADDER POLYP: ICD-10-CM

## 2019-08-13 DIAGNOSIS — Z01.818 PREOP GENERAL PHYSICAL EXAM: Primary | ICD-10-CM

## 2019-08-13 PROCEDURE — 99214 OFFICE O/P EST MOD 30 MIN: CPT | Performed by: FAMILY MEDICINE

## 2019-08-13 RX ORDER — GABAPENTIN 100 MG/1
CAPSULE ORAL
Status: ON HOLD | COMMUNITY
Start: 2019-07-18 | End: 2019-08-21

## 2019-08-13 RX ORDER — LEVOTHYROXINE SODIUM 175 UG/1
175 TABLET ORAL EVERY EVENING
COMMUNITY
Start: 2019-07-18 | End: 2020-07-17

## 2019-08-13 ASSESSMENT — MIFFLIN-ST. JEOR: SCORE: 1528.06

## 2019-08-13 NOTE — PROGRESS NOTES
[facility]  [address]  [facility] [address] [phone]  Dept: [phone]    PRE-OP EVALUATION:  Today's date: 2019    Janice Morgan (: 1970) presents for pre-operative evaluation assessment as requested by Marlene Galan MD.  She requires evaluation and anesthesia risk assessment prior to undergoing surgery/procedure for treatment of gallbladder polyps , cholecystectomy. .    Proposed Surgery/ Procedure: LAPAROSCOPIC  CHOLECYSTECTOMY  Date of Surgery/ Procedure: 2019  Time of Surgery/ Procedure: 7:30am  Hospital/Surgical Facility:  OR  Fax number for surgical facility:   Primary Physician: Yaneth Scott  Type of Anesthesia Anticipated: General    Patient has a Health Care Directive or Living Will:  NO    1. NO - Do you have a history of heart attack, stroke, stent, bypass or surgery on an artery in the head, neck, heart or legs?  2. NO - Do you ever have any pain or discomfort in your chest?  3. NO - Do you have a history of  Heart Failure?  4. NO - Are you troubled by shortness of breath when: walking on the level, up a slight hill or at night?  5. NO - Do you currently have a cold, bronchitis or other respiratory infection?  6. NO - Do you have a cough, shortness of breath or wheezing?  7. NO - Do you sometimes get pains in the calves of your legs when you walk?  8.yes- Do you or anyone in your family have previous history of blood clots? Dad  9. NO - Do you or does anyone in your family have a serious bleeding problem such as prolonged bleeding following surgeries or cuts?  10. NO - Have you ever had problems with anemia or been told to take iron pills?  11. NO - Have you had any abnormal blood loss such as black, tarry or bloody stools, or abnormal vaginal bleeding?  12. NO - Have you ever had a blood transfusion?  13. yes - Have you or any of your relatives ever had problems with anesthesia? Mom  14. NO - Do you have sleep apnea, excessive  snoring or daytime drowsiness?  15. NO - Do you have any prosthetic heart valves?  16. NO - Do you have prosthetic joints?  17. NO - Is there any chance that you may be pregnant?      HPI:     HPI related to upcoming procedure:       See problem list for active medical problems.  Problems all longstanding and stable, except as noted/documented.  See ROS for pertinent symptoms related to these conditions.      MEDICAL HISTORY:     Patient Active Problem List    Diagnosis Date Noted     Anxiety 2016     Priority: Medium     Menopausal syndrome (hot flashes) 2016     Priority: Medium     Overweight 2016     Priority: Medium     Major depressive disorder, recurrent episode, moderate (H) 2016     Priority: Medium     Cervical high risk HPV (human papillomavirus) test positive 10/08/2014     Priority: Medium     10/8/14: NIL pap, + HPV (not 16 or 18). Plan cotest pap & HPV in 1 year.  Tracking started.  2016 Pap NIL, neg HPV, cotest three years  2018 Pap: NIL/neg HPV         Fevers, unknown origin 2013     Priority: Medium     Problem list name updated by automated process. Provider to review and confirm       Migraine 2012     Priority: Medium     Moderate major depression (H) 2012     Priority: Medium     CARDIOVASCULAR SCREENING; LDL GOAL LESS THAN 160 10/31/2010     Priority: Medium     Hashimoto's thyroiditis 10/08/2010     Priority: Medium      Past Medical History:   Diagnosis Date     Cervical high risk HPV (human papillomavirus) test positive 10/2014    NIL pap, + HPV (not 16 or 18) '16 HPV neg     Hashimoto's thyroiditis 10/8/2010     Past Surgical History:   Procedure Laterality Date     C ANALGESIA,EPIDURAL,LABOR &   , 2005      EXCISION BREAST LESION, OPEN >=1  2001     LAPAROSCOPIC APPENDECTOMY  2013    Procedure: LAPAROSCOPIC APPENDECTOMY;  Laparoscopic Appendectomy;  Surgeon: Mercy Leal MD;  Location:  OR  "    Current Outpatient Medications   Medication Sig Dispense Refill     GABAPENTIN PO        HERBALS        levothyroxine (SYNTHROID) 25 MCG tablet Take 250 mcg by mouth daily Take  Half on Friday  Nothing on Saturdays 30 tablet 6     polyethylene glycol (MIRALAX/GLYCOLAX) powder Take 17 g (1 capful) by mouth daily With a big glass of water 1 Bottle 0     OTC products: None, except as noted above    Allergies   Allergen Reactions     Erythromycin Hives     Tape [Adhesive Tape]      Colth tape is the one that gives her the reaction.Burn type reacton , raw.     Penicillin [Penicillins]      Family alergies to the antibiotic. So it was never given to her.      Latex Allergy: NO    Social History     Tobacco Use     Smoking status: Former Smoker     Smokeless tobacco: Never Used     Tobacco comment: quit for 4 years.   Substance Use Topics     Alcohol use: Yes     Comment: 1-2 drinks per month     History   Drug Use No       REVIEW OF SYSTEMS:   Constitutional, neuro, ENT, endocrine, pulmonary, cardiac, gastrointestinal, genitourinary, musculoskeletal, integument and psychiatric systems are negative, except as otherwise noted.    EXAM:   /85   Pulse 109   Temp 99.2  F (37.3  C) (Oral)   Ht 1.702 m (5' 7\")   Wt 88 kg (194 lb 1.6 oz)   LMP 08/12/2016   SpO2 96%   BMI 30.40 kg/m      GENERAL APPEARANCE: healthy, alert and no distress     EYES: EOMI, PERRL     HENT: ear canals and TM's normal and nose and mouth without ulcers or lesions     NECK: no adenopathy, no asymmetry, masses, or scars and thyroid normal to palpation     RESP: lungs clear to auscultation - no rales, rhonchi or wheezes     CV: regular rates and rhythm, normal S1 S2, no S3 or S4 and no murmur, click or rub     ABDOMEN:  soft, nontender, no HSM or masses and bowel sounds normal     MS: extremities normal- no gross deformities noted, no evidence of inflammation in joints, FROM in all extremities.     SKIN: no suspicious lesions or rashes     " NEURO: Normal strength and tone, sensory exam grossly normal, mentation intact and speech normal     PSYCH: mentation appears normal. and affect normal/bright     LYMPHATICS: No cervical adenopathy    DIAGNOSTICS:   No labs or EKG required for low risk surgery (cataract, skin procedure, breast biopsy, etc)    Recent Labs   Lab Test 10/18/18  0100 04/03/17  1151  04/20/13  1308   HGB 14.4 14.2   < > 13.3    253   < > 183   INR  --   --   --  1.04    140   < > 144   POTASSIUM 3.5 3.9   < > 4.1   CR 0.81 0.83   < > 0.86    < > = values in this interval not displayed.        IMPRESSION:   Reason for surgery/procedure: Gallbladder polyp, Laparoscopic cholecystectomy   Diagnosis/reason for consult: Preoperative clearing     The proposed surgical procedure is considered INTERMEDIATE risk.    REVISED CARDIAC RISK INDEX  The patient has the following serious cardiovascular risks for perioperative complications such as (MI, PE, VFib and 3  AV Block):  No serious cardiac risks      The patient has the following additional risks for perioperative complications:  No identified additional risks      ICD-10-CM    1. Preop general physical exam Z01.818    2. Gallbladder polyp K82.4        RECOMMENDATIONS:         --Patient is to take all scheduled medications on the day of surgery EXCEPT for modifications listed below.    APPROVAL GIVEN to proceed with proposed procedure, without further diagnostic evaluation       Signed Electronically by: Yaneth Scott MD    Copy of this evaluation report is provided to requesting physician.    [place] Preop Guidelines    Revised Cardiac Risk Index

## 2019-08-20 ENCOUNTER — TELEPHONE (OUTPATIENT)
Dept: SURGERY | Facility: CLINIC | Age: 49
End: 2019-08-20

## 2019-08-20 NOTE — TELEPHONE ENCOUNTER
Disability forms faxed to [facility] [employer] per patient request.    Nicky Villarreal BSN, RN, OCN  Oncology Care Coordinator  [facility]  [phone]

## 2019-08-21 ENCOUNTER — APPOINTMENT (OUTPATIENT)
Dept: SURGERY | Facility: PHYSICIAN GROUP | Age: 49
End: 2019-08-21
Payer: COMMERCIAL

## 2019-08-21 ENCOUNTER — ANESTHESIA EVENT (OUTPATIENT)
Dept: SURGERY | Facility: CLINIC | Age: 49
End: 2019-08-21
Payer: COMMERCIAL

## 2019-08-21 ENCOUNTER — ANESTHESIA (OUTPATIENT)
Dept: SURGERY | Facility: CLINIC | Age: 49
End: 2019-08-21
Payer: COMMERCIAL

## 2019-08-21 ENCOUNTER — HOSPITAL ENCOUNTER (OUTPATIENT)
Facility: CLINIC | Age: 49
Discharge: HOME | End: 2019-08-21
Attending: SURGERY | Admitting: SURGERY
Payer: COMMERCIAL

## 2019-08-21 VITALS
TEMPERATURE: 97.8 F | HEART RATE: 76 BPM | WEIGHT: 192.6 LBS | SYSTOLIC BLOOD PRESSURE: 120 MMHG | RESPIRATION RATE: 12 BRPM | DIASTOLIC BLOOD PRESSURE: 75 MMHG | BODY MASS INDEX: 30.23 KG/M2 | HEIGHT: 67 IN | OXYGEN SATURATION: 99 %

## 2019-08-21 DIAGNOSIS — Z90.49 S/P LAPAROSCOPIC CHOLECYSTECTOMY: ICD-10-CM

## 2019-08-21 DIAGNOSIS — K82.4 GALLBLADDER POLYP: Primary | ICD-10-CM

## 2019-08-21 PROCEDURE — 25000132 ZZH RX MED GY IP 250 OP 250 PS 637: Performed by: SURGERY

## 2019-08-21 PROCEDURE — 88304 TISSUE EXAM BY PATHOLOGIST: CPT | Mod: 26 | Performed by: SURGERY

## 2019-08-21 PROCEDURE — 25000125 ZZHC RX 250: Performed by: NURSE ANESTHETIST, CERTIFIED REGISTERED

## 2019-08-21 PROCEDURE — 71000012 ZZH RECOVERY PHASE 1 LEVEL 1 FIRST HR: Performed by: SURGERY

## 2019-08-21 PROCEDURE — 25000125 ZZHC RX 250: Performed by: SURGERY

## 2019-08-21 PROCEDURE — 25000566 ZZH SEVOFLURANE, EA 15 MIN: Performed by: SURGERY

## 2019-08-21 PROCEDURE — 37000009 ZZH ANESTHESIA TECHNICAL FEE, EACH ADDTL 15 MIN: Performed by: SURGERY

## 2019-08-21 PROCEDURE — 36000056 ZZH SURGERY LEVEL 3 1ST 30 MIN: Performed by: SURGERY

## 2019-08-21 PROCEDURE — 25000128 H RX IP 250 OP 636: Performed by: ANESTHESIOLOGY

## 2019-08-21 PROCEDURE — 71000013 ZZH RECOVERY PHASE 1 LEVEL 1 EA ADDTL HR: Performed by: SURGERY

## 2019-08-21 PROCEDURE — 37000008 ZZH ANESTHESIA TECHNICAL FEE, 1ST 30 MIN: Performed by: SURGERY

## 2019-08-21 PROCEDURE — 40000170 ZZH STATISTIC PRE-PROCEDURE ASSESSMENT II: Performed by: SURGERY

## 2019-08-21 PROCEDURE — 47562 LAPAROSCOPIC CHOLECYSTECTOMY: CPT | Performed by: SURGERY

## 2019-08-21 PROCEDURE — 88304 TISSUE EXAM BY PATHOLOGIST: CPT | Performed by: SURGERY

## 2019-08-21 PROCEDURE — 25000128 H RX IP 250 OP 636: Performed by: NURSE ANESTHETIST, CERTIFIED REGISTERED

## 2019-08-21 PROCEDURE — 25800030 ZZH RX IP 258 OP 636: Performed by: NURSE ANESTHETIST, CERTIFIED REGISTERED

## 2019-08-21 PROCEDURE — 36000058 ZZH SURGERY LEVEL 3 EA 15 ADDTL MIN: Performed by: SURGERY

## 2019-08-21 PROCEDURE — 71000027 ZZH RECOVERY PHASE 2 EACH 15 MINS: Performed by: SURGERY

## 2019-08-21 PROCEDURE — 27210794 ZZH OR GENERAL SUPPLY STERILE: Performed by: SURGERY

## 2019-08-21 RX ORDER — ONDANSETRON 4 MG/1
4 TABLET, ORALLY DISINTEGRATING ORAL EVERY 30 MIN PRN
Status: DISCONTINUED | OUTPATIENT
Start: 2019-08-21 | End: 2019-08-21 | Stop reason: HOSPADM

## 2019-08-21 RX ORDER — BUPIVACAINE HYDROCHLORIDE AND EPINEPHRINE 5; 5 MG/ML; UG/ML
INJECTION, SOLUTION EPIDURAL; INTRACAUDAL; PERINEURAL
Status: DISCONTINUED
Start: 2019-08-21 | End: 2019-08-21 | Stop reason: HOSPADM

## 2019-08-21 RX ORDER — HYDROMORPHONE HYDROCHLORIDE 1 MG/ML
.3-.5 INJECTION, SOLUTION INTRAMUSCULAR; INTRAVENOUS; SUBCUTANEOUS EVERY 10 MIN PRN
Status: DISCONTINUED | OUTPATIENT
Start: 2019-08-21 | End: 2019-08-21 | Stop reason: HOSPADM

## 2019-08-21 RX ORDER — GLYCOPYRROLATE 0.2 MG/ML
INJECTION, SOLUTION INTRAMUSCULAR; INTRAVENOUS PRN
Status: DISCONTINUED | OUTPATIENT
Start: 2019-08-21 | End: 2019-08-21

## 2019-08-21 RX ORDER — DEXAMETHASONE SODIUM PHOSPHATE 4 MG/ML
INJECTION, SOLUTION INTRA-ARTICULAR; INTRALESIONAL; INTRAMUSCULAR; INTRAVENOUS; SOFT TISSUE PRN
Status: DISCONTINUED | OUTPATIENT
Start: 2019-08-21 | End: 2019-08-21

## 2019-08-21 RX ORDER — BUPIVACAINE HYDROCHLORIDE 2.5 MG/ML
INJECTION, SOLUTION EPIDURAL; INFILTRATION; INTRACAUDAL
Status: DISCONTINUED
Start: 2019-08-21 | End: 2019-08-21 | Stop reason: HOSPADM

## 2019-08-21 RX ORDER — ONDANSETRON 2 MG/ML
4 INJECTION INTRAMUSCULAR; INTRAVENOUS EVERY 30 MIN PRN
Status: DISCONTINUED | OUTPATIENT
Start: 2019-08-21 | End: 2019-08-21 | Stop reason: HOSPADM

## 2019-08-21 RX ORDER — ONDANSETRON 2 MG/ML
INJECTION INTRAMUSCULAR; INTRAVENOUS PRN
Status: DISCONTINUED | OUTPATIENT
Start: 2019-08-21 | End: 2019-08-21

## 2019-08-21 RX ORDER — HYDROCODONE BITARTRATE AND ACETAMINOPHEN 5; 325 MG/1; MG/1
1 TABLET ORAL ONCE
Status: COMPLETED | OUTPATIENT
Start: 2019-08-21 | End: 2019-08-21

## 2019-08-21 RX ORDER — NEOSTIGMINE METHYLSULFATE 1 MG/ML
VIAL (ML) INJECTION PRN
Status: DISCONTINUED | OUTPATIENT
Start: 2019-08-21 | End: 2019-08-21

## 2019-08-21 RX ORDER — PROPOFOL 10 MG/ML
INJECTION, EMULSION INTRAVENOUS CONTINUOUS PRN
Status: DISCONTINUED | OUTPATIENT
Start: 2019-08-21 | End: 2019-08-21

## 2019-08-21 RX ORDER — SODIUM CHLORIDE, SODIUM LACTATE, POTASSIUM CHLORIDE, CALCIUM CHLORIDE 600; 310; 30; 20 MG/100ML; MG/100ML; MG/100ML; MG/100ML
INJECTION, SOLUTION INTRAVENOUS CONTINUOUS
Status: DISCONTINUED | OUTPATIENT
Start: 2019-08-21 | End: 2019-08-21 | Stop reason: HOSPADM

## 2019-08-21 RX ORDER — FENTANYL CITRATE 50 UG/ML
25-50 INJECTION, SOLUTION INTRAMUSCULAR; INTRAVENOUS
Status: DISCONTINUED | OUTPATIENT
Start: 2019-08-21 | End: 2019-08-21 | Stop reason: HOSPADM

## 2019-08-21 RX ORDER — NALOXONE HYDROCHLORIDE 0.4 MG/ML
.1-.4 INJECTION, SOLUTION INTRAMUSCULAR; INTRAVENOUS; SUBCUTANEOUS
Status: DISCONTINUED | OUTPATIENT
Start: 2019-08-21 | End: 2019-08-21 | Stop reason: HOSPADM

## 2019-08-21 RX ORDER — CLINDAMYCIN PHOSPHATE 900 MG/50ML
900 INJECTION, SOLUTION INTRAVENOUS
Status: COMPLETED | OUTPATIENT
Start: 2019-08-21 | End: 2019-08-21

## 2019-08-21 RX ORDER — FENTANYL CITRATE 50 UG/ML
INJECTION, SOLUTION INTRAMUSCULAR; INTRAVENOUS PRN
Status: DISCONTINUED | OUTPATIENT
Start: 2019-08-21 | End: 2019-08-21

## 2019-08-21 RX ORDER — SODIUM CHLORIDE, SODIUM LACTATE, POTASSIUM CHLORIDE, CALCIUM CHLORIDE 600; 310; 30; 20 MG/100ML; MG/100ML; MG/100ML; MG/100ML
INJECTION, SOLUTION INTRAVENOUS CONTINUOUS PRN
Status: DISCONTINUED | OUTPATIENT
Start: 2019-08-21 | End: 2019-08-21

## 2019-08-21 RX ORDER — CLINDAMYCIN PHOSPHATE 900 MG/50ML
900 INJECTION, SOLUTION INTRAVENOUS SEE ADMIN INSTRUCTIONS
Status: DISCONTINUED | OUTPATIENT
Start: 2019-08-21 | End: 2019-08-21 | Stop reason: HOSPADM

## 2019-08-21 RX ORDER — ONDANSETRON 2 MG/ML
4 INJECTION INTRAMUSCULAR; INTRAVENOUS ONCE
Status: COMPLETED | OUTPATIENT
Start: 2019-08-21 | End: 2019-08-21

## 2019-08-21 RX ORDER — HYDROCODONE BITARTRATE AND ACETAMINOPHEN 5; 325 MG/1; MG/1
1-2 TABLET ORAL EVERY 4 HOURS PRN
Qty: 15 TABLET | Refills: 0 | Status: SHIPPED | OUTPATIENT
Start: 2019-08-21 | End: 2021-01-14

## 2019-08-21 RX ORDER — KETOROLAC TROMETHAMINE 30 MG/ML
INJECTION, SOLUTION INTRAMUSCULAR; INTRAVENOUS PRN
Status: DISCONTINUED | OUTPATIENT
Start: 2019-08-21 | End: 2019-08-21

## 2019-08-21 RX ORDER — MEPERIDINE HYDROCHLORIDE 25 MG/ML
12.5 INJECTION INTRAMUSCULAR; INTRAVENOUS; SUBCUTANEOUS
Status: DISCONTINUED | OUTPATIENT
Start: 2019-08-21 | End: 2019-08-21 | Stop reason: HOSPADM

## 2019-08-21 RX ORDER — LIDOCAINE HYDROCHLORIDE 10 MG/ML
INJECTION, SOLUTION EPIDURAL; INFILTRATION; INTRACAUDAL; PERINEURAL
Status: DISCONTINUED
Start: 2019-08-21 | End: 2019-08-21 | Stop reason: HOSPADM

## 2019-08-21 RX ADMIN — DEXMEDETOMIDINE HYDROCHLORIDE 12 MCG: 100 INJECTION, SOLUTION INTRAVENOUS at 08:05

## 2019-08-21 RX ADMIN — HYDROMORPHONE HYDROCHLORIDE 0.5 MG: 1 INJECTION, SOLUTION INTRAMUSCULAR; INTRAVENOUS; SUBCUTANEOUS at 09:32

## 2019-08-21 RX ADMIN — FENTANYL CITRATE 50 MCG: 50 INJECTION, SOLUTION INTRAMUSCULAR; INTRAVENOUS at 07:38

## 2019-08-21 RX ADMIN — NEOSTIGMINE METHYLSULFATE 3 MG: 1 INJECTION, SOLUTION INTRAVENOUS at 08:32

## 2019-08-21 RX ADMIN — HYDROMORPHONE HYDROCHLORIDE 1 MG: 1 INJECTION, SOLUTION INTRAMUSCULAR; INTRAVENOUS; SUBCUTANEOUS at 08:00

## 2019-08-21 RX ADMIN — HYDROCODONE BITARTRATE AND ACETAMINOPHEN 1 TABLET: 5; 325 TABLET ORAL at 10:18

## 2019-08-21 RX ADMIN — FENTANYL CITRATE 50 MCG: 50 INJECTION, SOLUTION INTRAMUSCULAR; INTRAVENOUS at 09:07

## 2019-08-21 RX ADMIN — FENTANYL CITRATE 50 MCG: 50 INJECTION, SOLUTION INTRAMUSCULAR; INTRAVENOUS at 07:40

## 2019-08-21 RX ADMIN — PROPOFOL 175 MCG/KG/MIN: 10 INJECTION, EMULSION INTRAVENOUS at 07:47

## 2019-08-21 RX ADMIN — ROCURONIUM BROMIDE 50 MG: 10 INJECTION INTRAVENOUS at 07:40

## 2019-08-21 RX ADMIN — FENTANYL CITRATE 50 MCG: 50 INJECTION, SOLUTION INTRAMUSCULAR; INTRAVENOUS at 09:13

## 2019-08-21 RX ADMIN — GLYCOPYRROLATE 0.6 MG: 0.2 INJECTION, SOLUTION INTRAMUSCULAR; INTRAVENOUS at 08:32

## 2019-08-21 RX ADMIN — MIDAZOLAM 2 MG: 1 INJECTION INTRAMUSCULAR; INTRAVENOUS at 07:36

## 2019-08-21 RX ADMIN — ONDANSETRON 4 MG: 2 INJECTION INTRAMUSCULAR; INTRAVENOUS at 08:27

## 2019-08-21 RX ADMIN — ONDANSETRON 4 MG: 2 INJECTION INTRAMUSCULAR; INTRAVENOUS at 11:09

## 2019-08-21 RX ADMIN — KETOROLAC TROMETHAMINE 15 MG: 30 INJECTION, SOLUTION INTRAMUSCULAR at 08:45

## 2019-08-21 RX ADMIN — SODIUM CHLORIDE, POTASSIUM CHLORIDE, SODIUM LACTATE AND CALCIUM CHLORIDE: 600; 310; 30; 20 INJECTION, SOLUTION INTRAVENOUS at 07:35

## 2019-08-21 RX ADMIN — DEXMEDETOMIDINE HYDROCHLORIDE 8 MCG: 100 INJECTION, SOLUTION INTRAVENOUS at 08:08

## 2019-08-21 RX ADMIN — DEXAMETHASONE SODIUM PHOSPHATE 4 MG: 4 INJECTION, SOLUTION INTRA-ARTICULAR; INTRALESIONAL; INTRAMUSCULAR; INTRAVENOUS; SOFT TISSUE at 07:46

## 2019-08-21 RX ADMIN — CLINDAMYCIN PHOSPHATE 900 MG: 18 INJECTION, SOLUTION INTRAVENOUS at 07:48

## 2019-08-21 ASSESSMENT — LIFESTYLE VARIABLES: TOBACCO_USE: 0

## 2019-08-21 ASSESSMENT — ENCOUNTER SYMPTOMS: SEIZURES: 0

## 2019-08-21 ASSESSMENT — MIFFLIN-ST. JEOR: SCORE: 1521.26

## 2019-08-21 NOTE — ANESTHESIA POSTPROCEDURE EVALUATION
Patient: Janice Morgan    Procedure(s):  LAPAROSCOPIC CHOLECYSTECTOMY    Diagnosis:GALLBLADDER POLYPS  Diagnosis Additional Information: No value filed.    Anesthesia Type:  General, ETT    Note:  Anesthesia Post Evaluation    Patient location during evaluation: PACU  Patient participation: Able to fully participate in evaluation  Level of consciousness: awake and alert  Pain management: satisfactory to patient  Airway patency: patent  Cardiovascular status: hemodynamically stable  Respiratory status: acceptable and unassisted  Hydration status: balanced  PONV: none     Anesthetic complications: None          Last vitals:  Vitals:    08/21/19 1000 08/21/19 1015 08/21/19 1104   BP: 105/65 111/76 120/75   Pulse: 56 76    Resp: 10 15 12   Temp:      SpO2: 99% 97% 99%         Electronically Signed By: Michael Vasquez MD  August 21, 2019  1:22 PM

## 2019-08-21 NOTE — DISCHARGE INSTRUCTIONS
[facility]  Discharge Instructions: Post-Operative Laparoscopic Cholecystectomy    ACTIVITY    Expect to feel tired after your surgery.  This will gradually resolve.      Take frequent, short walks and increase your activity gradually.      Avoid strenuous physical activity or heavy lifting greater than 15-20 lbs. for 2-3 weeks.  You may climb stairs.    You may drive without restrictions when you are not using any prescription pain medication and feel comfortable in a car.    You may return to work/school when you are comfortable without any prescription pain medication.    WOUND CARE    You may remove your outer dressing or Band-Aids and shower 48 hours after the surgery.  Pat your incisions dry and leave them open to air.  Re-apply dressing (Band-Aids or gauze/tape) as needed for comfort or drainage.    You may have steri-strips (looks like white tape) on your incision.  You may peel off the steri-strips 2 weeks after your surgery if they have not peeled off on their own.     Do not soak your incisions in a tub or pool for 2 weeks.     Do not apply any lotions, creams, or ointments to your incisions.    A ridge under your incisions is normal and will gradually resolve.    DIET    Start with liquids, then gradually resume your regular diet as tolerated.  Avoid heavy, spicy, and greasy meals for 2-3 days.    Drink plenty of fluids to stay hydrated.    It is not uncommon to experience some loose stools or diarrhea after surgery.  This is your body s way of adapting to the bile which will slowly drain into your intestine.  A low fat diet may help with this.  This should improve over 1-2 months.    PAIN    Expect some tenderness and discomfort at the incision sites.  Use the prescribed pain medication at your discretion.  Expect gradual resolution of your pain over several days.    You may take ibuprofen with food (unless you have been told not to) instead of or in addition to  your prescribed pain medication.  If you are taking Norco or Percocet, do not take any additional acetaminophen/APAP/Tylenol.    Do not drink alcohol or drive while you are taking pain medications.    You may apply ice to your incisions in 20 minute intervals as needed for the next 48 hours.  After that time, consider switching to heat if you prefer.    EXPECTATIONS    Pain medications can cause constipation.  Limit use when possible.  Take over the counter stool softener/stimulant, such as Colace or Senna, 1-2 times a day with plenty of water.  You may take a mild over the counter laxative, such as Miralax or a suppository, as needed.  You may discontinue these medications once you are having regular bowel movements and/or are no longer taking your narcotic pain medication.      You may have shoulder or upper back discomfort due to the gas used in surgery.  This is temporary and should resolve in 48-72 hours.  Short, frequent walks may help with this.    FOLLOW UP    Dr. Taylor will contact you regarding the pathology results.    Otherwise, our office will contact you approximately 2 weeks to check on your progress and answer any questions you may have.  If you are doing well, you will not need to return for a follow up appointment.  If any concerns are identified over the phone, we will help you make an appointment to see a provider.     If you have not received a phone call, have any questions or concerns, or would like to be seen, please call us at [phone] and ask to speak with our nurse.  We are located at [address] [place] [address].    CALL OUR OFFICE [phone] IF YOU HAVE:     Chills or fever above 101 F.    Increased redness, warmth, or drainage at your incisions.    Significant bleeding.    Pain not relieved by your pain medication or rest.    Increasing pain after the first 48 hours.    Any other concerns or questions.    Revised January 2018        Same Day Surgery  Discharge Instructions for  Sedation and General Anesthesia       It's not unusual to feel dizzy, light-headed or faint for up to 24 hours after surgery or while taking pain medication.  If you have these symptoms: sit for a few minutes before standing and have someone assist you when you get up to walk or use the bathroom.      You should rest and relax for the next 24 hours. We recommend you make arrangements to have an adult stay with you for at least 24 hours after your discharge.  Avoid hazardous and strenuous activity.      DO NOT DRIVE any vehicle or operate mechanical equipment for 24 hours following the end of your surgery.  Even though you may feel normal, your reactions may be affected by the medication you have received.      Do not drink alcoholic beverages for 24 hours following surgery.       Slowly progress to your regular diet as you feel able. It's not unusual to feel nauseated and/or vomit after receiving anesthesia.  If you develop these symptoms, drink clear liquids (apple juice, ginger ale, broth, 7-up, etc. ) until you feel better.  If your nausea and vomiting persists for 24 hours, please notify your surgeon.        All narcotic pain medications, along with inactivity and anesthesia, can cause constipation. Drinking plenty of liquids and increasing fiber intake will help.      For any questions of a medical nature, call your surgeon.      Do not make important decisions for 24 hours.      If you had general anesthesia, you may have a sore throat for a couple of days related to the breathing tube used during surgery.  You may use Cepacol lozenges to help with this discomfort.  If it worsens or if you develop a fever, contact your surgeon.       If you feel your pain is not well managed with the pain medications prescribed by your surgeon, please contact your surgeon's office to let them know so they can address your concerns.       Today you received Toradol, an antiinflammatory medication  similar to Ibuprofen.  You should not take other antiinflammatory medication, such as Ibuprofen, Motrin, Advil, Aleve, Naprosyn, etc until 2:45 pm.       **If you have concerns or questions about your procedure,    please contact Dr Taylor at  [phone]**

## 2019-08-21 NOTE — ANESTHESIA CARE TRANSFER NOTE
Patient: Janice Morgan    Procedure(s):  LAPAROSCOPIC CHOLECYSTECTOMY    Diagnosis: GALLBLADDER POLYPS  Diagnosis Additional Information: No value filed.    Anesthesia Type:   General, ETT     Note:  Airway :Face Mask  Patient transferred to:PACU  Comments: Suctioned, spont resp ,lifts head  > 5 sec. Extubated with immediate exchange, to PACU, report to RN.Handoff Report: Identifed the Patient, Identified the Reponsible Provider, Reviewed the pertinent medical history, Discussed the surgical course, Reviewed Intra-OP anesthesia mangement and issues during anesthesia, Set expectations for post-procedure period and Allowed opportunity for questions and acknowledgement of understanding      Vitals: (Last set prior to Anesthesia Care Transfer)    CRNA VITALS  8/21/2019 0823 - 8/21/2019 0858      8/21/2019             Pulse:  96    SpO2:  98 %    Resp Rate (set):  10                Electronically Signed By: HA Turner CRNA  August 21, 2019  8:58 AM

## 2019-08-21 NOTE — ANESTHESIA PREPROCEDURE EVALUATION
Anesthesia Pre-Procedure Evaluation    Patient: Janice Morgan   MRN: [id number] : 1970          Preoperative Diagnosis: GALLBLADDER POLYPS    Procedure(s):  LAPAROSCOPIC CHOLECYSTECTOMY    Past Medical History:   Diagnosis Date     Cervical high risk HPV (human papillomavirus) test positive 10/2014    NIL pap, + HPV (not 16 or 18) '16 HPV neg     Hashimoto's thyroiditis 10/8/2010     Past Surgical History:   Procedure Laterality Date     C ANALGESIA,EPIDURAL,LABOR &   , 2005     HC EXCISION BREAST LESION, OPEN >=1  2001     LAPAROSCOPIC APPENDECTOMY  2013    Procedure: LAPAROSCOPIC APPENDECTOMY;  Laparoscopic Appendectomy;  Surgeon: Mercy Leal MD;  Location: [facility] OR       Anesthesia Evaluation     . Pt has had prior anesthetic.     No history of anesthetic complications          ROS/MED HX    ENT/Pulmonary:      (-) tobacco use and sleep apnea   Neurologic:      (-) seizures   Cardiovascular:        (-) ROSARIO   METS/Exercise Tolerance:  >4 METS   Hematologic:         Musculoskeletal:         GI/Hepatic:        (-) GERD and liver disease   Renal/Genitourinary:      (-) renal disease   Endo:     (+) thyroid problem hypothyroidism, Obesity, .      Psychiatric:     (+) psychiatric history anxiety and depression      Infectious Disease:         Malignancy:         Other:                          Physical Exam  Normal systems: cardiovascular, pulmonary and dental    Airway   Mallampati: I  TM distance: >3 FB  Neck ROM: full    Dental     Cardiovascular       Pulmonary             Lab Results   Component Value Date    WBC 6.6 10/18/2018    HGB 14.4 10/18/2018    HCT 42.2 10/18/2018     10/18/2018    CRP 9.3 (H) 2013    SED 7 2010     10/18/2018    POTASSIUM 3.5 10/18/2018    CHLORIDE 105 10/18/2018    CO2 28 10/18/2018    BUN 16 10/18/2018    CR 0.81 10/18/2018    GLC 96 10/18/2018    HELEN 8.9 10/18/2018    ALBUMIN 4.2 2017    PROTTOTAL 7.7  "04/03/2017    ALT 28 04/03/2017    AST 12 04/03/2017    ALKPHOS 91 04/03/2017    BILITOTAL 0.5 04/03/2017    LIPASE 141 04/20/2013    PTT 31 04/20/2013    INR 1.04 04/20/2013    TSH 0.68 10/18/2018    T4 1.62 (H) 01/12/2016    HCG Negative 04/11/2013    HCGS Negative 05/16/2012       Preop Vitals  BP Readings from Last 3 Encounters:   08/21/19 (!) 143/92   08/13/19 120/85   07/19/19 120/78    Pulse Readings from Last 3 Encounters:   08/13/19 109   07/19/19 92   07/18/19 105      Resp Readings from Last 3 Encounters:   08/21/19 20   06/12/19 16   10/18/18 16    SpO2 Readings from Last 3 Encounters:   08/21/19 97%   08/13/19 96%   07/18/19 96%      Temp Readings from Last 1 Encounters:   08/21/19 36.9  C (98.4  F)    Ht Readings from Last 1 Encounters:   08/21/19 1.702 m (5' 7\")      Wt Readings from Last 1 Encounters:   08/21/19 87.4 kg (192 lb 9.6 oz)    Estimated body mass index is 30.17 kg/m  as calculated from the following:    Height as of this encounter: 1.702 m (5' 7\").    Weight as of this encounter: 87.4 kg (192 lb 9.6 oz).       Anesthesia Plan      History & Physical Review  History and physical reviewed and following examination; no interval change.    ASA Status:  2 .    NPO Status:  > 8 hours    Plan for General and ETT with Intravenous induction. Maintenance will be Balanced.    PONV prophylaxis:  Ondansetron (or other 5HT-3) and Dexamethasone or Solumedrol       Postoperative Care  Postoperative pain management:  Multi-modal analgesia.      Consents  Anesthetic plan, risks, benefits and alternatives discussed with:  Spouse and Patient..                 Michael Vasquez MD  "

## 2019-08-21 NOTE — BRIEF OP NOTE
[facility] Brief Operative Note    Pre-operative diagnosis: GALLBLADDER POLYPS   Post-operative diagnosis s/p lap moisés for gallbladder polyps    Procedure: Procedure(s):  LAPAROSCOPIC CHOLECYSTECTOMY   Surgeon(s): Surgeon(s) and Role:     * Marlene Taylor MD - Primary   Estimated blood loss: * No values recorded between 8/21/2019  7:57 AM and 8/21/2019  8:48 AM *    Specimens: ID Type Source Tests Collected by Time Destination   A :  Tissue Gallbladder and Contents SURGICAL PATHOLOGY EXAM Marlene Taylor MD 8/21/2019  8:24 AM       Findings: Uncomplicated lap moisés

## 2019-08-21 NOTE — OP NOTE
General Surgery Operative Note    PREOPERATIVE DIAGNOSIS:  Gallbladder polyps    POSTOPERATIVE DIAGNOSIS:  Same, evidence of chronic cholecystitis    PROCEDURE:  Laparoscopic Cholecystectomy    SURGEON:  Marlene Taylor MD    ASSISTANT:  Mery Neely MD [facility] [place] Resident  The physician s assistant was medically necessary for their expertise in camera management, suctioning and retraction.  ANESTHESIA:  General.    BLOOD LOSS: 10ml    FINDINGS: omental adhesions to undersurface of gallbladder consistent with chronic cholecystitis.     INDICATIONS:   Ms. Morgan is a 48 yo female who presented with gallbladder polyps. She had a CT abdomen/pelvis, abdominal US and abdominal MRI in June of this year. CT revealed an enlarging gallbladder polyp. 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 I explained the risks, benefits, complications for laparoscopic cholecystectomy including but not limited to bleeding, infection, possible need to open, possible postop hematoma, seroma, bowel, bladder or bile duct injury, MI, PE, and if any of these occurred the patient would require additional procedures. The patient agreed and did sign consent.    DETAILS OF PROCEDURE:   The patient was brought to the operating room. They was positioned on the operating room table with the left arm tucked at her side. General anesthesia was induced. Perioperative antibiotics were administered. The abdomen was prepped and draped in standard fashion.    A small skin incision was made in the left upper quadrant. A 5mm 0degree laparoscope was used with a visiport to obtain access to the abdomen. Insufflation was connected and flowed freely.  Insufflation pressure was sent to 15mmhg. The abdomen was surveyed and no acute findings were seen. There was no injury from abdominal entrance noted. A 12mm port was placed in the midline just beneath the umbilicus. A 10mm laparoscope was inserted and revealed no trocar injuries. Local was injected to the upper abdomen and two 5mm ports were placed in the right upper quadrant under direct visualization. The gallbladder was retracted superiorly and laterally. The gallbladder was not inflamed but there were significant omental adhesions to the undersurface of the gallbladder. These were carefully taken down using hook cautery. Cautery was then used to take down the medial and lateral peritoneal attachments. I was able to delineate the artery and duct and got under the undersurface of the gallbladder to help further declinate the duct and artery. This dissection was taken up high along the gallbladder to confirm the critical view on multiple views. Two clips were placed proximally on the cystic duct and one distally. Two clips were placed proximally on the artery and one clip distally. Both the cystic artery and cystic duct were then completely transected with laparoscopic scissors.     The gallbladder was taken off the liver bed with cautery. There was no bile spillage or significant bleeding. The gallbladder was then placed in an Endocatch bag and removed through the umbilical trocar site. The camera was reinserted which showed no bleeding or bile spillage. Copious irrigation was used until clear. The wound bed was inspected multiple times showing that it was completely dry and that the clips were in place. The omentum was packed into the perihepatic fossa. The 12mm port fascia closed with 0 Vicryl in figure-of-eight fashion using the mark catalina device. All gas was expelled and the trocars were taken out under direct visualization. Marcaine 0.5% were injected to all the wounds. The skin was closed with a 4-0  Monocryl in a subcuticular fashion. Steri-strips and sterile dressings were applied. The patient tolerated the procedure well. There were no complications. They were awoken from anesthesia and transferred to PACU in stable condition. All sponge, instrument and needle counts were correct.       SHALOM ONEILL MD    Please route or send letter to:  Primary Care Provider (PCP) and Referring Provider

## 2019-08-27 LAB — COPATH REPORT: NORMAL

## 2019-09-10 ENCOUNTER — OFFICE VISIT (OUTPATIENT)
Dept: SURGERY | Facility: CLINIC | Age: 49
End: 2019-09-10
Payer: COMMERCIAL

## 2019-09-10 DIAGNOSIS — Z09 FOLLOW-UP EXAMINATION FOLLOWING SURGERY: Primary | ICD-10-CM

## 2019-09-10 PROCEDURE — 99024 POSTOP FOLLOW-UP VISIT: CPT | Performed by: SURGERY

## 2019-09-10 NOTE — PROGRESS NOTES
Surgery Postoperative Note    S: Janice is doing well after surgery. No pain at incisions. She does notice some loose stools related to certain foods, this is improving.     Abdomen: incisions healing well, no erythema or induration     Pathology:   FINAL DIAGNOSIS:   Gallbladder, laparoscopic cholecystectomy   - Cholesterol polyps   - Cholesterolosis   - No evidence of neoplastic polyp or malignancy   - One benign lymph node    A/P  Janice Morgan is recovering from a Laparoscopic Cholecystectomy on 8/21/2019 due to presence of gallbladder polyps. Her final pathology was benign. I advised her to slowly return to regular activity. I expect she will make a complete recovery without issues.     Thank you for the opportunity to help in her care.    Marlene Taylor MD  [facility]  [phone]    Please route or send letter to:  Primary Care Provider (PCP) and Referring Provider

## 2019-11-08 ENCOUNTER — HEALTH MAINTENANCE LETTER (OUTPATIENT)
Age: 49
End: 2019-11-08

## 2019-12-20 ENCOUNTER — ANCILLARY PROCEDURE (OUTPATIENT)
Dept: MRI IMAGING | Facility: CLINIC | Age: 49
End: 2019-12-20
Attending: INTERNAL MEDICINE
Payer: COMMERCIAL

## 2019-12-20 DIAGNOSIS — R16.0 LIVER MASS: ICD-10-CM

## 2019-12-26 NOTE — RESULT ENCOUNTER NOTE
Winifred,      Wanted to make sure you have a copy of your most recent imaging for your records.  Based on the results of this study, we are not making any new changes to your clinical plan.     First off - I hope you are doing well after the gallbladder was removed!  I wanted to review this MRI with you.  Nothing concerning.  The areas that was seen on a previous imaging study and then now is consistent with a process called focal nodular hyperplasia.  This is a benign (non-cancerous) process that has NO chance of becoming a cancer.  It is essentially a fancy type of scar within the liver that typically occurs in women and in people who have had an auto-immune disease (like the Hashimoto's).   I think I would recommend a repeat MRI in 1 year to ensure that it hasn't grown or changed in any particular way, but if you would like to stop the imaging studies,  I would agree with that too.      Please let us know your thoughts.    It has been a pleasure to participate in your care.  Please call our clinic if you have any questions or concerns.    Thomas M. Leventhal, M.D.   of Medicine  Advanced & Transplant Hepatology  [facility]

## 2020-02-23 ENCOUNTER — HEALTH MAINTENANCE LETTER (OUTPATIENT)
Age: 50
End: 2020-02-23

## 2020-12-06 ENCOUNTER — HEALTH MAINTENANCE LETTER (OUTPATIENT)
Age: 50
End: 2020-12-06

## 2021-01-13 ENCOUNTER — OFFICE VISIT (OUTPATIENT)
Dept: FAMILY MEDICINE | Facility: CLINIC | Age: 51
End: 2021-01-13
Payer: COMMERCIAL

## 2021-01-13 ENCOUNTER — ANCILLARY PROCEDURE (OUTPATIENT)
Dept: GENERAL RADIOLOGY | Facility: CLINIC | Age: 51
End: 2021-01-13
Attending: FAMILY MEDICINE
Payer: COMMERCIAL

## 2021-01-13 VITALS
WEIGHT: 197 LBS | HEIGHT: 66 IN | OXYGEN SATURATION: 98 % | BODY MASS INDEX: 31.66 KG/M2 | TEMPERATURE: 97.9 F | DIASTOLIC BLOOD PRESSURE: 80 MMHG | RESPIRATION RATE: 16 BRPM | HEART RATE: 85 BPM | SYSTOLIC BLOOD PRESSURE: 120 MMHG

## 2021-01-13 DIAGNOSIS — M25.532 LEFT WRIST PAIN: ICD-10-CM

## 2021-01-13 DIAGNOSIS — M54.42 ACUTE BILATERAL LOW BACK PAIN WITH LEFT-SIDED SCIATICA: ICD-10-CM

## 2021-01-13 DIAGNOSIS — Z00.00 ROUTINE GENERAL MEDICAL EXAMINATION AT A HEALTH CARE FACILITY: Primary | ICD-10-CM

## 2021-01-13 DIAGNOSIS — F33.1 MAJOR DEPRESSIVE DISORDER, RECURRENT EPISODE, MODERATE (H): ICD-10-CM

## 2021-01-13 DIAGNOSIS — D22.9 ATYPICAL MOLE: ICD-10-CM

## 2021-01-13 LAB
CHOLEST SERPL-MCNC: 184 MG/DL
HBA1C MFR BLD: 5.3 % (ref 0–5.6)
HDLC SERPL-MCNC: 53 MG/DL
LDLC SERPL CALC-MCNC: 102 MG/DL
NONHDLC SERPL-MCNC: 127 MG/DL
TRIGL SERPL-MCNC: 124 MG/DL

## 2021-01-13 PROCEDURE — 36415 COLL VENOUS BLD VENIPUNCTURE: CPT | Performed by: FAMILY MEDICINE

## 2021-01-13 PROCEDURE — 90471 IMMUNIZATION ADMIN: CPT | Performed by: FAMILY MEDICINE

## 2021-01-13 PROCEDURE — 83036 HEMOGLOBIN GLYCOSYLATED A1C: CPT | Performed by: FAMILY MEDICINE

## 2021-01-13 PROCEDURE — 99396 PREV VISIT EST AGE 40-64: CPT | Mod: 25 | Performed by: FAMILY MEDICINE

## 2021-01-13 PROCEDURE — 90682 RIV4 VACC RECOMBINANT DNA IM: CPT | Performed by: FAMILY MEDICINE

## 2021-01-13 PROCEDURE — 99213 OFFICE O/P EST LOW 20 MIN: CPT | Mod: 25 | Performed by: FAMILY MEDICINE

## 2021-01-13 PROCEDURE — 73110 X-RAY EXAM OF WRIST: CPT | Mod: LT | Performed by: RADIOLOGY

## 2021-01-13 PROCEDURE — 80061 LIPID PANEL: CPT | Performed by: FAMILY MEDICINE

## 2021-01-13 RX ORDER — LEVOTHYROXINE SODIUM 175 UG/1
TABLET ORAL
COMMUNITY
Start: 2020-12-01

## 2021-01-13 ASSESSMENT — PATIENT HEALTH QUESTIONNAIRE - PHQ9
SUM OF ALL RESPONSES TO PHQ QUESTIONS 1-9: 11
5. POOR APPETITE OR OVEREATING: MORE THAN HALF THE DAYS

## 2021-01-13 ASSESSMENT — ANXIETY QUESTIONNAIRES
5. BEING SO RESTLESS THAT IT IS HARD TO SIT STILL: NOT AT ALL
GAD7 TOTAL SCORE: 7
6. BECOMING EASILY ANNOYED OR IRRITABLE: MORE THAN HALF THE DAYS
2. NOT BEING ABLE TO STOP OR CONTROL WORRYING: NOT AT ALL
3. WORRYING TOO MUCH ABOUT DIFFERENT THINGS: NOT AT ALL
IF YOU CHECKED OFF ANY PROBLEMS ON THIS QUESTIONNAIRE, HOW DIFFICULT HAVE THESE PROBLEMS MADE IT FOR YOU TO DO YOUR WORK, TAKE CARE OF THINGS AT HOME, OR GET ALONG WITH OTHER PEOPLE: SOMEWHAT DIFFICULT
7. FEELING AFRAID AS IF SOMETHING AWFUL MIGHT HAPPEN: SEVERAL DAYS
1. FEELING NERVOUS, ANXIOUS, OR ON EDGE: MORE THAN HALF THE DAYS

## 2021-01-13 ASSESSMENT — MIFFLIN-ST. JEOR: SCORE: 1512.4

## 2021-01-13 NOTE — PROGRESS NOTES
SUBJECTIVE:   CC: Janice Morgan is an 50 year old woman who presents for preventive health visit.       Patient has been advised of split billing requirements and indicates understanding: Yes  Healthy Habits:  Answers for HPI/ROS submitted by the patient on 1/12/2021   Annual Exam:  Frequency of exercise:: 1 day/week  Getting at least 3 servings of Calcium per day:: Yes  Diet:: Regular (no restrictions)  Taking medications regularly:: Yes  Medication side effects:: Not applicable  Bi-annual eye exam:: Yes  Dental care twice a year:: Yes  Sleep apnea or symptoms of sleep apnea:: Daytime drowsiness  Additional concerns today:: Yes  Duration of exercise:: Less than 15 minutes      PROBLEMS TO ADD ON...  1) left wrist pain, no injury but they moved recently so may be overuse , hx of surgery on that wrist when it broke and shattered in a few places a few yrs ago and she has plates placed then, still with the hardware in   2) low back pian , some in the past but recently got worse , some radiation in the left buttocks , felt numbness in the middle toe in the left foot , no weakness, no locking , she has seen chiropractor , done ice alternating with heat pads , this has been going on since June   3) left lower quadrant pain end of December , hx of diverticulitis and she thinks eating popcorn triggered it , although she was constipated as well , ended up with pain and then sensation of left perineal / vaginal wall swelling which was causing pain when going to the bathroom , she denies any urinary symptoms , no incontinence , no vag discharge   4) two moles in the labia and perineal area which are changing colors , concerning size and color for the pt     Today's PHQ-2 Score:   PHQ-2 ( 1999 [employer]) 1/12/2021 8/13/2019   Q1: Little interest or pleasure in doing things 1 0   Q2: Feeling down, depressed or hopeless 1 0   PHQ-2 Score 2 0   Q1: Little interest or pleasure in doing things Several days -   Q2: Feeling down,  depressed or hopeless Several days -   PHQ-2 Score 2 -       Abuse: Current or Past(Physical, Sexual or Emotional)- No  Do you feel safe in your environment? Yes        Social History     Tobacco Use     Smoking status: Former Smoker     Smokeless tobacco: Never Used     Tobacco comment: quit for 4 years.   Substance Use Topics     Alcohol use: Yes     Comment: 1-2 drinks per month     If you drink alcohol do you typically have >3 drinks per day or >7 drinks per week? No                     Reviewed orders with patient.  Reviewed health maintenance and updated orders accordingly - Yes  Labs reviewed in [facility]    Mammogram Screening: Patient over age 50, mutual decision to screen reflected in health maintenance.    Pertinent mammograms are reviewed under the imaging tab.  History of abnormal Pap smear: NO - age 30- 65 PAP every 3 years recommended  PAP / HPV Latest Ref Rng & Units 2018 2016 10/8/2014   PAP - NIL NIL NIL   HPV 16 DNA NEG:Negative Negative Negative -   HPV 18 DNA NEG:Negative Negative Negative -   OTHER HR HPV NEG:Negative Negative Negative -     Reviewed and updated as needed this visit by clinical staff  Tobacco  Allergies  Meds              Reviewed and updated as needed this visit by Provider                Past Medical History:   Diagnosis Date     Cervical high risk HPV (human papillomavirus) test positive 10/2014    NIL pap, + HPV (not 16 or 18) '16 HPV neg     Hashimoto's thyroiditis 10/8/2010      Past Surgical History:   Procedure Laterality Date     C ANALGESIA,EPIDURAL,LABOR &   , 2005     HC EXCISION BREAST LESION, OPEN >=1  2001     LAPAROSCOPIC APPENDECTOMY  2013    Procedure: LAPAROSCOPIC APPENDECTOMY;  Laparoscopic Appendectomy;  Surgeon: Mercy Leal MD;  Location:  OR     LAPAROSCOPIC CHOLECYSTECTOMY N/A 2019    Procedure: LAPAROSCOPIC CHOLECYSTECTOMY;  Surgeon: Marlene Taylor MD;  Location:  OR  "      ROS:  CONSTITUTIONAL: NEGATIVE for fever, chills, change in weight  INTEGUMENTARU/SKIN: NEGATIVE for worrisome rashes, moles or lesions  EYES: NEGATIVE for vision changes or irritation  ENT: NEGATIVE for ear, mouth and throat problems  RESP: NEGATIVE for significant cough or SOB  BREAST: NEGATIVE for masses, tenderness or discharge  CV: NEGATIVE for chest pain, palpitations or peripheral edema  GI: NEGATIVE for nausea, abdominal pain, heartburn, or change in bowel habits  : NEGATIVE for unusual urinary or vaginal symptoms. Periods are regular.  MUSCULOSKELETAL: NEGATIVE for significant arthralgias or myalgia  NEURO: NEGATIVE for weakness, dizziness or paresthesias  PSYCHIATRIC: NEGATIVE for changes in mood or affect    OBJECTIVE:   BP (!) 147/94 (BP Location: Left arm, Cuff Size: Adult Regular)   Pulse 85   Temp 97.9  F (36.6  C) (Tympanic)   Resp 16   Ht 1.664 m (5' 5.5\")   Wt 89.4 kg (197 lb)   LMP 08/12/2016   SpO2 98%   BMI 32.28 kg/m    EXAM:  GENERAL: healthy, alert and no distress  EYES: Eyes grossly normal to inspection, PERRL and conjunctivae and sclerae normal  HENT: ear canals and TM's normal, nose and mouth without ulcers or lesions  NECK: no adenopathy, no asymmetry, masses, or scars and thyroid normal to palpation  RESP: lungs clear to auscultation - no rales, rhonchi or wheezes  BREAST: normal without masses, tenderness or nipple discharge and no palpable axillary masses or adenopathy  CV: regular rate and rhythm, normal S1 S2, no S3 or S4, no murmur, click or rub, no peripheral edema and peripheral pulses strong  ABDOMEN: soft, nontender, no hepatosplenomegaly, no masses and bowel sounds normal   (female): normal female external genitalia, normal urethral meatus, vaginal mucosa pink, moist, well rugated, and normal cervix/adnexa/uterus without masses or discharge  MS: no gross musculoskeletal defects noted, no edema EXCEPT there some muscle spasms in the paraspinal muscles in the " lumbar spine area , straiglht leg is negative jareth, there is some tenderness in the left wrist and scar form prior surgery there is no edema no erythema and some pain with twisting motions   SKIN: no suspicious lesions or rashes, except two moles on the perineum, on the right side about 0.8 cm in size and with changes in colors   NEURO: Normal strength and tone, mentation intact and speech normal  PSYCH: mentation appears normal, affect normal/bright    Diagnostic Test Results:  Labs reviewed in Epic  Results for orders placed or performed in visit on 01/13/21   XR Wrist Left G/E 3 Views     Status: None    Narrative    XR WRIST LEFT G/E 3 VIEWS 1/13/2021 8:59 AM     HISTORY: Left wrist pain      Impression    IMPRESSION: Distal radial plate and screws. No evidence of acute  fracture. First carpometacarpal degenerative arthrosis.    JAIRO FELICIANO MD   Results for orders placed or performed in visit on 01/13/21   Lipid panel reflex to direct LDL Fasting     Status: Abnormal   Result Value Ref Range    Cholesterol 184 <200 mg/dL    Triglycerides 124 <150 mg/dL    HDL Cholesterol 53 >49 mg/dL    LDL Cholesterol Calculated 102 (H) <100 mg/dL    Non HDL Cholesterol 127 <130 mg/dL   Hemoglobin A1c     Status: None   Result Value Ref Range    Hemoglobin A1C 5.3 0 - 5.6 %       ASSESSMENT/PLAN:   1. Routine general medical examination at a health care facility  Discussed diet,calcium,exercise.Went over self breast exam.Thin prep was done.Eyes and teeth UTD.No immunizations needed today.See orders below for tests ordered and screening needed.    - C RIV4 (FLUBLOK) VACCINE RECOMBINANT DNA PRSRV ANTIBIO FREE, IM [[id number]]  - Lipid panel reflex to direct LDL Fasting  - Hemoglobin A1c  - GASTROENTEROLOGY ADULT REF PROCEDURE ONLY; Future  -2. Major depressive disorder, recurrent episode, moderate (H)  Currently is well controlled , pt is not on any antidepressants     3. Left wrist pain  No signs of fractures per my reading , there  "is a plate and screws in pace visible , we discussed since she has had a surgey before there and no recent pain , would refer to ortho and I have given her the referral   - XR Wrist Left G/E 3 Views; Future  - Orthopedic & Spine  Referral; Future    4. Acute bilateral low back pain with left-sided sciatica  Discussed ice , heat alternating , manjinder start PT , would need to follow up in one month sooner if any concerns   - RODNEY PT, HAND, AND CHIROPRACTIC REFERRAL; Future    5. Atypical appearing moles on the perineal area    DERMATOLOGY ADULT REFERRAL; Future        Patient has been advised of split billing requirements and indicates understanding: Yes  COUNSELING:   Reviewed preventive health counseling, as reflected in patient instructions       Regular exercise       Healthy diet/nutrition       Vision screening    Estimated body mass index is 32.28 kg/m  as calculated from the following:    Height as of this encounter: 1.664 m (5' 5.5\").    Weight as of this encounter: 89.4 kg (197 lb).        She reports that she has quit smoking. She has never used smokeless tobacco.      Counseling Resources:  ATP IV Guidelines  Pooled Cohorts Equation Calculator  Breast Cancer Risk Calculator  BRCA-Related Cancer Risk Assessment: FHS-7 Tool  FRAX Risk Assessment  ICSI Preventive Guidelines  Dietary Guidelines for Americans, 2010  USDA's MyPlate  ASA Prophylaxis  Lung CA Screening    Yaneth Scott MD  [facility]  "

## 2021-01-13 NOTE — PATIENT INSTRUCTIONS
Preventive Health Recommendations  Female Ages 50 - 64    Yearly exam: See your health care provider every year in order to  o Review health changes.   o Discuss preventive care.    o Review your medicines if your doctor has prescribed any.      Get a Pap test every three years (unless you have an abnormal result and your provider advises testing more often).    If you get Pap tests with HPV test, you only need to test every 5 years, unless you have an abnormal result.     You do not need a Pap test if your uterus was removed (hysterectomy) and you have not had cancer.    You should be tested each year for STDs (sexually transmitted diseases) if you're at risk.     Have a mammogram every 1 to 2 years.    Have a colonoscopy at age 50, or have a yearly FIT test (stool test). These exams screen for colon cancer.      Have a cholesterol test every 5 years, or more often if advised.    Have a diabetes test (fasting glucose) every three years. If you are at risk for diabetes, you should have this test more often.     If you are at risk for osteoporosis (brittle bone disease), think about having a bone density scan (DEXA).    Shots: Get a flu shot each year. Get a tetanus shot every 10 years.    Nutrition:     Eat at least 5 servings of fruits and vegetables each day.    Eat whole-grain bread, whole-wheat pasta and brown rice instead of white grains and rice.    Get adequate Calcium and Vitamin D.     Lifestyle    Exercise at least 150 minutes a week (30 minutes a day, 5 days a week). This will help you control your weight and prevent disease.    Limit alcohol to one drink per day.    No smoking.     Wear sunscreen to prevent skin cancer.     See your dentist every six months for an exam and cleaning.    See your eye doctor every 1 to 2 years.  PLEASE CALL TO SCHEDULE YOUR MAMMOGRAM  [facility] [phone]  [facility] [phone]  [facility]   [phone]  [place]  Scheduling (all locations) [phone]    If you are under/uninsured, we recommend you contact the [facility]. They offer mammograms on a sliding fee charge. You can schedule with them at [phone].    Please set up your colonoscopy with Dr.Paul Amador at [facility] [phone], central scheduling [phone], or [facility] [phone]

## 2021-01-13 NOTE — NURSING NOTE
"Chief Complaint   Patient presents with     Physical     initial BP (!) 147/94 (BP Location: Left arm, Cuff Size: Adult Regular)   Pulse 85   Temp 97.9  F (36.6  C) (Tympanic)   Resp 16   Ht 1.664 m (5' 5.5\")   Wt 89.4 kg (197 lb)   LMP 08/12/2016   SpO2 98%   BMI 32.28 kg/m   Estimated body mass index is 32.28 kg/m  as calculated from the following:    Height as of this encounter: 1.664 m (5' 5.5\").    Weight as of this encounter: 89.4 kg (197 lb).  BP completed using cuff size: large.  L  arm      Health Maintenance that is potentially due pending provider review:  NONE    PAP--Possibly completing today, per Provider review    Yaya Buitrago ma  "

## 2021-01-14 ASSESSMENT — ANXIETY QUESTIONNAIRES: GAD7 TOTAL SCORE: 7

## 2021-01-15 ENCOUNTER — TRANSFERRED RECORDS (OUTPATIENT)
Dept: HEALTH INFORMATION MANAGEMENT | Facility: CLINIC | Age: 51
End: 2021-01-15

## 2021-01-15 ENCOUNTER — MYC MEDICAL ADVICE (OUTPATIENT)
Dept: FAMILY MEDICINE | Facility: CLINIC | Age: 51
End: 2021-01-15

## 2021-01-15 DIAGNOSIS — N81.10 VAGINAL PROLAPSE: Primary | ICD-10-CM

## 2021-01-17 DIAGNOSIS — Z11.59 ENCOUNTER FOR SCREENING FOR OTHER VIRAL DISEASES: Primary | ICD-10-CM

## 2021-01-22 ENCOUNTER — THERAPY VISIT (OUTPATIENT)
Dept: PHYSICAL THERAPY | Facility: CLINIC | Age: 51
End: 2021-01-22
Payer: COMMERCIAL

## 2021-01-22 DIAGNOSIS — M54.50 LEFT LOW BACK PAIN: ICD-10-CM

## 2021-01-22 DIAGNOSIS — M54.42 ACUTE BILATERAL LOW BACK PAIN WITH LEFT-SIDED SCIATICA: ICD-10-CM

## 2021-01-22 PROCEDURE — 97161 PT EVAL LOW COMPLEX 20 MIN: CPT | Mod: GP | Performed by: PHYSICAL THERAPIST

## 2021-01-22 PROCEDURE — 97110 THERAPEUTIC EXERCISES: CPT | Mod: GP | Performed by: PHYSICAL THERAPIST

## 2021-01-22 NOTE — PROGRESS NOTES
[facility] Initial Evaluation  Subjective:  The history is provided by the patient. No  was used.   Therapist Generated HPI Evaluation  Problem details: Pt reports onset of low back pain in June 2020 after a drive to [place] and back. It's nagged since then. More recently in October 2020 moved and did a lot of lifting and the symptoms increased. Went to chiro with limited results. Saw MD in Jan 2021 and referred to PT. .         Type of problem:  Lumbar.    This is a chronic condition.  Condition occurred with:  Other reason.  Where condition occurred: in the community.  Patient reports pain:  Lumbar spine left.  Pain is described as aching and sharp and is intermittent.  Pain radiates to:  Gluteals left. Pain is the same all the time.  Since onset symptoms are unchanged (but it happens more often).  Associated symptoms:  Loss of motion/stiffness, tingling and numbness (intermittent N/T in L last 3 toes). Symptoms are exacerbated by walking, sitting and lifting (sitting and walking too long, demonstrates shiftng to L)  and relieved by other (self massage, stretches in morning (demonstrates reaching overhead, bending forward)).    Previous treatment includes chiropractic. There was mild improvement following previous treatment.  Restrictions due to condition include:  Working in normal job without restrictions.  Barriers include:  None as reported by patient.    Patient Health History         Pain is reported as 4/10 on pain scale.  General health as reported by patient is good.  Pertinent medical history includes: menopausal and thyroid problems.   Red flags:  None as reported by patient.  Medical allergies: adhesives.   Surgeries include:  Orthopedic surgery and other (wrist, gallbaldder, appendix, benign lumpectomy).    Current medications:  Thyroid medication.    Current occupation is  operations.   Primary job tasks include:  Computer work (lifting a lot from  recent move).                                    Objective:  System         Lumbar/SI Evaluation    Lumbar Myotomes:  normal                Lumbar Dermtomes:              L5 Left:  Hypo-light touch                                                                  Tri Lumbar Evaluation    Posture:  Sitting: fair    Lordosis: WNL        Movement Loss:  Flexion (Flex): nil and pain  Extension (EXT): min and nil  Side Glide R (SG R): nil and pain  Side Glide L (SG L): nil  Test Movements:        EIL: During: no effect  After: no effect  Mechanical Response: no effect  Repeat EIL: During: decreases  After: better  Mechanical Response: IncROM        Conclusion: derangement  Principle of Treatment:  Posture Correction: lumbar support    Extension: EIL with lock/sag 10-15 reps every 2-3 hrs                                           ROS    Assessment/Plan:    Patient is a 50 year old female with lumbar complaints.    Patient has the following significant findings with corresponding treatment plan.                Diagnosis 1:  L lumbar above knee derangement  Pain -  manual therapy, self management, education, directional preference exercise and home program  Decreased ROM/flexibility - manual therapy, therapeutic exercise, therapeutic activity and home program  Inflammation - self management/home program  Decreased function - therapeutic activities and home program  Impaired posture - neuro re-education, therapeutic activities and home program    Cumulative Therapy Evaluation is: Low complexity.    Previous and current functional limitations:  (See Goal Flow Sheet for this information)    Short term and Long term goals: (See Goal Flow Sheet for this information)     Communication ability:  Patient appears to be able to clearly communicate and understand verbal and written communication and follow directions correctly.  Treatment Explanation - The following has been discussed with the patient:   RX ordered/plan of  care  Anticipated outcomes  Possible risks and side effects  This patient would benefit from PT intervention to resume normal activities.   Rehab potential is excellent.    Frequency:  1 X week, once daily  Duration:  for 6 weeks  Discharge Plan:  Achieve all LTG.  Independent in home treatment program.  Reach maximal therapeutic benefit.    Please refer to the daily flowsheet for treatment today, total treatment time and time spent performing 1:1 timed codes.

## 2021-01-26 ENCOUNTER — E-VISIT (OUTPATIENT)
Dept: FAMILY MEDICINE | Facility: CLINIC | Age: 51
End: 2021-01-26

## 2021-01-26 ENCOUNTER — OFFICE VISIT (OUTPATIENT)
Dept: FAMILY MEDICINE | Facility: CLINIC | Age: 51
End: 2021-01-26
Payer: COMMERCIAL

## 2021-01-26 VITALS
RESPIRATION RATE: 16 BRPM | DIASTOLIC BLOOD PRESSURE: 96 MMHG | SYSTOLIC BLOOD PRESSURE: 144 MMHG | OXYGEN SATURATION: 98 % | TEMPERATURE: 97.7 F | WEIGHT: 186 LBS | HEART RATE: 107 BPM | BODY MASS INDEX: 30.48 KG/M2

## 2021-01-26 DIAGNOSIS — N39.0 ACUTE UTI (URINARY TRACT INFECTION): ICD-10-CM

## 2021-01-26 DIAGNOSIS — N76.0 BV (BACTERIAL VAGINOSIS): ICD-10-CM

## 2021-01-26 DIAGNOSIS — B96.89 BV (BACTERIAL VAGINOSIS): ICD-10-CM

## 2021-01-26 DIAGNOSIS — N89.8 VAGINAL IRRITATION: ICD-10-CM

## 2021-01-26 DIAGNOSIS — R30.0 DYSURIA: Primary | ICD-10-CM

## 2021-01-26 DIAGNOSIS — Z53.9 ERRONEOUS ENCOUNTER--DISREGARD: Primary | ICD-10-CM

## 2021-01-26 LAB
ALBUMIN UR-MCNC: NEGATIVE MG/DL
APPEARANCE UR: CLEAR
BILIRUB UR QL STRIP: NEGATIVE
COLOR UR AUTO: YELLOW
GLUCOSE UR STRIP-MCNC: NEGATIVE MG/DL
HGB UR QL STRIP: NEGATIVE
KETONES UR STRIP-MCNC: NEGATIVE MG/DL
LEUKOCYTE ESTERASE UR QL STRIP: NEGATIVE
NITRATE UR QL: NEGATIVE
PH UR STRIP: 5.5 PH (ref 5–7)
SOURCE: NORMAL
SP GR UR STRIP: 1.01 (ref 1–1.03)
SPECIMEN SOURCE: ABNORMAL
UROBILINOGEN UR STRIP-ACNC: 0.2 EU/DL (ref 0.2–1)
WET PREP SPEC: ABNORMAL

## 2021-01-26 PROCEDURE — 99214 OFFICE O/P EST MOD 30 MIN: CPT | Performed by: FAMILY MEDICINE

## 2021-01-26 PROCEDURE — 81003 URINALYSIS AUTO W/O SCOPE: CPT | Performed by: FAMILY MEDICINE

## 2021-01-26 PROCEDURE — 87210 SMEAR WET MOUNT SALINE/INK: CPT | Performed by: FAMILY MEDICINE

## 2021-01-26 RX ORDER — FLUCONAZOLE 150 MG/1
150 TABLET ORAL ONCE
Qty: 2 TABLET | Refills: 0 | Status: SHIPPED | OUTPATIENT
Start: 2021-01-26 | End: 2021-01-26

## 2021-01-26 RX ORDER — METRONIDAZOLE 500 MG/1
500 TABLET ORAL 2 TIMES DAILY
Qty: 14 TABLET | Refills: 0 | Status: SHIPPED | OUTPATIENT
Start: 2021-01-26 | End: 2021-02-02

## 2021-01-26 NOTE — PROGRESS NOTES
Assessment & Plan       ICD-10-CM    1. Dysuria  R30.0 *UA reflex to Microscopic and Culture ([place] and [facility] [place] (except [place] and [place])   2. Vaginal irritation  N89.8 Wet prep     fluconazole (DIFLUCAN) 150 MG tablet   3. BV (bacterial vaginosis)  N76.0 metroNIDAZOLE (FLAGYL) 500 MG tablet    B96.89       Pt with very bothersome vaginal irritation, starting yesterday- sx's mostly in whole vaginal area, along with urinary urgency and frequency.  UA negative- unlikely UTI.  Exam of vaginal area clear- no erythema, lesions, papules or signs of irritation (pt later admits to new hot tub).  No new sexual partners- no concerns for other partners.  Wet prep positive for clue cells- tends to not give that intense of vaginal irritation, but have seen it.  Will treat with metronidazole.  With sx's more sx's with yeast, will also send in rx for diflucan- can take one if sx's not improving on metronidazole after ~3 days (and can repeat again at end of course if needed).  Reviewed hot tub folliculitis issues- if sx's like those appear in vaginal area, rtc for recheck.  Also rec f/u at clinic for retesting if sx's are not improving with above txt.  Risks and benefits of medication(s) including potential side effects reviewed with patient.  Questions answered.       Muna Escobedo MD  [facility]          La Nena Camacho is a 50 year old who presents to clinic today for the following health issues vaginal and urinary irritation.    HPI       Genitourinary - Female  Onset/Duration: 3 days  Description:   Painful urination (Dysuria): YES           Frequency: YES  Blood in urine (Hematuria): no  Delay in urine (Hesitency): YES  Intensity: moderate  Progression of Symptoms:  same  Accompanying Signs & Symptoms:  Fever/chills: no  Flank pain: no  Nausea and vomiting: no  Vaginal symptoms: burning  Abdominal/Pelvic Pain: no  History:   History of frequent UTI s: no  History of  kidney stones: no  Sexually Active: YES  Possibility of pregnancy: No  Precipitating or alleviating factors: None  Therapies tried and outcome: Cranberry juice prn (contraindicated in Coumadin patients) and Increase fluid intake    Urinary urgency and frequency.  She states she's in so much pain - vaginal entry, looks red and swollen.  Burning sensation- outside of vagina, skin, entrance to vagina and up towards her clitoris.  More pressure.  Started last night, feels pelvic bloating, but it also feels 'jiggly'.    No recent abx.    Taking ibuprofen, which seems to knock back the sx's a bit.    No intercourse x 6 wks.    When she was in last time, had her physical, and found mild vaginal prolapse.  For that, has [facility] on Monday with Ob/gyn.  Felt like vaginal area was swollen then.  Noticed two spots on outer vaginal wall.  Spots checked via dermatology- all fine.      Gi sx's- chronic issues with constipation.  Takes fiber pills 2x/day.  Every couple wks, does smooth move tea.  Has had cholecystectomy- since then, had more loose stools.  But more intermittent constipation lately- 'pellet poop'.  Since Christmas, taking smooth move every other week or more.  2 tabs once daily of the fiber.    Colonoscopy scheduled.      Review of Systems   Constitutional, HEENT, cardiovascular, pulmonary, gi and gu systems are negative, except as otherwise noted.      Objective    BP (!) 144/96 (BP Location: Right arm, Cuff Size: Adult Regular)   Pulse 107   Temp 97.7  F (36.5  C) (Tympanic)   Resp 16   Wt 84.4 kg (186 lb)   LMP 08/12/2016   SpO2 98%   BMI 30.48 kg/m    Body mass index is 30.48 kg/m .  Physical Exam   GENERAL APPEARANCE: healthy, alert and no distress     EYES: PERRL, sclera clear     HENT: nose and mouth without ulcers or lesions     NECK: no adenopathy, no asymmetry, masses, or scars and thyroid normal to palpation     RESP: lungs clear to auscultation - no rales, rhonchi or wheezes      CV: regular rates and rhythm, normal S1 S2, no S3 or S4 and no murmur, click or rub      Abdomen: soft, nontender, no HSM or masses and bowel sounds normal     Pelvic: normal external genitalia, normal vaginal mucosa and cervix, slight vaginal discharge, no sign of irritation or lesions      Ext: warm, dry, no edema     Results for orders placed or performed in visit on 01/26/21   *UA reflex to Microscopic and Culture ([place] and [facility] (except [place] and [place])     Status: None    Specimen: Midstream Urine   Result Value Ref Range    Color Urine Yellow     Appearance Urine Clear     Glucose Urine Negative NEG^Negative mg/dL    Bilirubin Urine Negative NEG^Negative    Ketones Urine Negative NEG^Negative mg/dL    Specific Gravity Urine 1.015 1.003 - 1.035    Blood Urine Negative NEG^Negative    pH Urine 5.5 5.0 - 7.0 pH    Protein Albumin Urine Negative NEG^Negative mg/dL    Urobilinogen Urine 0.2 0.2 - 1.0 EU/dL    Nitrite Urine Negative NEG^Negative    Leukocyte Esterase Urine Negative NEG^Negative    Source Midstream Urine    Wet prep     Status: Abnormal    Specimen: Vagina   Result Value Ref Range    Specimen Description Vagina     Wet Prep No Trichomonas seen     Wet Prep Many  Clue cells seen   (A)     Wet Prep No yeast seen     Wet Prep Many  WBC'S seen

## 2021-01-26 NOTE — NURSING NOTE
"Chief Complaint   Patient presents with     Urinary Problem     initial BP (!) 144/96 (BP Location: Right arm, Cuff Size: Adult Regular)   Pulse 107   Temp 97.7  F (36.5  C) (Tympanic)   Resp 16   Wt 84.4 kg (186 lb)   LMP 08/12/2016   SpO2 98%   BMI 30.48 kg/m   Estimated body mass index is 30.48 kg/m  as calculated from the following:    Height as of 1/13/21: 1.664 m (5' 5.5\").    Weight as of this encounter: 84.4 kg (186 lb).  BP completed using cuff size: regular.  R arm      Health Maintenance that is potentially due pending provider review:  NONE    n/a    Yaya Buitrago ma  "

## 2021-01-26 NOTE — TELEPHONE ENCOUNTER
Triage- could you please see if pt could come in asap/urgently this afternoon (okay to DB in my schedule) to test for UTI, and treat if appropriate?  Thanks!  CW      Provider E-Visit time total (minutes):

## 2021-01-27 RX ORDER — SODIUM PICOSULFATE, MAGNESIUM OXIDE, AND ANHYDROUS CITRIC ACID 10; 3.5; 12 MG/160ML; G/160ML; G/160ML
1 LIQUID ORAL SEE ADMIN INSTRUCTIONS
Qty: 160 ML | Refills: 0 | Status: SHIPPED | OUTPATIENT
Start: 2021-01-27 | End: 2022-03-25

## 2021-01-27 NOTE — PATIENT INSTRUCTIONS
Dear Janice Morgan    After reviewing your responses, I've been able to diagnose you with a urinary tract infection, which is a common infection of the bladder with bacteria.  This is not a sexually transmitted infection, though urinating immediately after intercourse can help prevent infections.  Drinking lots of fluids is also helpful to clear your current infection and prevent the next one.      I have sent a prescription for antibiotics to your pharmacy to treat this infection.    It is important that you take all of your prescribed medication even if your symptoms are improving after a few doses.  Taking all of your medicine helps prevent the symptoms from returning.     If your symptoms worsen, you develop pain in your back or stomach, develop fevers, or are not improving in 5 days, please contact your primary care provider for an appointment or visit any of our convenient Walk-in or Urgent Care Centers to be seen, which can be found on our website here.    Thanks again for choosing us as your health care partner,    Leana Alcala RN

## 2021-01-30 DIAGNOSIS — Z11.59 ENCOUNTER FOR SCREENING FOR OTHER VIRAL DISEASES: ICD-10-CM

## 2021-01-30 LAB
LABORATORY COMMENT REPORT: NORMAL
SARS-COV-2 RNA RESP QL NAA+PROBE: NEGATIVE
SARS-COV-2 RNA RESP QL NAA+PROBE: NORMAL
SPECIMEN SOURCE: NORMAL
SPECIMEN SOURCE: NORMAL

## 2021-01-30 PROCEDURE — 87635 SARS-COV-2 COVID-19 AMP PRB: CPT | Performed by: SPECIALIST

## 2021-02-01 ENCOUNTER — HOSPITAL ENCOUNTER (OUTPATIENT)
Dept: MAMMOGRAPHY | Facility: CLINIC | Age: 51
Discharge: HOME | End: 2021-02-01
Attending: FAMILY MEDICINE | Admitting: FAMILY MEDICINE
Payer: COMMERCIAL

## 2021-02-01 DIAGNOSIS — Z12.31 VISIT FOR SCREENING MAMMOGRAM: ICD-10-CM

## 2021-02-01 PROCEDURE — 77063 BREAST TOMOSYNTHESIS BI: CPT

## 2021-02-03 ENCOUNTER — HOSPITAL ENCOUNTER (OUTPATIENT)
Facility: AMBULATORY SURGERY CENTER | Age: 51
Discharge: HOME | End: 2021-02-03
Attending: SPECIALIST | Admitting: SPECIALIST
Payer: COMMERCIAL

## 2021-02-03 VITALS
SYSTOLIC BLOOD PRESSURE: 135 MMHG | OXYGEN SATURATION: 97 % | HEART RATE: 76 BPM | DIASTOLIC BLOOD PRESSURE: 86 MMHG | RESPIRATION RATE: 16 BRPM | TEMPERATURE: 97.7 F

## 2021-02-03 DIAGNOSIS — Z12.11 COLON CANCER SCREENING: Primary | ICD-10-CM

## 2021-02-03 LAB — COLONOSCOPY: NORMAL

## 2021-02-03 PROCEDURE — 45378 DIAGNOSTIC COLONOSCOPY: CPT

## 2021-02-03 PROCEDURE — G8907 PT DOC NO EVENTS ON DISCHARG: HCPCS

## 2021-02-03 PROCEDURE — G8918 PT W/O PREOP ORDER IV AB PRO: HCPCS

## 2021-02-03 RX ORDER — SODIUM CHLORIDE, SODIUM LACTATE, POTASSIUM CHLORIDE, CALCIUM CHLORIDE 600; 310; 30; 20 MG/100ML; MG/100ML; MG/100ML; MG/100ML
500 INJECTION, SOLUTION INTRAVENOUS CONTINUOUS
Status: DISCONTINUED | OUTPATIENT
Start: 2021-02-03 | End: 2021-02-04 | Stop reason: HOSPADM

## 2021-02-03 RX ORDER — FENTANYL CITRATE 50 UG/ML
INJECTION, SOLUTION INTRAMUSCULAR; INTRAVENOUS PRN
Status: DISCONTINUED | OUTPATIENT
Start: 2021-02-03 | End: 2021-02-03 | Stop reason: HOSPADM

## 2021-02-03 RX ORDER — LIDOCAINE 40 MG/G
CREAM TOPICAL
Status: DISCONTINUED | OUTPATIENT
Start: 2021-02-03 | End: 2021-02-04 | Stop reason: HOSPADM

## 2021-02-03 RX ORDER — ONDANSETRON 2 MG/ML
4 INJECTION INTRAMUSCULAR; INTRAVENOUS
Status: COMPLETED | OUTPATIENT
Start: 2021-02-03 | End: 2021-02-03

## 2021-02-03 RX ADMIN — SODIUM CHLORIDE, SODIUM LACTATE, POTASSIUM CHLORIDE, CALCIUM CHLORIDE 500 ML: 600; 310; 30; 20 INJECTION, SOLUTION INTRAVENOUS at 10:15

## 2021-02-03 RX ADMIN — ONDANSETRON 4 MG: 2 INJECTION INTRAMUSCULAR; INTRAVENOUS at 10:12

## 2021-02-03 NOTE — H&P
Pre-Endoscopy History and Physical     Janice Morgan MRN# [id number]   YOB: 1970 Age: 50 year old     Date of Procedure: 2/3/2021  Primary care provider: Yaneth Scott  Type of Endoscopy: Colonoscopy with possible biopsy, possible polypectomy  Reason for Procedure: screening  Type of Anesthesia Anticipated: Conscious Sedation    HPI:    Janice is a 50 year old female who will be undergoing the above procedure.      A history and physical has been performed. The patient's medications and allergies have been reviewed. The risks and benefits of the procedure and the sedation options and risks were discussed with the patient.  All questions were answered and informed consent was obtained.      She denies a personal or family history of anesthesia complications or bleeding disorders.     Patient Active Problem List   Diagnosis     Hashimoto's thyroiditis     CARDIOVASCULAR SCREENING; LDL GOAL LESS THAN 160     Moderate major depression (H)     Migraine     Fevers, unknown origin     Cervical high risk HPV (human papillomavirus) test positive     Menopausal syndrome (hot flashes)     Overweight     Major depressive disorder, recurrent episode, moderate (H)     Anxiety     Left low back pain        Past Medical History:   Diagnosis Date     Cervical high risk HPV (human papillomavirus) test positive 10/2014    NIL pap, + HPV (not 16 or 18) '16 HPV neg     Hashimoto's thyroiditis 10/8/2010        Past Surgical History:   Procedure Laterality Date     C ANALGESIA,EPIDURAL,LABOR &   , 2005     HC EXCISION BREAST LESION, OPEN >=1  2001     LAPAROSCOPIC APPENDECTOMY  2013    Procedure: LAPAROSCOPIC APPENDECTOMY;  Laparoscopic Appendectomy;  Surgeon: Mercy Leal MD;  Location:  OR     LAPAROSCOPIC CHOLECYSTECTOMY N/A 2019    Procedure: LAPAROSCOPIC CHOLECYSTECTOMY;  Surgeon: Marlene Taylor MD;  Location:  OR       Social History     Tobacco Use      Smoking status: Former Smoker     Smokeless tobacco: Never Used     Tobacco comment: quit for 4 years.   Substance Use Topics     Alcohol use: Yes     Comment: 1-2 drinks per month       Family History   Problem Relation Age of Onset     Asthma Mother      Psychotic Disorder Mother      Musculoskeletal Disorder Mother         fibromyalgia     C.A.D. Maternal Grandfather      Heart Disease Maternal Grandfather      Cerebrovascular Disease Maternal Grandfather      Diabetes Paternal Grandfather      Cancer Maternal Grandmother         lung     Cancer Paternal Grandmother         stomach       Prior to Admission medications    Medication Sig Start Date End Date Taking? Authorizing Provider   levothyroxine (SYNTHROID/LEVOTHROID) 175 MCG tablet  12/1/20  Yes Reported, Patient   metroNIDAZOLE (FLAGYL) 500 MG tablet Take 1 tablet (500 mg) by mouth 2 times daily for 7 days 1/26/21 2/2/21 Yes Muna Escobedo MD   polyethylene glycol (GOLYTELY) 236 g suspension Take 4,000 mLs by mouth See Admin Instructions The following are available over the counter:  1. One ten-ounce bottle of Citrate of Magnesium; Take at 7 pm, two days before you procedure.  2.  1 oz simethicone solution (40 mg/0.6 ml)  3.  Prescription 1 4-liter container.  In the evening before your procedure, fill the container with water to 4-liter fill line (if you purchased NuLytely, first add the contents of the flavor powder).  After capping the container, shake vigorously several times.  Do not refrigerate.  Sig: Follow split dose (2-Day) regimen:  At 6 pm before before your procedure:  Drink 8 oz. (240 ml) every 10 minutes, until 2 liters are consumed.  In the AM of you procedure: Add 1 teaspoon of simethicone (40 mg/0.6 ml) to the remaining 2 L prep.  Starting five (5) hours before your procedure, drink 8 oz. (240 ml) every 10 minutes until the remaining 2 liters are consumed.  Complete this step at least 2 hours before your arrival time. 1/27/21  Yes  "Marcelo Amador MD   Sod Picosulfate-Mag Ox-Cit Acd (CLENPIQ) 10-3.5-12 MG-GM -GM/160ML SOLN Take 1 Bottle by mouth See Admin Instructions Follow Spit Dose (2-Day) Regimen: Day1: The evening before your procedure: Drink on 5.4 oz bottle. Then drink at least 5- 8 oz cups of water within 5 hours.  Day 2: The morning of your procedure:  Starting 5 hours before your colonoscopy, drink the contents of the other 5.4 oz bottle.  Then drink at least 3 (three)- 8 oz cups of water, add 1 teaspoon of simethicone (40 mg/0.6 ml) to the second glass of water.  Complete this at least 2 hours before you arrival time. 1/27/21  Yes Marcelo Amador MD   gabapentin (NEURONTIN) 100 MG capsule Take 100-200 mg by mouth every evening     Reported, Patient   HERBALS     Reported, Patient       Allergies   Allergen Reactions     Erythromycin Hives     Tape [Adhesive Tape]      Colth tape is the one that gives her the reaction.Burn type reacton , raw.     Penicillin [Penicillins]      Family alergies to the antibiotic. So it was never given to her.        REVIEW OF SYSTEMS:   5 point ROS negative except as noted above in HPI, including Gen., Resp., CV, GI &  system review.    PHYSICAL EXAM:   BP (!) 154/106   Pulse 102   Temp 97.7  F (36.5  C) (Temporal)   Resp 16   LMP 08/12/2016   SpO2 97%  Estimated body mass index is 30.48 kg/m  as calculated from the following:    Height as of 1/13/21: 1.664 m (5' 5.5\").    Weight as of 1/26/21: 84.4 kg (186 lb).   GENERAL APPEARANCE: alert, and oriented  MENTAL STATUS: alert  AIRWAY EXAM: Mallampatti Class I (visualization of the soft palate, fauces, uvula, anterior and posterior pillars)  RESP: lungs clear to auscultation - no rales, rhonchi or wheezes  CV: regular rates and rhythm  DIAGNOSTICS:    Not indicated    IMPRESSION   ASA Class 2 - Mild systemic disease    PLAN:   Plan for Colonoscopy with possible biopsy, possible polypectomy. We discussed the risks, benefits and alternatives and the " patient wished to proceed.    The above has been forwarded to the consulting provider.      Signed Electronically by: Marcelo Amador MD  February 3, 2021

## 2021-04-05 ENCOUNTER — MYC MEDICAL ADVICE (OUTPATIENT)
Dept: FAMILY MEDICINE | Facility: CLINIC | Age: 51
End: 2021-04-05

## 2021-06-07 PROBLEM — M54.50 LEFT LOW BACK PAIN: Status: RESOLVED | Noted: 2021-01-22 | Resolved: 2021-06-07

## 2021-09-25 ENCOUNTER — HEALTH MAINTENANCE LETTER (OUTPATIENT)
Age: 51
End: 2021-09-25

## 2022-03-12 ENCOUNTER — HEALTH MAINTENANCE LETTER (OUTPATIENT)
Age: 52
End: 2022-03-12

## 2022-03-23 ENCOUNTER — TELEPHONE (OUTPATIENT)
Dept: FAMILY MEDICINE | Facility: CLINIC | Age: 52
End: 2022-03-23
Payer: COMMERCIAL
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
## 2022-03-25 ENCOUNTER — OFFICE VISIT (OUTPATIENT)
Dept: FAMILY MEDICINE | Facility: CLINIC | Age: 52
End: 2022-03-25
Payer: COMMERCIAL

## 2022-03-25 VITALS
DIASTOLIC BLOOD PRESSURE: 80 MMHG | WEIGHT: 201 LBS | HEIGHT: 66 IN | SYSTOLIC BLOOD PRESSURE: 128 MMHG | HEART RATE: 98 BPM | OXYGEN SATURATION: 98 % | TEMPERATURE: 98.1 F | BODY MASS INDEX: 32.3 KG/M2 | RESPIRATION RATE: 15 BRPM

## 2022-03-25 DIAGNOSIS — S29.019A STRAIN OF THORACIC REGION, INITIAL ENCOUNTER: ICD-10-CM

## 2022-03-25 DIAGNOSIS — S16.1XXA CERVICAL STRAIN, INITIAL ENCOUNTER: Primary | ICD-10-CM

## 2022-03-25 PROCEDURE — 99213 OFFICE O/P EST LOW 20 MIN: CPT | Performed by: PHYSICIAN ASSISTANT

## 2022-03-25 RX ORDER — METHOCARBAMOL 500 MG/1
500 TABLET, FILM COATED ORAL 4 TIMES DAILY PRN
Qty: 20 TABLET | Refills: 0 | Status: SHIPPED | OUTPATIENT
Start: 2022-03-25 | End: 2022-12-09

## 2022-04-10 NOTE — PATIENT INSTRUCTIONS
PLEASE CALL TO SCHEDULE YOUR MAMMOGRAM  [facility] [phone]  [facility] [phone]  [facility]   [phone]  Central Scheduling (all locations) [phone]    If you are under/uninsured, we recommend you contact the [facility]. They offer mammograms on a sliding fee charge. You can schedule with them at [phone].   Preventive Health Recommendations  Female Ages 50 - 64    Yearly exam: See your health care provider every year in order to  Review health changes.   Discuss preventive care.    Review your medicines if your doctor has prescribed any.    Get a Pap test every three years (unless you have an abnormal result and your provider advises testing more often).  If you get Pap tests with HPV test, you only need to test every 5 years, unless you have an abnormal result.   You do not need a Pap test if your uterus was removed (hysterectomy) and you have not had cancer.  You should be tested each year for STDs (sexually transmitted diseases) if you're at risk.   Have a mammogram every 1 to 2 years.  Have a colonoscopy at age 50, or have a yearly FIT test (stool test). These exams screen for colon cancer.    Have a cholesterol test every 5 years, or more often if advised.  Have a diabetes test (fasting glucose) every three years. If you are at risk for diabetes, you should have this test more often.   If you are at risk for osteoporosis (brittle bone disease), think about having a bone density scan (DEXA).    Shots: Get a flu shot each year. Get a tetanus shot every 10 years.    Nutrition:   Eat at least 5 servings of fruits and vegetables each day.  Eat whole-grain bread, whole-wheat pasta and brown rice instead of white grains and rice.  Get adequate Calcium and Vitamin D.     Lifestyle  Exercise at least 150 minutes a week (30 minutes a day, 5 days a week). This will help you control your weight and prevent disease.  Limit alcohol to one drink per  day.  No smoking.   Wear sunscreen to prevent skin cancer.   See your dentist every six months for an exam and cleaning.  See your eye doctor every 1 to 2 years.
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
## 2022-04-12 ENCOUNTER — THERAPY VISIT (OUTPATIENT)
Dept: PHYSICAL THERAPY | Facility: CLINIC | Age: 52
End: 2022-04-12
Attending: FAMILY MEDICINE
Payer: OTHER MISCELLANEOUS

## 2022-04-12 ENCOUNTER — TELEPHONE (OUTPATIENT)
Dept: FAMILY MEDICINE | Facility: CLINIC | Age: 52
End: 2022-04-12

## 2022-04-12 ENCOUNTER — OFFICE VISIT (OUTPATIENT)
Dept: FAMILY MEDICINE | Facility: CLINIC | Age: 52
End: 2022-04-12
Payer: COMMERCIAL

## 2022-04-12 VITALS
DIASTOLIC BLOOD PRESSURE: 80 MMHG | RESPIRATION RATE: 16 BRPM | HEART RATE: 100 BPM | OXYGEN SATURATION: 99 % | TEMPERATURE: 98.3 F | BODY MASS INDEX: 31.82 KG/M2 | SYSTOLIC BLOOD PRESSURE: 138 MMHG | HEIGHT: 66 IN | WEIGHT: 198 LBS

## 2022-04-12 DIAGNOSIS — R03.0 ELEVATED BP WITHOUT DIAGNOSIS OF HYPERTENSION: ICD-10-CM
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
## 2022-04-12 DIAGNOSIS — G44.209 TENSION HEADACHE: ICD-10-CM

## 2022-04-12 DIAGNOSIS — M54.2 CERVICALGIA: ICD-10-CM

## 2022-04-12 DIAGNOSIS — M54.2 CERVICALGIA: Primary | ICD-10-CM

## 2022-04-12 PROCEDURE — 99214 OFFICE O/P EST MOD 30 MIN: CPT | Performed by: FAMILY MEDICINE

## 2022-04-12 PROCEDURE — 97140 MANUAL THERAPY 1/> REGIONS: CPT | Mod: GP | Performed by: PHYSICAL THERAPIST

## 2022-04-12 PROCEDURE — 97161 PT EVAL LOW COMPLEX 20 MIN: CPT | Mod: GP | Performed by: PHYSICAL THERAPIST

## 2022-04-12 PROCEDURE — 97110 THERAPEUTIC EXERCISES: CPT | Mod: GP | Performed by: PHYSICAL THERAPIST

## 2022-04-12 ASSESSMENT — ENCOUNTER SYMPTOMS
DIARRHEA: 0
FEVER: 0
HEMATURIA: 0
NERVOUS/ANXIOUS: 0
HEARTBURN: 0
SORE THROAT: 0
ARTHRALGIAS: 1
DYSURIA: 0
EYE PAIN: 0
MYALGIAS: 1
SHORTNESS OF BREATH: 0
WEAKNESS: 0
PARESTHESIAS: 0
CHILLS: 0
PALPITATIONS: 0
COUGH: 0
JOINT SWELLING: 0
DIZZINESS: 0
FREQUENCY: 0
ABDOMINAL PAIN: 0
HEMATOCHEZIA: 0
NAUSEA: 0
CONSTIPATION: 0
HEADACHES: 1

## 2022-04-12 ASSESSMENT — PATIENT HEALTH QUESTIONNAIRE - PHQ9: SUM OF ALL RESPONSES TO PHQ QUESTIONS 1-9: 2

## 2022-04-12 NOTE — NURSING NOTE
"Chief Complaint   Patient presents with     Physical     initial BP (!) 146/96   Pulse 100   Temp 98.3  F (36.8  C) (Tympanic)   Resp 16   Ht 1.664 m (5' 5.5\")   Wt 89.8 kg (198 lb)   LMP 08/12/2016   SpO2 99%   BMI 32.45 kg/m   Estimated body mass index is 32.45 kg/m  as calculated from the following:    Height as of this encounter: 1.664 m (5' 5.5\").    Weight as of this encounter: 89.8 kg (198 lb).  BP completed using cuff size: large.  L  arm      Health Maintenance that is potentially due pending provider review:  NONE    n/a    Yaya Buitrago ma  "
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
## 2022-04-12 NOTE — TELEPHONE ENCOUNTER
Reason for Call:  Form, our goal is to have forms completed with 72 hours, however, some forms may require a visit or additional information.    Type of letter, form or note:    [facility] form    Who is the form from?:   [facility]    Where did the form come from: form was faxed in    What clinic location was the form placed at?:   [facility]    Where the form was placed:   Dr. Scott's office    What number is listed as a contact on the form?:   Fax: [phone]       Additional comments: none    Call taken on 4/12/2022 at 3:13 PM by Muna Pack

## 2022-04-13 ENCOUNTER — MEDICAL CORRESPONDENCE (OUTPATIENT)
Dept: HEALTH INFORMATION MANAGEMENT | Facility: CLINIC | Age: 52
End: 2022-04-13
Payer: COMMERCIAL

## 2022-04-14 NOTE — TELEPHONE ENCOUNTER
Forms faxed to [facility] [employer] [facility] fax # [phone] and copy sent to stat scan.     Cheryl STRATTON

## 2022-04-15 ENCOUNTER — THERAPY VISIT (OUTPATIENT)
Dept: PHYSICAL THERAPY | Facility: CLINIC | Age: 52
End: 2022-04-15
Payer: OTHER MISCELLANEOUS
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
## 2022-04-15 DIAGNOSIS — M54.2 CERVICALGIA: Primary | ICD-10-CM

## 2022-04-15 DIAGNOSIS — G44.209 TENSION HEADACHE: ICD-10-CM

## 2022-04-15 PROCEDURE — 97140 MANUAL THERAPY 1/> REGIONS: CPT | Mod: GP | Performed by: PHYSICAL THERAPIST

## 2022-04-15 PROCEDURE — 97112 NEUROMUSCULAR REEDUCATION: CPT | Mod: GP | Performed by: PHYSICAL THERAPIST

## 2022-04-15 PROCEDURE — 97110 THERAPEUTIC EXERCISES: CPT | Mod: GP | Performed by: PHYSICAL THERAPIST

## 2022-04-19 ENCOUNTER — THERAPY VISIT (OUTPATIENT)
Dept: PHYSICAL THERAPY | Facility: CLINIC | Age: 52
End: 2022-04-19
Payer: OTHER MISCELLANEOUS

## 2022-04-19 DIAGNOSIS — M54.2 CERVICALGIA: Primary | ICD-10-CM
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
## 2022-04-19 DIAGNOSIS — G44.209 TENSION HEADACHE: ICD-10-CM

## 2022-04-19 PROCEDURE — 97112 NEUROMUSCULAR REEDUCATION: CPT | Mod: GP | Performed by: PHYSICAL THERAPIST

## 2022-04-19 PROCEDURE — 97110 THERAPEUTIC EXERCISES: CPT | Mod: GP | Performed by: PHYSICAL THERAPIST

## 2022-04-19 PROCEDURE — 97140 MANUAL THERAPY 1/> REGIONS: CPT | Mod: GP | Performed by: PHYSICAL THERAPIST

## 2022-04-26 ENCOUNTER — THERAPY VISIT (OUTPATIENT)
Dept: PHYSICAL THERAPY | Facility: CLINIC | Age: 52
End: 2022-04-26
Payer: OTHER MISCELLANEOUS
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
## 2022-04-26 DIAGNOSIS — M54.2 CERVICALGIA: Primary | ICD-10-CM

## 2022-04-26 DIAGNOSIS — G44.209 TENSION HEADACHE: ICD-10-CM

## 2022-04-26 PROCEDURE — 97140 MANUAL THERAPY 1/> REGIONS: CPT | Mod: GP | Performed by: PHYSICAL THERAPIST

## 2022-04-26 PROCEDURE — 97110 THERAPEUTIC EXERCISES: CPT | Mod: GP | Performed by: PHYSICAL THERAPIST

## 2022-05-05 ENCOUNTER — THERAPY VISIT (OUTPATIENT)
Dept: PHYSICAL THERAPY | Facility: CLINIC | Age: 52
End: 2022-05-05
Payer: OTHER MISCELLANEOUS

## 2022-05-05 DIAGNOSIS — M54.2 CERVICALGIA: Primary | ICD-10-CM

## 2022-05-05 DIAGNOSIS — G44.209 TENSION HEADACHE: ICD-10-CM

## 2022-05-05 PROCEDURE — 97110 THERAPEUTIC EXERCISES: CPT | Mod: GP | Performed by: PHYSICAL THERAPIST

## 2022-05-05 PROCEDURE — 97112 NEUROMUSCULAR REEDUCATION: CPT | Mod: GP | Performed by: PHYSICAL THERAPIST

## 2022-05-07 ENCOUNTER — HEALTH MAINTENANCE LETTER (OUTPATIENT)
Age: 52
End: 2022-05-07

## 2022-05-12 ENCOUNTER — THERAPY VISIT (OUTPATIENT)
Dept: PHYSICAL THERAPY | Facility: CLINIC | Age: 52
End: 2022-05-12
Payer: OTHER MISCELLANEOUS

## 2022-05-12 DIAGNOSIS — M54.2 CERVICALGIA: ICD-10-CM

## 2022-05-12 DIAGNOSIS — G44.209 TENSION HEADACHE: Primary | ICD-10-CM

## 2022-05-12 PROCEDURE — 97112 NEUROMUSCULAR REEDUCATION: CPT | Mod: GP | Performed by: PHYSICAL THERAPIST

## 2022-05-12 PROCEDURE — 97110 THERAPEUTIC EXERCISES: CPT | Mod: GP | Performed by: PHYSICAL THERAPIST

## 2022-05-12 PROCEDURE — 97140 MANUAL THERAPY 1/> REGIONS: CPT | Mod: GP | Performed by: PHYSICAL THERAPIST

## 2022-06-06 ENCOUNTER — E-VISIT (OUTPATIENT)
Dept: URGENT CARE | Facility: CLINIC | Age: 52
End: 2022-06-06
Payer: COMMERCIAL

## 2022-06-06 DIAGNOSIS — R05.9 COUGH: Primary | ICD-10-CM

## 2022-06-06 PROCEDURE — 99207 PR NON-BILLABLE SERV PER CHARTING: CPT | Performed by: FAMILY MEDICINE

## 2022-06-06 NOTE — PATIENT INSTRUCTIONS
Dear Janice Morgan,    We are sorry you are not feeling well. Based on the responses you provided, it is recommended that you be seen in-person in urgent care so we can better evaluate your symptoms. Please click here to find the nearest urgent care location to you.   You will not be charged for this Visit. Thank you for trusting us with your care.    Carolyn Rodriguez MD

## 2022-06-16 ENCOUNTER — TELEPHONE (OUTPATIENT)
Dept: FAMILY MEDICINE | Facility: CLINIC | Age: 52
End: 2022-06-16
Payer: COMMERCIAL

## 2022-06-16 DIAGNOSIS — R05.9 COUGH: Primary | ICD-10-CM

## 2022-06-16 RX ORDER — BENZONATATE 100 MG/1
100 CAPSULE ORAL 3 TIMES DAILY PRN
Qty: 15 CAPSULE | Refills: 0 | Status: SHIPPED | OUTPATIENT
Start: 2022-06-16 | End: 2022-12-09

## 2022-06-16 NOTE — TELEPHONE ENCOUNTER
FS (DOD),      Please address due to LS absence.  Patient seen in ED ([facility]) on 6/6 for bronchitis, sinusitis.    Was given Doxycycline to take if symptoms continued for 7 to 10 days after seen.    Patient started Doxycycline 2 days ago and reports she is starting to feel better.    Was given Benzonatate 100 mg #15 take 3 times daily as needed for cough which has also been helping.  Asking for refill.    Coughing so much reports her ribs/chest muscles hurt    Order T'd up.    Thank you,  Nuzhat Powell, RN      Triage note: No need to call patient back unless Rx denied.

## 2022-10-20 ENCOUNTER — HOSPITAL ENCOUNTER (OUTPATIENT)
Dept: MAMMOGRAPHY | Facility: CLINIC | Age: 52
Discharge: HOME | End: 2022-10-20
Attending: FAMILY MEDICINE | Admitting: FAMILY MEDICINE
Payer: COMMERCIAL

## 2022-10-20 DIAGNOSIS — Z12.31 VISIT FOR SCREENING MAMMOGRAM: ICD-10-CM

## 2022-10-20 PROCEDURE — 77067 SCR MAMMO BI INCL CAD: CPT

## 2022-10-24 LAB — TSH SERPL-ACNC: 0.09 UIU/ML (ref 0.3–4.5)

## 2022-11-02 ENCOUNTER — E-VISIT (OUTPATIENT)
Dept: URGENT CARE | Facility: CLINIC | Age: 52
End: 2022-11-02
Payer: COMMERCIAL

## 2022-11-02 DIAGNOSIS — J06.9 VIRAL URI WITH COUGH: Primary | ICD-10-CM

## 2022-11-02 PROCEDURE — 99421 OL DIG E/M SVC 5-10 MIN: CPT | Performed by: PHYSICIAN ASSISTANT

## 2022-11-02 RX ORDER — BENZONATATE 100 MG/1
100 CAPSULE ORAL 3 TIMES DAILY PRN
Qty: 30 CAPSULE | Refills: 0 | Status: SHIPPED | OUTPATIENT
Start: 2022-11-02 | End: 2022-12-09

## 2022-11-02 NOTE — PATIENT INSTRUCTIONS
"  Dear Janice Morgan    After reviewing your responses, I've been able to diagnose you with \"Bronchitis\" which is a common infection of your lungs that is nearly always caused by a virus. The virus causes swelling and irritation of the air passages of your lungs which leads to cough. The illness spreads from your nose and throat to your windpipe and airways. It is often called a \"chest cold\" and can last up to 2 weeks, but is not a serious illness. Exposure to cigarette smoke usually makes this significantly worse.      To treat bronchitis, the main thing to do is drink lots of fluids and rest. Cough medications over-the-counter such as mucinex, robitussin or \"cold and sinus\" medications can be helpful. I did prescribe benzonatate as well. Ibuprofen and Tylenol also help with fevers or aching feelings that you often have with this kind of illness. Do not take ibuprofen if you have kidney disease, stomach ulcers or allergy to aspirin.     Bronchitis is most often highly contagious as viruses are spread through the air or touch. Avoid contact with others who may become infected, particularly children, the elderly and those whose immune systems might be weak.     If your symptoms worsen, you develop chest pain or shortness of breath, fevers over 101, or are not improving in 5 days, please contact your primary care provider for an appointment or visit any of our convenient Walk-in Care or Urgent Care Centers to be seen which can be found on our website here.    Thanks again for choosing us as your health care partner,    Jennifer Gutierres PA-C  "

## 2022-12-09 ENCOUNTER — VIRTUAL VISIT (OUTPATIENT)
Dept: FAMILY MEDICINE | Facility: CLINIC | Age: 52
End: 2022-12-09
Payer: COMMERCIAL

## 2022-12-09 DIAGNOSIS — U07.1 INFECTION DUE TO 2019 NOVEL CORONAVIRUS: Primary | ICD-10-CM

## 2022-12-09 PROCEDURE — 99213 OFFICE O/P EST LOW 20 MIN: CPT | Mod: CS | Performed by: NURSE PRACTITIONER

## 2022-12-09 NOTE — PROGRESS NOTES
"Janice is a 52 year old who is being evaluated via a billable telephone visit.      What phone number would you like to be contacted at? [phone]  How would you like to obtain your AVS? MyChart    Assessment & Plan     Infection due to 2019 novel coronavirus    - nirmatrelvir and ritonavir (PAXLOVID) therapy pack; Take 3 tablets by mouth 2 times daily for 5 days (Take 2 Nirmatrelvir tablets and 1 Ritonavir tablet twice daily for 5 days)             BMI:   Estimated body mass index is 32.45 kg/m  as calculated from the following:    Height as of 4/12/22: 1.664 m (5' 5.5\").    Weight as of 4/12/22: 89.8 kg (198 lb).       FUTURE APPOINTMENTS:       - Follow up in 1 week for persistent symptoms, sooner for new or worsening symptoms.     See Patient Instructions    No follow-ups on file.    HA Patten CNP  [facility]   Janice is a 52 year old, presenting for the following health issues:  Covid Concern      HPI       COVID-19 Symptom Review  How many days ago did these symptoms start? 2 days, tested positive yesterday     Are any of the following symptoms significant for you?    New or worsening difficulty breathing? No    Worsening cough? Yes, it's a dry cough.     Fever or chills? Yes, I felt feverish or had chills.    Headache: YES    Sore throat: No    Chest pain: No    Diarrhea: YES    Body aches? YES    What treatments has patient tried? nyquil cold medicine    Does patient live in a nursing home, group home, or shelter? No  Does patient have a way to get food/medications during quarantined? Yes, I have a friend or family member who can help me.                Review of Systems   Constitutional, HEENT, cardiovascular, pulmonary, gi and gu systems are negative, except as otherwise noted.      Objective           Vitals:  No vitals were obtained today due to virtual visit.    Physical Exam   healthy, alert and no distress  PSYCH: Alert and oriented times " 3; coherent speech, normal   rate and volume, able to articulate logical thoughts, able   to abstract reason, no tangential thoughts, no hallucinations   or delusions  Her affect is normal and pleasant  RESP: No cough, no audible wheezing, able to talk in full sentences  Remainder of exam unable to be completed due to telephone visits                Phone call duration: 8 minutes

## 2022-12-09 NOTE — PATIENT INSTRUCTIONS
Coping with Life After COVID-19  Being in the hospital because of COVID-19 is scary. Going home can be scary, too. You may face changes to your life, the way you work or what you can eat. It s hard to adjust to change, and it s normal to feel afraid, frustrated or even angry. These feelings usually go away over time. If your feelings don t start to get better, it s called  adjustment disorder.      What signs should I look out for?  Adjustment disorder can happen to anyone in a time of stress. It makes it hard to cope with daily life. You may feel lonely or fight with loved ones, even if you re glad to be home. Watch for these signs:    Fear or worry    Hard time focusing    Sadness or anger    Trouble talking to family or friends    Feeling like you don t fit in or isolating yourself    Problems with sleep     Drinking alcohol or taking drugs to cope    What can I do?  You can help yourself get better. Feeling you have control helps you move forward. You may wonder if you ll be able to do things you did before. Be patient. Do your best to make the most of every day. Try to build relationships, be as active as you can, eat right and keep a sense of humor. Avoid smoking and drinking too much alcohol. Call your family doctor or clinic if you re not sure what to do. They can guide you to care or other services.    When should I get help?  Think about getting counseling if your sadness or frustration gets worse. Together with a trained counselor, you can talk about your problems adjusting to life after your hospital stay. You can come up with new ways to handle changes that give you more control. Your family doctor or care team can help you find a counselor.     Get help if you re thinking about hurting yourself. If you need help right away, call 911 or go to the nearest emergency room. You can also try the Crisis Text Line.    Crisis Text Line: 720-018 (http://www.crisistextline.org)  The Crisis Text Line serves  anyone, in any crisis. It gives free, 24/7 support. Here's how it works:  1. Text HOME to 428230 from anywhere in the [place], anytime, about any type of crisis.  2. A live, trained Crisis Counselor will text you back quickly.  3. The volunteer Crisis Counselor can help you move from a  hot moment  to a  cool moment.  They can also help you work out a safety plan.

## 2022-12-30 ENCOUNTER — TRANSFERRED RECORDS (OUTPATIENT)
Dept: MULTI SPECIALTY CLINIC | Facility: CLINIC | Age: 52
End: 2022-12-30
Payer: COMMERCIAL

## 2022-12-30 LAB
HPV ABSTRACT: NORMAL
PAP-ABSTRACT: NORMAL

## 2023-01-07 ENCOUNTER — HEALTH MAINTENANCE LETTER (OUTPATIENT)
Age: 53
End: 2023-01-07

## 2023-01-25 ENCOUNTER — OFFICE VISIT (OUTPATIENT)
Dept: FAMILY MEDICINE | Facility: CLINIC | Age: 53
End: 2023-01-25
Payer: COMMERCIAL

## 2023-01-25 ENCOUNTER — LAB (OUTPATIENT)
Dept: LAB | Facility: CLINIC | Age: 53
End: 2023-01-25
Payer: COMMERCIAL

## 2023-01-25 VITALS
HEIGHT: 66 IN | OXYGEN SATURATION: 99 % | SYSTOLIC BLOOD PRESSURE: 124 MMHG | BODY MASS INDEX: 31.93 KG/M2 | TEMPERATURE: 98.1 F | WEIGHT: 198.7 LBS | DIASTOLIC BLOOD PRESSURE: 88 MMHG | RESPIRATION RATE: 16 BRPM | HEART RATE: 88 BPM

## 2023-01-25 DIAGNOSIS — Z00.00 ROUTINE GENERAL MEDICAL EXAMINATION AT A HEALTH CARE FACILITY: ICD-10-CM

## 2023-01-25 DIAGNOSIS — Z83.3 FAMILY HISTORY OF DIABETES MELLITUS: ICD-10-CM

## 2023-01-25 DIAGNOSIS — F33.1 MAJOR DEPRESSIVE DISORDER, RECURRENT EPISODE, MODERATE (H): ICD-10-CM

## 2023-01-25 DIAGNOSIS — Z00.00 ROUTINE GENERAL MEDICAL EXAMINATION AT A HEALTH CARE FACILITY: Primary | ICD-10-CM

## 2023-01-25 LAB
ALBUMIN SERPL BCG-MCNC: 4.5 G/DL (ref 3.5–5.2)
ALP SERPL-CCNC: 89 U/L (ref 35–104)
ALT SERPL W P-5'-P-CCNC: 21 U/L (ref 10–35)
ANION GAP SERPL CALCULATED.3IONS-SCNC: 14 MMOL/L (ref 7–15)
AST SERPL W P-5'-P-CCNC: 25 U/L (ref 10–35)
BILIRUB SERPL-MCNC: 0.3 MG/DL
BUN SERPL-MCNC: 15.9 MG/DL (ref 6–20)
CALCIUM SERPL-MCNC: 9.6 MG/DL (ref 8.6–10)
CHLORIDE SERPL-SCNC: 103 MMOL/L (ref 98–107)
CHOLEST SERPL-MCNC: 180 MG/DL
CREAT SERPL-MCNC: 0.87 MG/DL (ref 0.51–0.95)
DEPRECATED HCO3 PLAS-SCNC: 22 MMOL/L (ref 22–29)
GFR SERPL CREATININE-BSD FRML MDRD: 79 ML/MIN/1.73M2
GLUCOSE SERPL-MCNC: 90 MG/DL (ref 70–99)
HBA1C MFR BLD: 5.5 % (ref 0–5.6)
HDLC SERPL-MCNC: 59 MG/DL
LDLC SERPL CALC-MCNC: 100 MG/DL
NONHDLC SERPL-MCNC: 121 MG/DL
POTASSIUM SERPL-SCNC: 4.1 MMOL/L (ref 3.4–5.3)
PROT SERPL-MCNC: 7.1 G/DL (ref 6.4–8.3)
SODIUM SERPL-SCNC: 139 MMOL/L (ref 136–145)
TRIGL SERPL-MCNC: 105 MG/DL
TSH SERPL DL<=0.005 MIU/L-ACNC: 0.46 UIU/ML (ref 0.3–4.2)

## 2023-01-25 PROCEDURE — 80061 LIPID PANEL: CPT

## 2023-01-25 PROCEDURE — 83036 HEMOGLOBIN GLYCOSYLATED A1C: CPT

## 2023-01-25 PROCEDURE — 99396 PREV VISIT EST AGE 40-64: CPT | Performed by: FAMILY MEDICINE

## 2023-01-25 PROCEDURE — 84443 ASSAY THYROID STIM HORMONE: CPT

## 2023-01-25 PROCEDURE — 36415 COLL VENOUS BLD VENIPUNCTURE: CPT

## 2023-01-25 PROCEDURE — 80053 COMPREHEN METABOLIC PANEL: CPT

## 2023-01-25 ASSESSMENT — ENCOUNTER SYMPTOMS
ABDOMINAL PAIN: 0
FEVER: 0
BREAST MASS: 0
DIZZINESS: 0
WEAKNESS: 0
DYSURIA: 0
ARTHRALGIAS: 0
NERVOUS/ANXIOUS: 0
FREQUENCY: 0
NAUSEA: 1
HEARTBURN: 0
SHORTNESS OF BREATH: 0
JOINT SWELLING: 0
DIARRHEA: 0
HEMATOCHEZIA: 0
SORE THROAT: 0
HEADACHES: 0
PARESTHESIAS: 0
PALPITATIONS: 0
EYE PAIN: 0
MYALGIAS: 0
COUGH: 1
CONSTIPATION: 0
CHILLS: 0
HEMATURIA: 0

## 2023-01-25 ASSESSMENT — ANXIETY QUESTIONNAIRES
5. BEING SO RESTLESS THAT IT IS HARD TO SIT STILL: NOT AT ALL
2. NOT BEING ABLE TO STOP OR CONTROL WORRYING: NOT AT ALL
1. FEELING NERVOUS, ANXIOUS, OR ON EDGE: SEVERAL DAYS
8. IF YOU CHECKED OFF ANY PROBLEMS, HOW DIFFICULT HAVE THESE MADE IT FOR YOU TO DO YOUR WORK, TAKE CARE OF THINGS AT HOME, OR GET ALONG WITH OTHER PEOPLE?: NOT DIFFICULT AT ALL
GAD7 TOTAL SCORE: 3
GAD7 TOTAL SCORE: 3
IF YOU CHECKED OFF ANY PROBLEMS ON THIS QUESTIONNAIRE, HOW DIFFICULT HAVE THESE PROBLEMS MADE IT FOR YOU TO DO YOUR WORK, TAKE CARE OF THINGS AT HOME, OR GET ALONG WITH OTHER PEOPLE: NOT DIFFICULT AT ALL
6. BECOMING EASILY ANNOYED OR IRRITABLE: SEVERAL DAYS
7. FEELING AFRAID AS IF SOMETHING AWFUL MIGHT HAPPEN: NOT AT ALL
3. WORRYING TOO MUCH ABOUT DIFFERENT THINGS: NOT AT ALL
7. FEELING AFRAID AS IF SOMETHING AWFUL MIGHT HAPPEN: NOT AT ALL
4. TROUBLE RELAXING: SEVERAL DAYS
GAD7 TOTAL SCORE: 3

## 2023-01-25 ASSESSMENT — PAIN SCALES - GENERAL: PAINLEVEL: NO PAIN (0)

## 2023-01-25 ASSESSMENT — PATIENT HEALTH QUESTIONNAIRE - PHQ9
SUM OF ALL RESPONSES TO PHQ QUESTIONS 1-9: 4
SUM OF ALL RESPONSES TO PHQ QUESTIONS 1-9: 4
10. IF YOU CHECKED OFF ANY PROBLEMS, HOW DIFFICULT HAVE THESE PROBLEMS MADE IT FOR YOU TO DO YOUR WORK, TAKE CARE OF THINGS AT HOME, OR GET ALONG WITH OTHER PEOPLE: NOT DIFFICULT AT ALL
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
## 2023-01-25 NOTE — Clinical Note
Please abstract the following data from this visit with this patient into the appropriate field in Epic:  Tests that can be patient reported without a hard copy:  Pap smear done on this date: 01/2023 (approximately), by this group: [facility], results were normal .   Other Tests found in the patient's chart through Chart Review/Care Everywhere:  {Abstract Quality List (Optional):148230}  Note to Abstraction: If this section is blank, no results were found via Chart Review/Care Everywhere.

## 2023-02-28 PROBLEM — M54.2 CERVICALGIA: Status: RESOLVED | Noted: 2022-04-12 | Resolved: 2023-02-28

## 2023-02-28 PROBLEM — G44.209 TENSION HEADACHE: Status: RESOLVED | Noted: 2022-04-12 | Resolved: 2023-02-28
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
## 2023-04-16 ENCOUNTER — E-VISIT (OUTPATIENT)
Dept: URGENT CARE | Facility: CLINIC | Age: 53
End: 2023-04-16
Payer: COMMERCIAL

## 2023-04-16 DIAGNOSIS — R05.1 ACUTE COUGH: Primary | ICD-10-CM

## 2023-04-16 PROCEDURE — 99421 OL DIG E/M SVC 5-10 MIN: CPT | Performed by: INTERNAL MEDICINE

## 2023-04-16 RX ORDER — BENZONATATE 100 MG/1
100 CAPSULE ORAL 3 TIMES DAILY PRN
Qty: 30 CAPSULE | Refills: 0 | Status: SHIPPED | OUTPATIENT
Start: 2023-04-16 | End: 2023-05-31

## 2023-04-16 NOTE — PATIENT INSTRUCTIONS
Dear Janice,      Based on your responses, your symptoms are most consistent with a virus.  Antibiotics are not indicated for viral infections.  You could have COVID-19, even though you've had a couple negative home tests, as the home tests are less accurate than the PCR test.  I advise that you have a covid pcr test or continue to check for covid with home tests daily for the next two days.  I have prescription to the pharmacy for benzonatate to help with the cough.  You can use some flonase nasal spray for the nasal congestion, and tylenol or ibuprofen for the pain and aches.  If you worsen over the next 3-4 days, you should have an in person visit to evaluate your symptoms further.    Will I be tested for COVID-19?  We would like to test you for COVID. I have placed orders for this test.     To schedule: go to your [employer] home page and scroll down to the section that says  You have an appointment that needs to be scheduled  and click the large green button that says  Schedule Now  and follow the steps to find the next available openings.    If you are unable to complete these [employer] scheduling steps, please call [phone] to schedule your testing.     How do I self-isolate?  You isolate when you have symptoms of COVID or a test shows you have COVID, even if you don t have symptoms.     If you DO have symptoms:  o Stay home and away from others  - For at least 5 days after your symptoms started, AND   - You are fever free for 24 hours (with no medicine that reduces fever), AND  - Your other symptoms are better.  o Wear a mask for 10 full days any time you are around others.    If you DON T have symptoms:  o Stay at home and away from others for at least 5 days after your positive test.  o Wear a mask for 10 full days any time you are around others.    How can I take care of myself?  Over the counter medications may help with your symptoms such as runny or stuffy nose, cough, chills, or fever.  Talk to  "your care team about your options.     Some people are at high risk of severe illness (for example, you have a weak immune system, you re 50 years or older, or you have certain medical problems). If your risk is high and your symptoms started in the last 5 days, we strongly recommend for you to get COVID treatment as soon as possible.There are safe and effective medicines available that can make you feel better faster, and prevent hospitalization and death.       To discuss COVID treatment you can:    Call your clinic OR [phone] ([phone]) and ask to speak to a nurse about a positive COVID test.    Send a [employer] message to your care team. In [employer], select \"Ask a Medical Question\" then \"Do I need an appointment\" and put \"COVID\" in the subject line. Please include a phone number to call you back in the message.               Get lots of rest. Drink extra fluids (unless a doctor has told you not to)    Take Tylenol (acetaminophen) or ibuprofen for fever or pain. If you have liver or kidney problems, ask your family doctor if it's okay to take Tylenol or ibuprofen    Take over the counter medications for your symptoms, as directed by your doctor. You may also talk to your pharmacist.      If you have other health problems (like cancer, heart failure, an organ transplant or severe kidney disease): Call your specialty clinic if you don't feel better in the next 2 days.    Know when to call 911. Emergency warning signs include:  o Trouble breathing or shortness of breath  o Pain or pressure in the chest that doesn't go away  o Feeling confused like you haven't felt before, or not being able to wake up  o Bluish-colored lips or face    Where can I get more information?    [facility] - About COVID-19: [email or web address]     CDC - What to Do If You're Sick: https://www.cdc.gov/coronavirus/2019-ncov/if-you-are-sick/index.html     CDC -  Isolation " https://www.cdc.gov/coronavirus/2019-ncov/your-health/isolation.html

## 2023-05-15 ENCOUNTER — VIRTUAL VISIT (OUTPATIENT)
Dept: URGENT CARE | Facility: CLINIC | Age: 53
End: 2023-05-15
Payer: COMMERCIAL

## 2023-05-15 DIAGNOSIS — R06.02 SHORTNESS OF BREATH: Primary | ICD-10-CM

## 2023-05-15 PROCEDURE — 99207 PR NO CHARGE LOS: CPT | Mod: VID

## 2023-05-15 NOTE — PROGRESS NOTES
CC:  Worsening cough/SOB    Cold one month ago-- now still feels change in breathing  NO fever or chills.    1. Shortness of breath    Patient referred to be seen in person today.  No charge for [facility].    Carolyn Rodriguez MD  [facility]

## 2023-05-24 ENCOUNTER — TRANSFERRED RECORDS (OUTPATIENT)
Dept: HEALTH INFORMATION MANAGEMENT | Facility: CLINIC | Age: 53
End: 2023-05-24
Payer: COMMERCIAL

## 2023-05-31 ENCOUNTER — OFFICE VISIT (OUTPATIENT)
Dept: FAMILY MEDICINE | Facility: OTHER | Age: 53
End: 2023-05-31
Payer: COMMERCIAL

## 2023-05-31 ENCOUNTER — TRANSFERRED RECORDS (OUTPATIENT)
Dept: HEALTH INFORMATION MANAGEMENT | Facility: CLINIC | Age: 53
End: 2023-05-31

## 2023-05-31 ENCOUNTER — TELEPHONE (OUTPATIENT)
Dept: FAMILY MEDICINE | Facility: OTHER | Age: 53
End: 2023-05-31

## 2023-05-31 VITALS
OXYGEN SATURATION: 98 % | WEIGHT: 201 LBS | RESPIRATION RATE: 8 BRPM | SYSTOLIC BLOOD PRESSURE: 150 MMHG | HEART RATE: 85 BPM | BODY MASS INDEX: 32.3 KG/M2 | TEMPERATURE: 98.5 F | DIASTOLIC BLOOD PRESSURE: 86 MMHG | HEIGHT: 66 IN

## 2023-05-31 DIAGNOSIS — S52.501A CLOSED FRACTURE OF DISTAL ENDS OF RIGHT RADIUS AND ULNA, INITIAL ENCOUNTER: ICD-10-CM

## 2023-05-31 DIAGNOSIS — G47.01 INSOMNIA DUE TO MEDICAL CONDITION: ICD-10-CM

## 2023-05-31 DIAGNOSIS — R03.0 ELEVATED BP WITHOUT DIAGNOSIS OF HYPERTENSION: ICD-10-CM

## 2023-05-31 DIAGNOSIS — S52.501A CLOSED FRACTURE OF DISTAL END OF RIGHT RADIUS, UNSPECIFIED FRACTURE MORPHOLOGY, INITIAL ENCOUNTER: ICD-10-CM

## 2023-05-31 DIAGNOSIS — E66.3 OVERWEIGHT: ICD-10-CM

## 2023-05-31 DIAGNOSIS — F41.1 GAD (GENERALIZED ANXIETY DISORDER): ICD-10-CM

## 2023-05-31 DIAGNOSIS — S52.601A CLOSED FRACTURE OF DISTAL ENDS OF RIGHT RADIUS AND ULNA, INITIAL ENCOUNTER: ICD-10-CM

## 2023-05-31 DIAGNOSIS — G43.909 MIGRAINE WITHOUT STATUS MIGRAINOSUS, NOT INTRACTABLE, UNSPECIFIED MIGRAINE TYPE: ICD-10-CM

## 2023-05-31 DIAGNOSIS — E06.3 HASHIMOTO'S THYROIDITIS: ICD-10-CM

## 2023-05-31 DIAGNOSIS — Z01.818 PREOP GENERAL PHYSICAL EXAM: Primary | ICD-10-CM

## 2023-05-31 DIAGNOSIS — F33.1 MAJOR DEPRESSIVE DISORDER, RECURRENT EPISODE, MODERATE (H): ICD-10-CM

## 2023-05-31 LAB
ANION GAP SERPL CALCULATED.3IONS-SCNC: 10 MMOL/L (ref 7–15)
BASOPHILS # BLD AUTO: 0 10E3/UL (ref 0–0.2)
BASOPHILS NFR BLD AUTO: 0 %
BUN SERPL-MCNC: 15.1 MG/DL (ref 6–20)
CALCIUM SERPL-MCNC: 9.2 MG/DL (ref 8.6–10)
CHLORIDE SERPL-SCNC: 103 MMOL/L (ref 98–107)
CREAT SERPL-MCNC: 0.87 MG/DL (ref 0.51–0.95)
DEPRECATED HCO3 PLAS-SCNC: 25 MMOL/L (ref 22–29)
EOSINOPHIL # BLD AUTO: 0 10E3/UL (ref 0–0.7)
EOSINOPHIL NFR BLD AUTO: 1 %
ERYTHROCYTE [DISTWIDTH] IN BLOOD BY AUTOMATED COUNT: 11.8 % (ref 10–15)
GFR SERPL CREATININE-BSD FRML MDRD: 79 ML/MIN/1.73M2
GLUCOSE SERPL-MCNC: 114 MG/DL (ref 70–99)
HCT VFR BLD AUTO: 41.6 % (ref 35–47)
HGB BLD-MCNC: 14.3 G/DL (ref 11.7–15.7)
IMM GRANULOCYTES # BLD: 0 10E3/UL
IMM GRANULOCYTES NFR BLD: 0 %
LYMPHOCYTES # BLD AUTO: 2 10E3/UL (ref 0.8–5.3)
LYMPHOCYTES NFR BLD AUTO: 29 %
MCH RBC QN AUTO: 30 PG (ref 26.5–33)
MCHC RBC AUTO-ENTMCNC: 34.4 G/DL (ref 31.5–36.5)
MCV RBC AUTO: 87 FL (ref 78–100)
MONOCYTES # BLD AUTO: 0.4 10E3/UL (ref 0–1.3)
MONOCYTES NFR BLD AUTO: 6 %
NEUTROPHILS # BLD AUTO: 4.5 10E3/UL (ref 1.6–8.3)
NEUTROPHILS NFR BLD AUTO: 65 %
PLATELET # BLD AUTO: 253 10E3/UL (ref 150–450)
POTASSIUM SERPL-SCNC: 3.9 MMOL/L (ref 3.4–5.3)
RBC # BLD AUTO: 4.76 10E6/UL (ref 3.8–5.2)
SODIUM SERPL-SCNC: 138 MMOL/L (ref 136–145)
WBC # BLD AUTO: 7 10E3/UL (ref 4–11)

## 2023-05-31 PROCEDURE — 99214 OFFICE O/P EST MOD 30 MIN: CPT | Performed by: NURSE PRACTITIONER

## 2023-05-31 PROCEDURE — 93000 ELECTROCARDIOGRAM COMPLETE: CPT | Performed by: NURSE PRACTITIONER

## 2023-05-31 PROCEDURE — 36415 COLL VENOUS BLD VENIPUNCTURE: CPT | Performed by: NURSE PRACTITIONER

## 2023-05-31 PROCEDURE — 85025 COMPLETE CBC W/AUTO DIFF WBC: CPT | Performed by: NURSE PRACTITIONER

## 2023-05-31 PROCEDURE — 80048 BASIC METABOLIC PNL TOTAL CA: CPT | Performed by: NURSE PRACTITIONER

## 2023-05-31 RX ORDER — ALBUTEROL SULFATE 90 UG/1
1-2 AEROSOL, METERED RESPIRATORY (INHALATION) EVERY 4 HOURS PRN
COMMUNITY
Start: 2023-05-15 | End: 2024-04-26

## 2023-05-31 RX ORDER — IBUPROFEN 200 MG
200 TABLET ORAL EVERY 4 HOURS PRN
COMMUNITY
End: 2024-04-26

## 2023-05-31 RX ORDER — RIZATRIPTAN BENZOATE 10 MG/1
1 TABLET ORAL SEE ADMIN INSTRUCTIONS
COMMUNITY
Start: 2021-11-17

## 2023-05-31 RX ORDER — OXYCODONE HYDROCHLORIDE 5 MG/1
1 TABLET ORAL
COMMUNITY
Start: 2023-05-20 | End: 2024-04-26

## 2023-05-31 ASSESSMENT — PAIN SCALES - GENERAL: PAINLEVEL: MODERATE PAIN (4)

## 2023-05-31 NOTE — PROGRESS NOTES
[address]  [facility] [address]  Phone: [phone]  Primary Provider: Yaneth Scott  Pre-op Performing Provider: TOÑO MOONEY    PREOPERATIVE EVALUATION:  Today's date: 5/31/2023    Janice Morgan is a 52 year old female who presents for a preoperative evaluation.      5/31/2023     1:25 PM   Additional Questions   Roomed by Jo ARIZA                  Surgical Information:  Surgery/Procedure: Plates Inserted in wrist - right  Surgery Location: [facility]  Surgeon: Dr. Giorgio Mcallister  Surgery Date: 6/1/23  Time of Surgery: 14:15 PM  Where patient plans to recover: At home with family  Fax number for surgical facility: [phone]    Assessment & Plan     The proposed surgical procedure is considered INTERMEDIATE risk.    Preop general physical exam  Plans for surgical intervention for fracture   - CBC with platelets and differential; Future  - EKG 12-lead complete w/read - Clinics  - Basic metabolic panel  (Ca, Cl, CO2, Creat, Gluc, K, Na, BUN); Future    Closed fracture of distal end of right radius, unspecified fracture morphology, initial encounter    - CBC with platelets and differential; Future  - Basic metabolic panel  (Ca, Cl, CO2, Creat, Gluc, K, Na, BUN); Future    Closed fracture of distal ends of right radius and ulna, initial encounter    - Basic metabolic panel  (Ca, Cl, CO2, Creat, Gluc, K, Na, BUN); Future    Major depressive disorder, recurrent episode, moderate (H)  Stable     Migraine without status migrainosus, not intractable, unspecified migraine type  Stable on prn Maxalt. Has not taken in a couple of weeks.     Hashimoto's thyroiditis  Stable, last TSH 0.46  - EKG 12-lead complete w/read - Clinics    ADELE (generalized anxiety disorder)  Stable     Insomnia due to medical condition  Stable     Overweight  The 10-year ASCVD risk score (Carmen DK, et al., 2019) is: 2%    Values used to calculate the score:      Age: 52  years      Sex: Female      Is Non- : No      Diabetic: No      Tobacco smoker: No      Systolic Blood Pressure: 150 mmHg      Is BP treated: No      HDL Cholesterol: 59 mg/dL      Total Cholesterol: 180 mg/dL      Elevated BP without diagnosis of hypertension  Recommend monitoring, likely due to pain given   BP Readings from Last 3 Encounters:   05/31/23 (!) 150/86   01/25/23 124/88   04/12/22 138/80     Follow up with PCP if continues to have elevated blood pressures postoperatively.   - EKG 12-lead complete w/read - Clinics  - Basic metabolic panel  (Ca, Cl, CO2, Creat, Gluc, K, Na, BUN); Future        Risks and Recommendations:  The patient has the following additional risks and recommendations for perioperative complications:  Pulmonary:    - Incentive spirometry post-op   - Bring inhaler with to surgery     Antiplatelet or Anticoagulation Medication Instructions:   - Patient is on no antiplatelet or anticoagulation medications.    Additional Medication Instructions:   - Estrogens: Topiocal, has not taken in awhile and will not do any application until after surgery    - pregabalin, gabapentin: Continue without modification.   - ibuprofen (Advil, Motrin): HOLD 1 day before surgery.    - rescue Inhaler: Continue PRN. Bring to hospital on the day of surgery.  - Hold vitamin day of surgery  - Ok to take Levothyroxine day of surgery with a sip of water.     RECOMMENDATION:  APPROVAL GIVEN to proceed with proposed procedure, without further diagnostic evaluation.      6}    Subjective       HPI related to upcoming procedure:         5/31/2023     1:17 PM   Preop Questions   1. Have you ever had a heart attack or stroke? No   2. Have you ever had surgery on your heart or blood vessels, such as a stent placement, a coronary artery bypass, or surgery on an artery in your head, neck, heart, or legs? No   3. Do you have chest pain with activity? No   4. Do you have a history of  heart failure? No    5. Do you currently have a cold, bronchitis or symptoms of other infection? No   6. Do you have a cough, shortness of breath, or wheezing? No   7. Do you or anyone in your family have previous history of blood clots? YES - Father had a clot due to an accident that he had. Brother had an accident in his leg and has the same thing. Not a blood disorder. Provoked DVT's.    8. Do you or does anyone in your family have a serious bleeding problem such as prolonged bleeding following surgeries or cuts? No   9. Have you ever had problems with anemia or been told to take iron pills? No   10. Have you had any abnormal blood loss such as black, tarry or bloody stools, or abnormal vaginal bleeding? No   11. Have you ever had a blood transfusion? No   12. Are you willing to have a blood transfusion if it is medically needed before, during, or after your surgery? Yes   13. Have you or any of your relatives ever had problems with anesthesia? No   14. Do you have sleep apnea, excessive snoring or daytime drowsiness? No   15. Do you have any artifical heart valves or other implanted medical devices like a pacemaker, defibrillator, or continuous glucose monitor? No   16. Do you have artificial joints? Plate in left writs.    17. Are you allergic to latex? No   18. Is there any chance that you may be pregnant? No       Health Care Directive:  Patient does not have a Health Care Directive or Living Will: Discussed advance care planning with patient; however, patient declined at this time.    Preoperative Review of :   reviewed - controlled substances reflected in medication list.      Status of Chronic Conditions:  ASTHMA - Patient has a longstanding history of moderate-severe Asthma . Patient has been doing well overall noting NO SYMPTOMS and continues on medication regimen consisting of albuterol without adverse reactions or side effects.     HYPERTENSION - Undiagnosed, typically has had good blood pressures. In a lot of pain  today and had diagnosis of her father so dealing with a lot of stress today.     HYPOTHYROIDISM - Patient has a longstanding history of chronic Hypothyroidism. Patient has been doing well, noting no tremor, insomnia, hair loss or changes in skin texture. Continues to take medications as directed, without adverse reactions or side effects. Last TSH   Lab Results   Component Value Date    TSH 0.46 01/25/2023   .      Menopausal symptoms- Gabapentin takes it per her doctors prescribed in the evening.     Review of Systems  CONSTITUTIONAL: NEGATIVE for fever, chills, change in weight  INTEGUMENTARY/SKIN: NEGATIVE for worrisome rashes, moles or lesions  EYES: NEGATIVE for vision changes or irritation  ENT/MOUTH: NEGATIVE for ear, mouth and throat problems  RESP: NEGATIVE for significant cough or SOB  CV: NEGATIVE for chest pain, palpitations or peripheral edema  GI: NEGATIVE for nausea, abdominal pain, heartburn, or change in bowel habits  : NEGATIVE for frequency, dysuria, or hematuria  MUSCULOSKELETAL: Right hand and forearm pain   NEURO: NEGATIVE for weakness, dizziness or paresthesias  ENDOCRINE: NEGATIVE for temperature intolerance, skin/hair changes  HEME: NEGATIVE for bleeding problems  PSYCHIATRIC: NEGATIVE for changes in mood or affect    Patient Active Problem List    Diagnosis Date Noted     Insomnia due to medical condition 11/17/2021     Priority: Medium     Anxiety 02/12/2016     Priority: Medium     Menopausal syndrome (hot flashes) 01/12/2016     Priority: Medium     Overweight 01/12/2016     Priority: Medium     Major depressive disorder, recurrent episode, moderate (H) 01/12/2016     Priority: Medium     Cervical high risk HPV (human papillomavirus) test positive 10/08/2014     Priority: Medium     10/8/14: NIL pap, + HPV (not 16 or 18). Plan cotest pap & HPV in 1 year.  Tracking started.  1/2016 Pap NIL, neg HPV, cotest three years  11/30/2018 Pap: NIL/neg HPV         Nontoxic multinodular goiter  2013     Priority: Medium     Galactorrhea not associated with childbirth 2012     Priority: Medium     Migraine 2012     Priority: Medium     Moderate major depression (H) 2012     Priority: Medium     CARDIOVASCULAR SCREENING; LDL GOAL LESS THAN 160 10/31/2010     Priority: Medium     Hashimoto's thyroiditis 10/08/2010     Priority: Medium     Hypothyroidism 2010     Priority: Medium     Formatting of this note might be different from the original.  Hypothyroidism Primary        Past Medical History:   Diagnosis Date     Cervical high risk HPV (human papillomavirus) test positive 10/2014    NIL pap, + HPV (not 16 or 18) '16 HPV neg     Hashimoto's thyroiditis 10/8/2010     Past Surgical History:   Procedure Laterality Date     C ANALGESIA,EPIDURAL,LABOR &   , 2005     COLONOSCOPY WITH CO2 INSUFFLATION N/A 2/3/2021    Procedure: COLONOSCOPY, WITH CO2 INSUFFLATION;  Surgeon: Marcelo Amador MD;  Location: [facility] OR     HC EXCISION BREAST LESION, OPEN >=1  2001     LAPAROSCOPIC APPENDECTOMY  2013    Procedure: LAPAROSCOPIC APPENDECTOMY;  Laparoscopic Appendectomy;  Surgeon: Mercy Leal MD;  Location: [facility] OR     LAPAROSCOPIC CHOLECYSTECTOMY N/A 2019    Procedure: LAPAROSCOPIC CHOLECYSTECTOMY;  Surgeon: Marlene Taylor MD;  Location:  OR     Current Outpatient Medications   Medication Sig Dispense Refill     ESTRIOL 0.5MG/GM CREAM Place 1 Application vaginally nightly as needed 2-3 times per week       gabapentin (NEURONTIN) 100 MG capsule Take 100-200 mg by mouth every evening        ibuprofen (ADVIL/MOTRIN) 200 MG tablet Take 200 mg by mouth every 4 hours as needed       levothyroxine (SYNTHROID/LEVOTHROID) 175 MCG tablet        oxyCODONE (ROXICODONE) 5 MG tablet Take 1 tablet by mouth nightly as needed       rizatriptan (MAXALT) 10 MG tablet Take 1 tablet by mouth See Admin Instructions       albuterol (PROAIR HFA/PROVENTIL HFA/VENTOLIN  "HFA) 108 (90 Base) MCG/ACT inhaler Inhale 1-2 puffs into the lungs every 4 hours as needed (Patient not taking: Reported on 5/31/2023)         Allergies   Allergen Reactions     Erythromycin Hives     Other (Do Not Use) Rash     Colth tape is the one that gives her the reaction.Burn type reacton , raw.     Tape [Adhesive Tape]      Colth tape is the one that gives her the reaction.Burn type reacton , raw.     Penicillin [Penicillins]      Family alergies to the antibiotic. So it was never given to her.        Social History     Tobacco Use     Smoking status: Former     Smokeless tobacco: Never     Tobacco comments:     quit for 4 years.   Vaping Use     Vaping status: Never Used   Substance Use Topics     Alcohol use: Yes     Comment: 1-2 per month     Family History   Problem Relation Age of Onset     Asthma Mother      Psychotic Disorder Mother      Musculoskeletal Disorder Mother         fibromyalgia     Hypertension Mother      Depression Mother      Anesthesia Reaction Mother      Thyroid Disease Mother      C.A.D. Maternal Grandfather      Heart Disease Maternal Grandfather      Cerebrovascular Disease Maternal Grandfather      Diabetes Paternal Grandfather      Cancer Maternal Grandmother         lung     Other Cancer Maternal Grandmother      Cancer Paternal Grandmother         stomach     Other Cancer Paternal Grandmother      History   Drug Use No         Objective     BP (!) 150/86 (Cuff Size: Adult Regular)   Pulse 85   Temp 98.5  F (36.9  C) (Oral)   Resp (!) 8   Ht 1.665 m (5' 5.55\")   Wt 91.2 kg (201 lb)   LMP 08/12/2016   SpO2 98%   BMI 32.89 kg/m      Physical Exam    GENERAL APPEARANCE: healthy, alert and no distress     EYES: EOMI, PERRL     HENT: ear canals and TM's normal and nose and mouth without ulcers or lesions     NECK: no adenopathy, no asymmetry, masses, or scars and thyroid normal to palpation     RESP: lungs clear to auscultation - no rales, rhonchi or wheezes     CV: regular " rates and rhythm, normal S1 S2, no S3 or S4 and no murmur, click or rub     ABDOMEN:  soft, nontender, no HSM or masses and bowel sounds normal     MS: Forearm right- covered with wrap, pain. History of left arm/wrist fracture.      SKIN: no suspicious lesions or rashes     NEURO: Normal strength and tone, sensory exam grossly normal, mentation intact and speech normal     PSYCH: mentation appears normal. and affect normal/bright     LYMPHATICS: No cervical adenopathy    Recent Labs   Lab Test 01/25/23  0852      POTASSIUM 4.1   CR 0.87   A1C 5.5        Diagnostics:  Recent Results (from the past 24 hour(s))   CBC with platelets and differential    Collection Time: 05/31/23  2:40 PM   Result Value Ref Range    WBC Count 7.0 4.0 - 11.0 10e3/uL    RBC Count 4.76 3.80 - 5.20 10e6/uL    Hemoglobin 14.3 11.7 - 15.7 g/dL    Hematocrit 41.6 35.0 - 47.0 %    MCV 87 78 - 100 fL    MCH 30.0 26.5 - 33.0 pg    MCHC 34.4 31.5 - 36.5 g/dL    RDW 11.8 10.0 - 15.0 %    Platelet Count 253 150 - 450 10e3/uL    % Neutrophils 65 %    % Lymphocytes 29 %    % Monocytes 6 %    % Eosinophils 1 %    % Basophils 0 %    % Immature Granulocytes 0 %    Absolute Neutrophils 4.5 1.6 - 8.3 10e3/uL    Absolute Lymphocytes 2.0 0.8 - 5.3 10e3/uL    Absolute Monocytes 0.4 0.0 - 1.3 10e3/uL    Absolute Eosinophils 0.0 0.0 - 0.7 10e3/uL    Absolute Basophils 0.0 0.0 - 0.2 10e3/uL    Absolute Immature Granulocytes 0.0 <=0.4 10e3/uL      EKG: appears normal, NSR, normal axis, normal intervals, no acute ST/T changes c/w ischemia, no LVH by voltage criteria, unchanged from previous tracings    Revised Cardiac Risk Index (RCRI):  The patient has the following serious cardiovascular risks for perioperative complications:   - No serious cardiac risks = 0 points     RCRI Interpretation: 0 points: Class I (very low risk - 0.4% complication rate)           Signed Electronically by: HA Roca CNP  Copy of this evaluation report is provided to  requesting physician.

## 2023-05-31 NOTE — TELEPHONE ENCOUNTER
Please fax Pre-op and EKG      HA Roca CNP  Questions or concerns please feel free to send me a [employer] message or call me  Phone : [phone]

## 2023-05-31 NOTE — PATIENT INSTRUCTIONS
For informational purposes only. Not to replace the advice of your health care provider. Copyright   2003,  [facility]. All rights reserved. Clinically reviewed by Isi Farrar MD. [employer] [id number] - REV .  Preparing for Your Surgery  Getting started  A nurse will call you to review your health history and instructions. They will give you an arrival time based on your scheduled surgery time. Please be ready to share:  Your doctor's clinic name and phone number  Your medical, surgical, and anesthesia history  A list of allergies and sensitivities  A list of medicines, including herbal treatments and over-the-counter drugs  Whether the patient has a legal guardian (ask how to send us the papers in advance)  Please tell us if you're pregnant--or if there's any chance you might be pregnant. Some surgeries may injure a fetus (unborn baby), so they require a pregnancy test. Surgeries that are safe for a fetus don't always need a test, and you can choose whether to have one.   If you have a child who's having surgery, please ask for a copy of Preparing for Your Child's Surgery.    Preparing for surgery  Within 10 to 30 days of surgery: Have a pre-op exam (sometimes called an H&P, or History and Physical). This can be done at a clinic or pre-operative center.  If you're having a , you may not need this exam. Talk to your care team.  At your pre-op exam, talk to your care team about all medicines you take. If you need to stop any medicines before surgery, ask when to start taking them again.  We do this for your safety. Many medicines can make you bleed too much during surgery. Some change how well surgery (anesthesia) drugs work.  Call your insurance company to let them know you're having surgery. (If you don't have insurance, call [phone].)  Call your clinic if there's any change in your health. This includes signs of a cold or flu (sore throat, runny nose, cough, rash, fever). It  also includes a scrape or scratch near the surgery site.  If you have questions on the day of surgery, call your hospital or surgery center.  Eating and drinking guidelines  For your safety: Unless your surgeon tells you otherwise, follow the guidelines below.  Eat and drink as usual until 8 hours before you arrive for surgery. After that, no food or milk.  Drink clear liquids until 2 hours before you arrive. These are liquids you can see through, like water, Gatorade, and Propel Water. They also include plain black coffee and tea (no cream or milk), candy, and breath mints. You can spit out gum when you arrive.  If you drink alcohol: Stop drinking it the night before surgery.  If your care team tells you to take medicine on the morning of surgery, it's okay to take it with a sip of water.  Preventing infection  Shower or bathe the night before and morning of your surgery. Follow the instructions your clinic gave you. (If no instructions, use regular soap.)  Don't shave or clip hair near your surgery site. We'll remove the hair if needed.  Don't smoke or vape the morning of surgery. You may chew nicotine gum up to 2 hours before surgery. A nicotine patch is okay.  Note: Some surgeries require you to completely quit smoking and nicotine. Check with your surgeon.  Your care team will make every effort to keep you safe from infection. We will:  Clean our hands often with soap and water (or an alcohol-based hand rub).  Clean the skin at your surgery site with a special soap that kills germs.  Give you a special gown to keep you warm. (Cold raises the risk of infection.)  Wear special hair covers, masks, gowns and gloves during surgery.  Give antibiotic medicine, if prescribed. Not all surgeries need antibiotics.  What to bring on the day of surgery  Photo ID and insurance card  Copy of your health care directive, if you have one  Glasses and hearing aids (bring cases)  You can't wear contacts during surgery  Inhaler and  eye drops, if you use them (tell us about these when you arrive)  CPAP machine or breathing device, if you use them  A few personal items, if spending the night  If you have . . .  A pacemaker, ICD (cardiac defibrillator) or other implant: Bring the ID card.  An implanted stimulator: Bring the remote control.  A legal guardian: Bring a copy of the certified (court-stamped) guardianship papers.  Please remove any jewelry, including body piercings. Leave jewelry and other valuables at home.  If you're going home the day of surgery  You must have a responsible adult drive you home. They should stay with you overnight as well.  If you don't have someone to stay with you, and you aren't safe to go home alone, we may keep you overnight. Insurance often won't pay for this.  After surgery  If it's hard to control your pain or you need more pain medicine, please call your surgeon's office.  Questions?   If you have any questions for your care team, list them here: _________________________________________________________________________________________________________________________________________________________________________ ____________________________________ ____________________________________ ____________________________________    How to Take Your Medication Before Surgery  - HOLD (do not take) NSAIDS or Asprin now until after surgery as directed by your surgeon.   - STOP taking all vitamins and herbal supplements 14 days before surgery.  - Take your levothyroxine with a sip of water in the morning. Ok to take your Gabapentin as needed.   - Tylenol or oxycodone for pain  -Hold topical estrogen.

## 2023-06-12 ENCOUNTER — TRANSFERRED RECORDS (OUTPATIENT)
Dept: HEALTH INFORMATION MANAGEMENT | Facility: CLINIC | Age: 53
End: 2023-06-12
Payer: COMMERCIAL

## 2023-07-05 ENCOUNTER — TRANSFERRED RECORDS (OUTPATIENT)
Dept: HEALTH INFORMATION MANAGEMENT | Facility: CLINIC | Age: 53
End: 2023-07-05
Payer: COMMERCIAL

## 2023-08-24 ENCOUNTER — APPOINTMENT (OUTPATIENT)
Dept: URBAN - METROPOLITAN AREA CLINIC 259 | Age: 53
Setting detail: DERMATOLOGY
End: 2023-08-25

## 2023-08-24 DIAGNOSIS — Z71.89 OTHER SPECIFIED COUNSELING: ICD-10-CM

## 2023-08-24 DIAGNOSIS — D49.2 NEOPLASM OF UNSPECIFIED BEHAVIOR OF BONE, SOFT TISSUE, AND SKIN: ICD-10-CM

## 2023-08-24 PROCEDURE — OTHER COUNSELING: OTHER

## 2023-08-24 PROCEDURE — 11102 TANGNTL BX SKIN SINGLE LES: CPT

## 2023-08-24 PROCEDURE — OTHER MIPS QUALITY: OTHER

## 2023-08-24 PROCEDURE — OTHER BIOPSY BY SHAVE METHOD: OTHER

## 2023-08-24 PROCEDURE — 99202 OFFICE O/P NEW SF 15 MIN: CPT | Mod: 25

## 2023-08-24 ASSESSMENT — LOCATION DETAILED DESCRIPTION DERM
LOCATION DETAILED: RIGHT MID-UPPER BACK
LOCATION DETAILED: RIGHT PROXIMAL POSTERIOR UPPER ARM

## 2023-08-24 ASSESSMENT — LOCATION SIMPLE DESCRIPTION DERM
LOCATION SIMPLE: RIGHT UPPER BACK
LOCATION SIMPLE: RIGHT POSTERIOR UPPER ARM

## 2023-08-24 ASSESSMENT — LOCATION ZONE DERM
LOCATION ZONE: ARM
LOCATION ZONE: TRUNK

## 2023-08-24 NOTE — HPI: SKIN LESION
What Type Of Note Output Would You Prefer (Optional)?: Standard Output
How Severe Is Your Skin Lesion?: mild
Has Your Skin Lesion Been Treated?: not been treated
Is This A New Presentation, Or A Follow-Up?: Mole
Additional History: Pt states the mole has been changing shape and she would like it to be checked today.

## 2023-09-08 ENCOUNTER — APPOINTMENT (OUTPATIENT)
Dept: URBAN - METROPOLITAN AREA CLINIC 259 | Age: 53
Setting detail: DERMATOLOGY
End: 2023-09-10

## 2023-09-08 DIAGNOSIS — L81.4 OTHER MELANIN HYPERPIGMENTATION: ICD-10-CM

## 2023-09-08 DIAGNOSIS — L82.1 OTHER SEBORRHEIC KERATOSIS: ICD-10-CM

## 2023-09-08 DIAGNOSIS — L72.0 EPIDERMAL CYST: ICD-10-CM

## 2023-09-08 DIAGNOSIS — D18.0 HEMANGIOMA: ICD-10-CM

## 2023-09-08 DIAGNOSIS — D49.2 NEOPLASM OF UNSPECIFIED BEHAVIOR OF BONE, SOFT TISSUE, AND SKIN: ICD-10-CM

## 2023-09-08 DIAGNOSIS — L57.8 OTHER SKIN CHANGES DUE TO CHRONIC EXPOSURE TO NONIONIZING RADIATION: ICD-10-CM

## 2023-09-08 DIAGNOSIS — Z71.89 OTHER SPECIFIED COUNSELING: ICD-10-CM

## 2023-09-08 DIAGNOSIS — L73.8 OTHER SPECIFIED FOLLICULAR DISORDERS: ICD-10-CM

## 2023-09-08 DIAGNOSIS — D22 MELANOCYTIC NEVI: ICD-10-CM

## 2023-09-08 PROBLEM — D18.01 HEMANGIOMA OF SKIN AND SUBCUTANEOUS TISSUE: Status: ACTIVE | Noted: 2023-09-08

## 2023-09-08 PROBLEM — D03.61 MELANOMA IN SITU OF RIGHT UPPER LIMB, INCLUDING SHOULDER: Status: ACTIVE | Noted: 2023-09-08

## 2023-09-08 PROBLEM — D22.5 MELANOCYTIC NEVI OF TRUNK: Status: ACTIVE | Noted: 2023-09-08

## 2023-09-08 PROBLEM — D23.71 OTHER BENIGN NEOPLASM OF SKIN OF RIGHT LOWER LIMB, INCLUDING HIP: Status: ACTIVE | Noted: 2023-09-08

## 2023-09-08 PROBLEM — D23.39 OTHER BENIGN NEOPLASM OF SKIN OF OTHER PARTS OF FACE: Status: ACTIVE | Noted: 2023-09-08

## 2023-09-08 PROCEDURE — 99213 OFFICE O/P EST LOW 20 MIN: CPT | Mod: 25

## 2023-09-08 PROCEDURE — OTHER DEFER: OTHER

## 2023-09-08 PROCEDURE — 11102 TANGNTL BX SKIN SINGLE LES: CPT | Mod: 59

## 2023-09-08 PROCEDURE — OTHER EXCISION: OTHER

## 2023-09-08 PROCEDURE — OTHER MIPS QUALITY: OTHER

## 2023-09-08 PROCEDURE — 12032 INTMD RPR S/A/T/EXT 2.6-7.5: CPT | Mod: 59

## 2023-09-08 PROCEDURE — OTHER BIOPSY BY SHAVE METHOD: OTHER

## 2023-09-08 PROCEDURE — OTHER COUNSELING: OTHER

## 2023-09-08 PROCEDURE — 11602 EXC TR-EXT MAL+MARG 1.1-2 CM: CPT

## 2023-09-08 ASSESSMENT — LOCATION DETAILED DESCRIPTION DERM
LOCATION DETAILED: LEFT MID-UPPER BACK
LOCATION DETAILED: LEFT SUPERIOR LATERAL MIDBACK
LOCATION DETAILED: LEFT LATERAL UPPER BACK
LOCATION DETAILED: RIGHT SUPERIOR UPPER BACK
LOCATION DETAILED: LEFT SUPERIOR UPPER BACK
LOCATION DETAILED: LEFT MID TEMPLE
LOCATION DETAILED: RIGHT SUPERIOR MEDIAL UPPER BACK
LOCATION DETAILED: RIGHT SUPERIOR MEDIAL MIDBACK
LOCATION DETAILED: INFERIOR THORACIC SPINE

## 2023-09-08 ASSESSMENT — LOCATION ZONE DERM
LOCATION ZONE: FACE
LOCATION ZONE: TRUNK

## 2023-09-08 ASSESSMENT — LOCATION SIMPLE DESCRIPTION DERM
LOCATION SIMPLE: RIGHT UPPER BACK
LOCATION SIMPLE: UPPER BACK
LOCATION SIMPLE: RIGHT LOWER BACK
LOCATION SIMPLE: LEFT LOWER BACK
LOCATION SIMPLE: LEFT TEMPLE
LOCATION SIMPLE: LEFT UPPER BACK

## 2023-09-08 NOTE — HPI: FULL BODY SKIN EXAMINATION
How Severe Are Your Spot(S)?: mild
What Type Of Note Output Would You Prefer (Optional)?: Standard Output
What Is The Reason For Today's Visit?: Full Body Skin Examination
What Is The Reason For Today's Visit? (Being Monitored For X): concerning skin lesions on a periodic basis
Additional History: Pt states she hasn’t noticed any new or changing spots.

## 2023-09-08 NOTE — PROCEDURE: EXCISION
Subjective:      Patient ID: Severiano Corona is a 66 y.o. female who presents today for:  Chief Complaint   Patient presents with    Pre-op Exam     Presents today for Surgical Clearance. Sidney Casper reports she has left hip surgery scheduled with Dr. Jack Gonzalez on 1-2-2018. Has Pre admission testing set up tomorrow at [facility] Maintenance     HM reviewed with patient. Declines flu vaccine. Has not had TDAP       HPI     Surgical Clearance     The patient is being seen at the request of the surgeon for a preoperative evaluation. NEED FOR SURGERY IS routine. Isidro Gonzalez - left total hip replacement planned 01/02/2018  ANESTHESIA REACTIONS  - Patient denies any prior history of reactions to anesthesia, Patient denies any family history of reactions to anesthesia  INFECTION  - Patient denies cough, dysuria, fevers, chills, or any other infectious disease complaints   BLEEDING - Patient currently denies any source of blood loss (epistaxis, hematuria, hematochezia, hematemesis, or melena). Patient denies any bleeding tendencies, bruising, or bleeding gums. Patient denies any family history of bleeding dyscrasias. PULMONARY - Patient denies cough, sputum production, fevers   CARDIAC  - patient denies chest pain, dyspnea, orthopnea, edema, and cardiac arrhythmias   MAJOR CLINICAL PREDICTORS - none (no unstable coronary syndromes, decompensated HF, significant arrhythmias, or severe valvular disease). INTERMEDIATE CLINICAL PREDICTORS - none (no angina, prior MI, HF, diabetes, or renal insufficiency). MINOR CLINICAL PREDICTORS - advanced age and uncontrolled systemic hypertension. SURGICAL RISK  - Intermediate (carotid endarterectomy, head and neck surgery, intraabdominal and intrathoracic procedures, orthopedic surgeries, prostate surgery).    FUNCTIONAL CAPACITY - 3 to 6 METs (moderate intensity physical activity such as swimming, scrubbing floors, and walking dane)           Past Medical History:   Diagnosis Date    Abscess of abdominal wall 11/5/2016    Bleeding hemorrhoid 3/17/2015    Breast cancer, right ([address] 2003    s/p partial mastectomy, radiation    Carbuncle of abdominal wall 11/5/2016    Ductal carcinoma in situ of breast     right    Generalized osteoarthritis 8/4/2014    GERD (gastroesophageal reflux disease)     History of breast cancer in female 8/4/2014    HTN (hypertension) 8/4/2014    Hyperlipidemia     Hypertension     Hypothyroidism 12/2/2016    Insomnia 6/16/2015    MRSA (methicillin resistant Staphylococcus aureus) infection     MRSA infection 11/2016    LLQ abdominal wall    Obesity     Postmenopausal 2/9/2017    Scoliosis     Subclinical hypothyroidism 5/6/2014    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      Past Surgical History:   Procedure Laterality Date    ABSCESS DRAINAGE Left 11/2016    Abscess abdominal wall LLQ    BREAST BIOPSY Right 08/10/2017    DR LUIS    BREAST SURGERY N/A 11/6/2016    ABDOMINAL INCISION AND DRAINAGE performed by Alex Gambino MD at [address] [place] [address]  04/15/2005    COLONOSCOPY  05/19/2009    DR Yady Parker    COLONOSCOPY  09/18/2014    DR HATCH    DILATION AND CURETTAGE OF UTERUS      twice    HERNIA REPAIR      DR Viet Arango    JOINT REPLACEMENT Left 2006    DR SANCHEZ    MASTECTOMY, PARTIAL Right 2003    UPPER GASTROINTESTINAL ENDOSCOPY       Family History   Problem Relation Age of Onset    Heart Disease Father     Heart Attack Father     Diabetes Mother      Social History     Social History    Marital status:      Spouse name: Marlyn Sharpe Number of children: 2    Years of education: 12     Occupational History    Retired      former teacher     Social History Main Topics    Smoking status: Former Smoker     Packs/day: 1.50     Years: 21.00     Types: Cigarettes     Start date: 1959     Quit date: 1980    Smokeless tobacco: Never Used    Alcohol use Yes      Comment: infrequent  Drug use: No    Sexual activity: No      Comment: monogamous     Other Topics Concern    Not on file     Social History Narrative    Living with spouse     Current Outpatient Prescriptions on File Prior to Visit   Medication Sig Dispense Refill    oxyCODONE-acetaminophen (PERCOCET) 5-325 MG per tablet take 1/2 tablet by mouth three times a day if needed for pain  0    triamterene-hydrochlorothiazide (DYAZIDE) 37.5-25 MG per capsule Take 1 capsule by mouth daily 90 capsule 3    levothyroxine (SYNTHROID) 50 MCG tablet Take 1 tablet by mouth Daily 90 tablet 3    docusate (COLACE, DULCOLAX) 100 MG CAPS Take 100 mg by mouth daily 30 capsule 3    Multiple Vitamin (MULTI-VITAMIN PO) Take 1 tablet by mouth daily. No current facility-administered medications on file prior to visit. Allergies:  Fenofibrate; Statins; and Pcn [penicillins]    Review of Systems   Constitutional: Negative for appetite change, chills, diaphoresis, fatigue, fever and unexpected weight change. HENT: Negative for congestion, ear pain, nosebleeds, postnasal drip, sinus pain, sinus pressure and sore throat. Eyes: Negative for visual disturbance. Respiratory: Negative for cough, chest tightness, shortness of breath and wheezing. Cardiovascular: Negative for chest pain, palpitations and leg swelling. No orthopnea, No PND   Gastrointestinal: Negative for abdominal pain, anal bleeding, blood in stool, constipation, diarrhea, nausea and vomiting. No heartburn, No melena   Endocrine: Negative for cold intolerance, heat intolerance, polydipsia, polyphagia and polyuria. Genitourinary: Negative for dysuria, frequency, hematuria and urgency. Musculoskeletal: Positive for arthralgias. Skin: Negative for rash. Neurological: Negative for dizziness, syncope, weakness, light-headedness, numbness and headaches. Psychiatric/Behavioral: Negative for dysphoric mood. The patient is not nervous/anxious. Zygomaticofacial Flap Text: Given the location of the defect, shape of the defect and the proximity to free margins a zygomaticofacial flap was deemed most appropriate for repair. Using a sterile surgical marker, the appropriate flap was drawn incorporating the defect and placing the expected incisions within the relaxed skin tension lines where possible. The area thus outlined was incised deep to adipose tissue with a #15 scalpel blade with preservation of a vascular pedicle.  The skin margins were undermined to an appropriate distance in all directions utilizing iris scissors. The flap was then carried over into the defect and anchored with interrupted buried subcutaneous sutures.

## 2023-09-08 NOTE — PROCEDURE: EXCISION
Patient may also get more relieve from Ibuprofen and can take that as well. She can try placing cold compress to the area as well. She should be able to go to work tomorrow as she should start to feel better over the course of today.   Retention Suture Bite Size: 3 mm

## 2023-09-08 NOTE — PROCEDURE: EXCISION
No deformities present Trilobed Flap Text: The defect edges were debeveled with a #15 scalpel blade. Given the location of the defect and the proximity to free margins a trilobed flap was deemed most appropriate. Using a sterile surgical marker, an appropriate trilobed flap was drawn around the defect. The area thus outlined was incised deep to adipose tissue with a #15 scalpel blade. The skin margins were undermined to an appropriate distance in all directions utilizing iris scissors. Following this, the designed flap was carried into the primary defect and sutured into place.

## 2023-09-08 NOTE — PROCEDURE: EXCISION
[Hypertension] : ~T hypertension [Diabetes] : diabetes mellitus [Snoring] : snoring [Nonrestorative Sleep] : nonrestorative sleep [Negative] : Psychiatric Epidermal Closure Graft Donor Site (Optional): simple interrupted

## 2023-09-12 ENCOUNTER — APPOINTMENT (OUTPATIENT)
Dept: URBAN - METROPOLITAN AREA CLINIC 259 | Age: 53
Setting detail: DERMATOLOGY
End: 2023-09-12

## 2023-09-12 DIAGNOSIS — Z48.817 ENCOUNTER FOR SURGICAL AFTERCARE FOLLOWING SURGERY ON THE SKIN AND SUBCUTANEOUS TISSUE: ICD-10-CM

## 2023-09-12 PROCEDURE — OTHER ADDITIONAL NOTES: OTHER

## 2023-09-12 NOTE — PROCEDURE: ADDITIONAL NOTES
Render Risk Assessment In Note?: no
Additional Notes: SLP looked at wound and agreed wound looked great with no concerns. Wound was cleaned and bandaged. Pt advised to continue wound care until stitches are removed.
Detail Level: Simple

## 2023-09-18 ENCOUNTER — RX ONLY (RX ONLY)
Age: 53
End: 2023-09-18

## 2023-09-18 RX ORDER — DOXYCYCLINE HYCLATE 100 MG/1
CAPSULE, GELATIN COATED ORAL
Qty: 20 | Refills: 0 | Status: ERX | COMMUNITY
Start: 2023-09-18

## 2023-09-18 RX ORDER — HYDROCORTISONE 25 MG/G
OINTMENT TOPICAL
Qty: 20 | Refills: 0 | Status: ERX | COMMUNITY
Start: 2023-09-18

## 2023-09-20 ENCOUNTER — APPOINTMENT (OUTPATIENT)
Dept: URBAN - METROPOLITAN AREA CLINIC 259 | Age: 53
Setting detail: DERMATOLOGY
End: 2023-09-22

## 2023-09-20 DIAGNOSIS — Z48.817 ENCOUNTER FOR SURGICAL AFTERCARE FOLLOWING SURGERY ON THE SKIN AND SUBCUTANEOUS TISSUE: ICD-10-CM

## 2023-09-20 PROCEDURE — OTHER ADDITIONAL NOTES: OTHER

## 2023-09-20 PROCEDURE — OTHER MIPS QUALITY: OTHER

## 2023-09-20 PROCEDURE — 99212 OFFICE O/P EST SF 10 MIN: CPT

## 2023-09-20 PROCEDURE — OTHER COUNSELING: OTHER

## 2023-09-20 NOTE — PROCEDURE: ADDITIONAL NOTES
Detail Level: Simple
Additional Notes: Pt will RTC on Friday to remove sutures and have steri strips applied to wound before she goes on a trip out of the country on Monday. Pt decline wound change because she will put on hydrocortisone 2.5% cream and change bandage at home after the appointment today.
Render Risk Assessment In Note?: no

## 2023-09-22 ENCOUNTER — APPOINTMENT (OUTPATIENT)
Dept: URBAN - METROPOLITAN AREA CLINIC 259 | Age: 53
Setting detail: DERMATOLOGY
End: 2023-09-22

## 2023-09-22 DIAGNOSIS — Z48.02 ENCOUNTER FOR REMOVAL OF SUTURES: ICD-10-CM

## 2023-09-22 PROCEDURE — 99024 POSTOP FOLLOW-UP VISIT: CPT

## 2023-09-22 PROCEDURE — OTHER SUTURE REMOVAL (GLOBAL PERIOD): OTHER

## 2023-09-22 ASSESSMENT — LOCATION SIMPLE DESCRIPTION DERM: LOCATION SIMPLE: RIGHT POSTERIOR UPPER ARM

## 2023-09-22 ASSESSMENT — LOCATION DETAILED DESCRIPTION DERM: LOCATION DETAILED: RIGHT PROXIMAL POSTERIOR UPPER ARM

## 2023-09-22 ASSESSMENT — LOCATION ZONE DERM: LOCATION ZONE: ARM

## 2023-09-22 NOTE — PROCEDURE: SUTURE REMOVAL (GLOBAL PERIOD)
Detail Level: Detailed
Add 21489 Cpt? (Important Note: In 2017 The Use Of 83498 Is Being Tracked By Cms To Determine Future Global Period Reimbursement For Global Periods): yes

## 2023-12-11 ENCOUNTER — APPOINTMENT (OUTPATIENT)
Dept: URBAN - METROPOLITAN AREA CLINIC 259 | Age: 53
Setting detail: DERMATOLOGY
End: 2023-12-11

## 2023-12-11 DIAGNOSIS — Z86.006 PERSONAL HISTORY OF MELANOMA IN-SITU: ICD-10-CM

## 2023-12-11 DIAGNOSIS — L81.4 OTHER MELANIN HYPERPIGMENTATION: ICD-10-CM

## 2023-12-11 DIAGNOSIS — D18.0 HEMANGIOMA: ICD-10-CM

## 2023-12-11 DIAGNOSIS — D22 MELANOCYTIC NEVI: ICD-10-CM

## 2023-12-11 DIAGNOSIS — L82.1 OTHER SEBORRHEIC KERATOSIS: ICD-10-CM

## 2023-12-11 DIAGNOSIS — L57.8 OTHER SKIN CHANGES DUE TO CHRONIC EXPOSURE TO NONIONIZING RADIATION: ICD-10-CM

## 2023-12-11 DIAGNOSIS — Z71.89 OTHER SPECIFIED COUNSELING: ICD-10-CM

## 2023-12-11 PROBLEM — D18.01 HEMANGIOMA OF SKIN AND SUBCUTANEOUS TISSUE: Status: ACTIVE | Noted: 2023-12-11

## 2023-12-11 PROBLEM — D22.5 MELANOCYTIC NEVI OF TRUNK: Status: ACTIVE | Noted: 2023-12-11

## 2023-12-11 PROBLEM — D23.71 OTHER BENIGN NEOPLASM OF SKIN OF RIGHT LOWER LIMB, INCLUDING HIP: Status: ACTIVE | Noted: 2023-12-11

## 2023-12-11 PROCEDURE — OTHER MIPS QUALITY: OTHER

## 2023-12-11 PROCEDURE — 99213 OFFICE O/P EST LOW 20 MIN: CPT

## 2023-12-11 PROCEDURE — OTHER COUNSELING: OTHER

## 2023-12-11 ASSESSMENT — LOCATION ZONE DERM
LOCATION ZONE: TRUNK
LOCATION ZONE: ARM

## 2023-12-11 ASSESSMENT — LOCATION SIMPLE DESCRIPTION DERM
LOCATION SIMPLE: LEFT UPPER BACK
LOCATION SIMPLE: RIGHT POSTERIOR UPPER ARM

## 2023-12-11 ASSESSMENT — LOCATION DETAILED DESCRIPTION DERM
LOCATION DETAILED: RIGHT PROXIMAL POSTERIOR UPPER ARM
LOCATION DETAILED: LEFT SUPERIOR MEDIAL UPPER BACK
LOCATION DETAILED: LEFT MEDIAL UPPER BACK

## 2023-12-11 NOTE — HPI: FULL BODY SKIN EXAMINATION
How Severe Are Your Spot(S)?: mild
What Type Of Note Output Would You Prefer (Optional)?: Standard Output
What Is The Reason For Today's Visit?: Full Body Skin Examination
What Is The Reason For Today's Visit? (Being Monitored For X): concerning skin lesions on a periodic basis
Additional History: Pt states that she hasn’t noticed any new or changing spots on her skin.

## 2024-02-28 ENCOUNTER — HOSPITAL ENCOUNTER (OUTPATIENT)
Dept: MAMMOGRAPHY | Facility: CLINIC | Age: 54
Discharge: HOME | End: 2024-02-28
Attending: FAMILY MEDICINE | Admitting: FAMILY MEDICINE
Payer: COMMERCIAL

## 2024-02-28 DIAGNOSIS — Z12.31 VISIT FOR SCREENING MAMMOGRAM: ICD-10-CM

## 2024-02-28 PROCEDURE — 77067 SCR MAMMO BI INCL CAD: CPT

## 2024-03-11 ENCOUNTER — APPOINTMENT (OUTPATIENT)
Dept: URBAN - METROPOLITAN AREA CLINIC 259 | Age: 54
Setting detail: DERMATOLOGY
End: 2024-03-11

## 2024-03-11 DIAGNOSIS — L81.4 OTHER MELANIN HYPERPIGMENTATION: ICD-10-CM

## 2024-03-11 DIAGNOSIS — L57.8 OTHER SKIN CHANGES DUE TO CHRONIC EXPOSURE TO NONIONIZING RADIATION: ICD-10-CM

## 2024-03-11 DIAGNOSIS — D22 MELANOCYTIC NEVI: ICD-10-CM

## 2024-03-11 DIAGNOSIS — D18.0 HEMANGIOMA: ICD-10-CM

## 2024-03-11 DIAGNOSIS — L82.1 OTHER SEBORRHEIC KERATOSIS: ICD-10-CM

## 2024-03-11 DIAGNOSIS — Z71.89 OTHER SPECIFIED COUNSELING: ICD-10-CM

## 2024-03-11 DIAGNOSIS — Z86.006 PERSONAL HISTORY OF MELANOMA IN-SITU: ICD-10-CM

## 2024-03-11 PROBLEM — D22.5 MELANOCYTIC NEVI OF TRUNK: Status: ACTIVE | Noted: 2024-03-11

## 2024-03-11 PROBLEM — D18.01 HEMANGIOMA OF SKIN AND SUBCUTANEOUS TISSUE: Status: ACTIVE | Noted: 2024-03-11

## 2024-03-11 PROBLEM — D23.71 OTHER BENIGN NEOPLASM OF SKIN OF RIGHT LOWER LIMB, INCLUDING HIP: Status: ACTIVE | Noted: 2024-03-11

## 2024-03-11 PROBLEM — D23.72 OTHER BENIGN NEOPLASM OF SKIN OF LEFT LOWER LIMB, INCLUDING HIP: Status: ACTIVE | Noted: 2024-03-11

## 2024-03-11 PROCEDURE — OTHER ADDITIONAL NOTES: OTHER

## 2024-03-11 PROCEDURE — OTHER MIPS QUALITY: OTHER

## 2024-03-11 PROCEDURE — OTHER COUNSELING: OTHER

## 2024-03-11 PROCEDURE — 99213 OFFICE O/P EST LOW 20 MIN: CPT

## 2024-03-11 ASSESSMENT — LOCATION DETAILED DESCRIPTION DERM
LOCATION DETAILED: LEFT MEDIAL UPPER BACK
LOCATION DETAILED: LEFT SUPERIOR MEDIAL UPPER BACK
LOCATION DETAILED: RIGHT PROXIMAL POSTERIOR UPPER ARM

## 2024-03-11 ASSESSMENT — LOCATION SIMPLE DESCRIPTION DERM
LOCATION SIMPLE: RIGHT POSTERIOR UPPER ARM
LOCATION SIMPLE: LEFT UPPER BACK

## 2024-03-11 ASSESSMENT — LOCATION ZONE DERM
LOCATION ZONE: TRUNK
LOCATION ZONE: ARM

## 2024-03-11 NOTE — HPI: FULL BODY SKIN EXAMINATION
How Severe Are Your Spot(S)?: mild
What Type Of Note Output Would You Prefer (Optional)?: Standard Output
What Is The Reason For Today's Visit?: Full Body Skin Examination
What Is The Reason For Today's Visit? (Being Monitored For X): concerning skin lesions on a periodic basis
Additional History: Patient denies concern of skin lesions. Patient presents for evaluation and management.

## 2024-03-11 NOTE — PROCEDURE: ADDITIONAL NOTES
Additional Notes: Briefly discussed treatment options for scar including skin pen, silicone gel, and vitamin E.
Detail Level: Simple
Render Risk Assessment In Note?: no

## 2024-04-20 ENCOUNTER — HEALTH MAINTENANCE LETTER (OUTPATIENT)
Age: 54
End: 2024-04-20

## 2024-04-26 ENCOUNTER — OFFICE VISIT (OUTPATIENT)
Dept: FAMILY MEDICINE | Facility: CLINIC | Age: 54
End: 2024-04-26
Payer: COMMERCIAL

## 2024-04-26 VITALS
HEIGHT: 66 IN | BODY MASS INDEX: 32.83 KG/M2 | RESPIRATION RATE: 18 BRPM | HEART RATE: 83 BPM | TEMPERATURE: 97.5 F | DIASTOLIC BLOOD PRESSURE: 80 MMHG | OXYGEN SATURATION: 98 % | WEIGHT: 204.3 LBS | SYSTOLIC BLOOD PRESSURE: 138 MMHG

## 2024-04-26 DIAGNOSIS — M79.672 PAIN IN BOTH FEET: ICD-10-CM

## 2024-04-26 DIAGNOSIS — J30.2 SEASONAL ALLERGIC RHINITIS, UNSPECIFIED TRIGGER: ICD-10-CM

## 2024-04-26 DIAGNOSIS — M79.671 PAIN IN BOTH FEET: ICD-10-CM

## 2024-04-26 DIAGNOSIS — F33.1 MAJOR DEPRESSIVE DISORDER, RECURRENT EPISODE, MODERATE (H): ICD-10-CM

## 2024-04-26 DIAGNOSIS — N95.1 MENOPAUSAL SYNDROME (HOT FLASHES): ICD-10-CM

## 2024-04-26 DIAGNOSIS — Z83.3 FAMILY HISTORY OF DIABETES MELLITUS: ICD-10-CM

## 2024-04-26 DIAGNOSIS — Z23 ENCOUNTER FOR IMMUNIZATION: ICD-10-CM

## 2024-04-26 DIAGNOSIS — E03.9 ACQUIRED HYPOTHYROIDISM: ICD-10-CM

## 2024-04-26 DIAGNOSIS — Z85.828 HISTORY OF SKIN CANCER: ICD-10-CM

## 2024-04-26 DIAGNOSIS — Z00.00 ENCOUNTER FOR ROUTINE ADULT HEALTH EXAMINATION WITHOUT ABNORMAL FINDINGS: Primary | ICD-10-CM

## 2024-04-26 LAB
ALBUMIN SERPL BCG-MCNC: 4.5 G/DL (ref 3.5–5.2)
ALP SERPL-CCNC: 93 U/L (ref 40–150)
ALT SERPL W P-5'-P-CCNC: 23 U/L (ref 0–50)
ANION GAP SERPL CALCULATED.3IONS-SCNC: 10 MMOL/L (ref 7–15)
AST SERPL W P-5'-P-CCNC: 24 U/L (ref 0–45)
BILIRUB SERPL-MCNC: 0.4 MG/DL
BUN SERPL-MCNC: 13.2 MG/DL (ref 6–20)
CALCIUM SERPL-MCNC: 9.7 MG/DL (ref 8.6–10)
CHLORIDE SERPL-SCNC: 103 MMOL/L (ref 98–107)
CHOLEST SERPL-MCNC: 198 MG/DL
CREAT SERPL-MCNC: 0.83 MG/DL (ref 0.51–0.95)
DEPRECATED HCO3 PLAS-SCNC: 26 MMOL/L (ref 22–29)
EGFRCR SERPLBLD CKD-EPI 2021: 83 ML/MIN/1.73M2
ERYTHROCYTE [DISTWIDTH] IN BLOOD BY AUTOMATED COUNT: 11.8 % (ref 10–15)
FASTING STATUS PATIENT QL REPORTED: YES
FERRITIN SERPL-MCNC: 129 NG/ML (ref 11–328)
GLUCOSE SERPL-MCNC: 101 MG/DL (ref 70–99)
HBA1C MFR BLD: 5.4 % (ref 0–5.6)
HBV SURFACE AB SERPL IA-ACNC: <3.5 M[IU]/ML
HBV SURFACE AB SERPL IA-ACNC: NONREACTIVE M[IU]/ML
HCT VFR BLD AUTO: 43.7 % (ref 35–47)
HDLC SERPL-MCNC: 56 MG/DL
HGB BLD-MCNC: 14.8 G/DL (ref 11.7–15.7)
LDLC SERPL CALC-MCNC: 118 MG/DL
MCH RBC QN AUTO: 29.5 PG (ref 26.5–33)
MCHC RBC AUTO-ENTMCNC: 33.9 G/DL (ref 31.5–36.5)
MCV RBC AUTO: 87 FL (ref 78–100)
NONHDLC SERPL-MCNC: 142 MG/DL
PLATELET # BLD AUTO: 232 10E3/UL (ref 150–450)
POTASSIUM SERPL-SCNC: 3.9 MMOL/L (ref 3.4–5.3)
PROT SERPL-MCNC: 7.3 G/DL (ref 6.4–8.3)
RBC # BLD AUTO: 5.01 10E6/UL (ref 3.8–5.2)
SODIUM SERPL-SCNC: 139 MMOL/L (ref 135–145)
TRIGL SERPL-MCNC: 120 MG/DL
VIT B12 SERPL-MCNC: 645 PG/ML (ref 232–1245)
WBC # BLD AUTO: 6.5 10E3/UL (ref 4–11)

## 2024-04-26 PROCEDURE — 86706 HEP B SURFACE ANTIBODY: CPT | Performed by: FAMILY MEDICINE

## 2024-04-26 PROCEDURE — 99213 OFFICE O/P EST LOW 20 MIN: CPT | Mod: 25 | Performed by: FAMILY MEDICINE

## 2024-04-26 PROCEDURE — 80053 COMPREHEN METABOLIC PANEL: CPT | Performed by: FAMILY MEDICINE

## 2024-04-26 PROCEDURE — 80061 LIPID PANEL: CPT | Performed by: FAMILY MEDICINE

## 2024-04-26 PROCEDURE — 99396 PREV VISIT EST AGE 40-64: CPT | Performed by: FAMILY MEDICINE

## 2024-04-26 PROCEDURE — 85027 COMPLETE CBC AUTOMATED: CPT | Performed by: FAMILY MEDICINE

## 2024-04-26 PROCEDURE — 82607 VITAMIN B-12: CPT | Performed by: FAMILY MEDICINE

## 2024-04-26 PROCEDURE — 36415 COLL VENOUS BLD VENIPUNCTURE: CPT | Performed by: FAMILY MEDICINE

## 2024-04-26 PROCEDURE — 83036 HEMOGLOBIN GLYCOSYLATED A1C: CPT | Performed by: FAMILY MEDICINE

## 2024-04-26 PROCEDURE — 82728 ASSAY OF FERRITIN: CPT | Performed by: FAMILY MEDICINE

## 2024-04-26 SDOH — HEALTH STABILITY: PHYSICAL HEALTH: ON AVERAGE, HOW MANY DAYS PER WEEK DO YOU ENGAGE IN MODERATE TO STRENUOUS EXERCISE (LIKE A BRISK WALK)?: 2 DAYS

## 2024-04-26 ASSESSMENT — ANXIETY QUESTIONNAIRES
1. FEELING NERVOUS, ANXIOUS, OR ON EDGE: MORE THAN HALF THE DAYS
7. FEELING AFRAID AS IF SOMETHING AWFUL MIGHT HAPPEN: SEVERAL DAYS
GAD7 TOTAL SCORE: 11
5. BEING SO RESTLESS THAT IT IS HARD TO SIT STILL: SEVERAL DAYS
7. FEELING AFRAID AS IF SOMETHING AWFUL MIGHT HAPPEN: SEVERAL DAYS
2. NOT BEING ABLE TO STOP OR CONTROL WORRYING: SEVERAL DAYS
IF YOU CHECKED OFF ANY PROBLEMS ON THIS QUESTIONNAIRE, HOW DIFFICULT HAVE THESE PROBLEMS MADE IT FOR YOU TO DO YOUR WORK, TAKE CARE OF THINGS AT HOME, OR GET ALONG WITH OTHER PEOPLE: SOMEWHAT DIFFICULT
6. BECOMING EASILY ANNOYED OR IRRITABLE: MORE THAN HALF THE DAYS
3. WORRYING TOO MUCH ABOUT DIFFERENT THINGS: MORE THAN HALF THE DAYS
8. IF YOU CHECKED OFF ANY PROBLEMS, HOW DIFFICULT HAVE THESE MADE IT FOR YOU TO DO YOUR WORK, TAKE CARE OF THINGS AT HOME, OR GET ALONG WITH OTHER PEOPLE?: SOMEWHAT DIFFICULT
GAD7 TOTAL SCORE: 11
4. TROUBLE RELAXING: MORE THAN HALF THE DAYS
GAD7 TOTAL SCORE: 11

## 2024-04-26 ASSESSMENT — PATIENT HEALTH QUESTIONNAIRE - PHQ9
SUM OF ALL RESPONSES TO PHQ QUESTIONS 1-9: 14
10. IF YOU CHECKED OFF ANY PROBLEMS, HOW DIFFICULT HAVE THESE PROBLEMS MADE IT FOR YOU TO DO YOUR WORK, TAKE CARE OF THINGS AT HOME, OR GET ALONG WITH OTHER PEOPLE: VERY DIFFICULT
SUM OF ALL RESPONSES TO PHQ QUESTIONS 1-9: 14

## 2024-04-26 ASSESSMENT — PAIN SCALES - GENERAL: PAINLEVEL: NO PAIN (0)

## 2024-04-26 ASSESSMENT — SOCIAL DETERMINANTS OF HEALTH (SDOH): HOW OFTEN DO YOU GET TOGETHER WITH FRIENDS OR RELATIVES?: ONCE A WEEK

## 2024-04-26 NOTE — PROGRESS NOTES
Preventive Care Visit  [facility]  Yaneth Scott MD, Family Medicine  Apr 26, 2024      Assessment & Plan     Encounter for routine adult health examination without abnormal findings  Discussed diet,calcium,exercise.Went over self breast exam.Thin prep was NOT done.Eyes and teeth UTD.No immunizations needed today.See orders below for tests ordered and screening needed.    - REVIEW OF HEALTH MAINTENANCE PROTOCOL ORDERS  - Comprehensive metabolic panel (BMP + Alb, Alk Phos, ALT, AST, Total. Bili, TP); Future  - Lipid panel reflex to direct LDL Fasting; Future  - Hepatitis B Surface Antibody; Future  - Comprehensive metabolic panel (BMP + Alb, Alk Phos, ALT, AST, Total. Bili, TP)  - Lipid panel reflex to direct LDL Fasting  - Hepatitis B Surface Antibody    Family history of diabetes mellitus  Will screen   - Hemoglobin A1c; Future  - Hemoglobin A1c    Acquired hypothyroidism  Sees endcrinology and they are testing TSH     Major depressive disorder, recurrent episode, moderate (H)  Stable currently     Seasonal allergic rhinitis, unspecified trigger  Rhinitis which is pretty much year round , started Claritin and also flonase and not much relief , we discussed and I gave her a referral for allergy to do the testing   No asthma symptoms   - Adult Allergy/Asthma  Referral; Future    Pain in both feet  Will check labs as below , if all normal , will consider podiatry referral   - Vitamin B12; Future  - CBC with platelets; Future  - Ferritin; Future  - Vitamin B12  - CBC with platelets  - Ferritin    Menopausal syndrome (hot flashes)  Pt has had a lot of menopausal symptoms, including hot flashes , night sweats , foggy brain, etc   Pt is interested in hormones check but she is definitely in menopause based on not having periods for more than a year - so hormones check is not going to prove anything . She does have an OBGYN which she has seen for preventative visits and PAP ( last one done in  "December of 2022 ) , she will schedule a visit with her GYN to discuss HRT as she is interested in this . We discussed briefly pros and cons and risks with HRT     Patient has been advised of split billing requirements and indicates understanding: Yes          BMI  Estimated body mass index is 33.48 kg/m  as calculated from the following:    Height as of this encounter: 1.664 m (5' 5.5\").    Weight as of this encounter: 92.7 kg (204 lb 4.8 oz).       Counseling  Appropriate preventive services were discussed with this patient, including applicable screening as appropriate for fall prevention, nutrition, physical activity, Tobacco-use cessation, weight loss and cognition.  Checklist reviewing preventive services available has been given to the patient.  Reviewed patient's diet, addressing concerns and/or questions.   She is at risk for lack of exercise and has been provided with information to increase physical activity for the benefit of her well-being.   The patient's PHQ-9 score is consistent with moderate depression. She was provided with information regarding depression.         La Nena Camacho is a 53 year old, presenting for the following:  Physical        4/26/2024     8:59 AM   Additional Questions   Roomed by Tash BOLANOS        Health Care Directive  Patient does not have a Health Care Directive or Living Will: Discussed advance care planning with patient; information given to patient to review.    HPI  1) post menopause symptoms : belly weight, stressed and depressed foggy brain , hot flashes , night sweats , constipation, intermittent  palpitations , ear ringing , emotions all over the place   2) Depression is worse pt thinks because of the menopause and medications ?   3) PND for a long time , swollen eyes , not only the in the springtime but year round  , chunk of phlegm gagging her on the back of her throat on and off , Claritin - started three months now a little bit but not a lot of improvement  " flonase too , not helping a lot   4) feet pain , bottom of feet has pain which has been going on for a while     Dermatology [facility] , skin cancer on her right upper arm that was removed and she is following up with them         2024   General Health   How would you rate your overall physical health? (!) FAIR   Feel stress (tense, anxious, or unable to sleep) To some extent   (!) STRESS CONCERN      2024   Nutrition   Three or more servings of calcium each day? Yes   Diet: Regular (no restrictions)   How many servings of fruit and vegetables per day? (!) 2-3   How many sweetened beverages each day? 0-1         2024   Exercise   Days per week of moderate/strenous exercise 2 days   (!) EXERCISE CONCERN      2024   Social Factors   Frequency of gathering with friends or relatives Once a week   Worry food won't last until get money to buy more No   Food not last or not have enough money for food? No   Do you have housing?  Yes   Are you worried about losing your housing? No   Lack of transportation? No   Unable to get utilities (heat,electricity)? No         2024   Fall Risk   Fallen 2 or more times in the past year? No   Trouble with walking or balance? No          2024   Dental   Dentist two times every year? Yes         2024   TB Screening   Were you born outside of the [place]? No       Today's PHQ-9 Score:       2024     8:37 AM   PHQ-9 SCORE   PHQ-9 Total Score MyChart 14 (Moderate depression)   PHQ-9 Total Score 14         2024   Substance Use   Alcohol more than 3/day or more than 7/wk No   Do you use any other substances recreationally? No     Social History     Tobacco Use    Smoking status: Former     Current packs/day: 0.00     Types: Cigarettes     Quit date:      Years since quittin.3    Smokeless tobacco: Never    Tobacco comments:     quit for 4 years.   Vaping Use    Vaping status: Never Used   Substance Use Topics    Alcohol use: Yes      Comment: 1-2 per month    Drug use: No             2024   Breast Cancer Screening   Family history of breast, colon, or ovarian cancer? No / Unknown         10/20/2022   LAST FHS-7 RESULTS   1st degree relative breast or ovarian cancer No   Any relative bilateral breast cancer No   Any male have breast cancer No   Any ONE woman have BOTH breast AND ovarian cancer No   Any woman with breast cancer before 50yrs No   2 or more relatives with breast AND/OR ovarian cancer No   2 or more relatives with breast AND/OR bowel cancer No        Mammogram Screening - Mammogram every 1-2 years updated in Health Maintenance based on mutual decision making        2024   STI Screening   New sexual partner(s) since last STI/HIV test? No     History of abnormal Pap smear: NO - age 30- 65 PAP every 3 years recommended        Latest Ref Rng & Units 2022     9:45 AM 2018     5:05 PM 2018     4:38 PM   PAP / HPV   PAP (Historical)    NIL    HPV 16 DNA NEG^Negative  Negative     HPV 18 DNA NEG^Negative  Negative     Other HR HPV NEG^Negative  Negative     PAP-ABSTRACT  See Scanned Document             This result is from an external source.     ASCVD Risk   The 10-year ASCVD risk score (Carmen DK, et al., 2019) is: 2.4%    Values used to calculate the score:      Age: 53 years      Sex: Female      Is Non- : No      Diabetic: No      Tobacco smoker: No      Systolic Blood Pressure: 153 mmHg      Is BP treated: No      HDL Cholesterol: 59 mg/dL      Total Cholesterol: 180 mg/dL           Reviewed and updated as needed this visit by Provider                    Past Medical History:   Diagnosis Date    Cervical high risk HPV (human papillomavirus) test positive 10/2014    NIL pap, + HPV (not 16 or 18) '16 HPV neg    Hashimoto's thyroiditis 10/8/2010     Past Surgical History:   Procedure Laterality Date    C ANALGESIA,EPIDURAL,LABOR &   , 2005    COLONOSCOPY WITH  CO2 INSUFFLATION N/A 2/3/2021    Procedure: COLONOSCOPY, WITH CO2 INSUFFLATION;  Surgeon: Marcelo Amador MD;  Location: [facility] OR    HC EXCISION BREAST LESION, OPEN >=1  2001    LAPAROSCOPIC APPENDECTOMY  2013    Procedure: LAPAROSCOPIC APPENDECTOMY;  Laparoscopic Appendectomy;  Surgeon: Mercy Leal MD;  Location: [facility] OR    LAPAROSCOPIC CHOLECYSTECTOMY N/A 2019    Procedure: LAPAROSCOPIC CHOLECYSTECTOMY;  Surgeon: Marlene Taylor MD;  Location:  OR     Lab work is in process  Labs reviewed in EPIC  BP Readings from Last 3 Encounters:   24 138/80   23 (!) 150/86   23 124/88    Wt Readings from Last 3 Encounters:   24 92.7 kg (204 lb 4.8 oz)   23 91.2 kg (201 lb)   23 90.1 kg (198 lb 11.2 oz)                  Patient Active Problem List   Diagnosis    Hashimoto's thyroiditis    CARDIOVASCULAR SCREENING; LDL GOAL LESS THAN 160    Moderate major depression (H)    Migraine    Cervical high risk HPV (human papillomavirus) test positive    Menopausal syndrome (hot flashes)    Overweight    Major depressive disorder, recurrent episode, moderate (H)    Anxiety    Hypothyroidism    Nontoxic multinodular goiter    Insomnia due to medical condition    Galactorrhea not associated with childbirth    Family history of diabetes mellitus    Seasonal allergic rhinitis, unspecified trigger    Pain in both feet     Past Surgical History:   Procedure Laterality Date    C ANALGESIA,EPIDURAL,LABOR &   , 2005    COLONOSCOPY WITH CO2 INSUFFLATION N/A 2/3/2021    Procedure: COLONOSCOPY, WITH CO2 INSUFFLATION;  Surgeon: Marcelo Amador MD;  Location: [facility] OR    HC EXCISION BREAST LESION, OPEN >=1  2001    LAPAROSCOPIC APPENDECTOMY  2013    Procedure: LAPAROSCOPIC APPENDECTOMY;  Laparoscopic Appendectomy;  Surgeon: Mercy Leal MD;  Location: [facility] OR    LAPAROSCOPIC CHOLECYSTECTOMY N/A 2019    Procedure: LAPAROSCOPIC CHOLECYSTECTOMY;   Surgeon: Marlene Taylor MD;  Location:  OR       Social History     Tobacco Use    Smoking status: Former     Current packs/day: 0.00     Types: Cigarettes     Quit date:      Years since quittin.3    Smokeless tobacco: Never    Tobacco comments:     quit for 4 years.   Substance Use Topics    Alcohol use: Yes     Comment: 1-2 per month     Family History   Problem Relation Age of Onset    Asthma Mother     Psychotic Disorder Mother     Musculoskeletal Disorder Mother         fibromyalgia    Hypertension Mother     Depression Mother     Anesthesia Reaction Mother     Thyroid Disease Mother     C.A.D. Maternal Grandfather     Heart Disease Maternal Grandfather     Cerebrovascular Disease Maternal Grandfather     Diabetes Paternal Grandfather     Cancer Maternal Grandmother         lung    Other Cancer Maternal Grandmother     Cancer Paternal Grandmother         stomach    Other Cancer Paternal Grandmother          Current Outpatient Medications   Medication Sig Dispense Refill    ESTRIOL 0.5MG/GM CREAM Place 1 Application vaginally nightly as needed 2-3 times per week      gabapentin (NEURONTIN) 100 MG capsule Take 100-200 mg by mouth every evening       levothyroxine (SYNTHROID/LEVOTHROID) 175 MCG tablet       rizatriptan (MAXALT) 10 MG tablet Take 1 tablet by mouth See Admin Instructions       Allergies   Allergen Reactions    Erythromycin Hives    Other (Do Not Use) Rash     Colth tape is the one that gives her the reaction.Burn type reacton , raw.    Tape [Adhesive Tape]      Colth tape is the one that gives her the reaction.Burn type reacton , raw.    Penicillin [Penicillins]      Family alergies to the antibiotic. So it was never given to her.     Recent Labs   Lab Test 24  1000 23  1440 23  0852 23  0852 21  0915 10/18/18  0100 17  1151   A1C 5.4  --   --  5.5 5.3  --   --    *  --   --  100 102*  --   --    HDL 56  --   --  59 53  --   --    TRIG 120   "--   --  105 124  --   --    ALT 23  --   --  21  --   --  28   CR 0.83 0.87   < > 0.87  --  0.81 0.83   GFRESTIMATED 83 79   < > 79  --  75 73   GFRESTBLACK  --   --   --   --   --  >90 89   POTASSIUM 3.9 3.9   < > 4.1  --  3.5 3.9   TSH  --   --   --  0.46  --  0.68  --     < > = values in this interval not displayed.          Review of Systems  Constitutional, HEENT, cardiovascular, pulmonary, GI, , musculoskeletal, neuro, skin, endocrine and psych systems are negative, except as otherwise noted.     Objective    Exam  BP (!) 153/94   Pulse 83   Temp 97.5  F (36.4  C) (Temporal)   Resp 18   Ht 1.664 m (5' 5.5\")   Wt 92.7 kg (204 lb 4.8 oz)   LMP 08/12/2016   SpO2 98%   Breastfeeding No   BMI 33.48 kg/m     Estimated body mass index is 33.48 kg/m  as calculated from the following:    Height as of this encounter: 1.664 m (5' 5.5\").    Weight as of this encounter: 92.7 kg (204 lb 4.8 oz).    Physical Exam  GENERAL: alert and no distress  EYES: Eyes grossly normal to inspection, PERRL and conjunctivae and sclerae normal  HENT: ear canals and TM's normal, nose and mouth without ulcers or lesions  NECK: no adenopathy, no asymmetry, masses, or scars  RESP: lungs clear to auscultation - no rales, rhonchi or wheezes  CV: regular rate and rhythm, normal S1 S2, no S3 or S4, no murmur, click or rub, no peripheral edema  ABDOMEN: soft, nontender, no hepatosplenomegaly, no masses and bowel sounds normal  MS: no gross musculoskeletal defects noted, no edema  SKIN: no suspicious lesions or rashes  NEURO: Normal strength and tone, mentation intact and speech normal  PSYCH: mentation appears normal, affect normal/bright        Signed Electronically by: Yaneth Scott MD    Answers submitted by the patient for this visit:  Patient Health Questionnaire (Submitted on 4/26/2024)  If you checked off any problems, how difficult have these problems made it for you to do your work, take care of things at home, or get along with " other people?: Very difficult  PHQ9 TOTAL SCORE: 14  ADELE-7 (Submitted on 4/26/2024)  ADELE 7 TOTAL SCORE: 11

## 2024-04-28 PROBLEM — J30.2 SEASONAL ALLERGIC RHINITIS, UNSPECIFIED TRIGGER: Status: ACTIVE | Noted: 2024-04-28

## 2024-04-28 PROBLEM — M79.671 PAIN IN BOTH FEET: Status: ACTIVE | Noted: 2024-04-28

## 2024-04-28 PROBLEM — M79.672 PAIN IN BOTH FEET: Status: ACTIVE | Noted: 2024-04-28

## 2024-04-28 PROBLEM — Z85.828 HISTORY OF SKIN CANCER: Status: ACTIVE | Noted: 2024-04-28

## 2024-07-08 ENCOUNTER — APPOINTMENT (OUTPATIENT)
Dept: URBAN - METROPOLITAN AREA CLINIC 259 | Age: 54
Setting detail: DERMATOLOGY
End: 2024-07-09

## 2024-07-08 DIAGNOSIS — L57.8 OTHER SKIN CHANGES DUE TO CHRONIC EXPOSURE TO NONIONIZING RADIATION: ICD-10-CM

## 2024-07-08 DIAGNOSIS — L82.1 OTHER SEBORRHEIC KERATOSIS: ICD-10-CM

## 2024-07-08 DIAGNOSIS — D18.0 HEMANGIOMA: ICD-10-CM

## 2024-07-08 DIAGNOSIS — Z86.006 PERSONAL HISTORY OF MELANOMA IN-SITU: ICD-10-CM

## 2024-07-08 DIAGNOSIS — D22 MELANOCYTIC NEVI: ICD-10-CM

## 2024-07-08 DIAGNOSIS — L72.0 EPIDERMAL CYST: ICD-10-CM

## 2024-07-08 DIAGNOSIS — T07XXXA INSECT BITE, NONVENOMOUS, OF OTHER, MULTIPLE, AND UNSPECIFIED SITES, WITHOUT MENTION OF INFECTION: ICD-10-CM

## 2024-07-08 DIAGNOSIS — L81.4 OTHER MELANIN HYPERPIGMENTATION: ICD-10-CM

## 2024-07-08 DIAGNOSIS — Z71.89 OTHER SPECIFIED COUNSELING: ICD-10-CM

## 2024-07-08 PROBLEM — D22.5 MELANOCYTIC NEVI OF TRUNK: Status: ACTIVE | Noted: 2024-07-08

## 2024-07-08 PROBLEM — D23.72 OTHER BENIGN NEOPLASM OF SKIN OF LEFT LOWER LIMB, INCLUDING HIP: Status: ACTIVE | Noted: 2024-07-08

## 2024-07-08 PROBLEM — S20.469A INSECT BITE (NONVENOMOUS) OF UNSPECIFIED BACK WALL OF THORAX, INITIAL ENCOUNTER: Status: ACTIVE | Noted: 2024-07-08

## 2024-07-08 PROBLEM — D23.71 OTHER BENIGN NEOPLASM OF SKIN OF RIGHT LOWER LIMB, INCLUDING HIP: Status: ACTIVE | Noted: 2024-07-08

## 2024-07-08 PROBLEM — D18.01 HEMANGIOMA OF SKIN AND SUBCUTANEOUS TISSUE: Status: ACTIVE | Noted: 2024-07-08

## 2024-07-08 PROCEDURE — 99213 OFFICE O/P EST LOW 20 MIN: CPT

## 2024-07-08 PROCEDURE — OTHER PRESCRIPTION MEDICATION MANAGEMENT: OTHER

## 2024-07-08 PROCEDURE — OTHER MIPS QUALITY: OTHER

## 2024-07-08 PROCEDURE — OTHER COUNSELING: OTHER

## 2024-07-08 ASSESSMENT — LOCATION SIMPLE DESCRIPTION DERM
LOCATION SIMPLE: RIGHT POSTERIOR UPPER ARM
LOCATION SIMPLE: UPPER BACK
LOCATION SIMPLE: LOWER BACK
LOCATION SIMPLE: LEFT UPPER BACK

## 2024-07-08 ASSESSMENT — LOCATION DETAILED DESCRIPTION DERM
LOCATION DETAILED: RIGHT PROXIMAL POSTERIOR UPPER ARM
LOCATION DETAILED: LEFT MEDIAL UPPER BACK
LOCATION DETAILED: INFERIOR LUMBAR SPINE
LOCATION DETAILED: INFERIOR THORACIC SPINE
LOCATION DETAILED: LEFT SUPERIOR MEDIAL UPPER BACK

## 2024-07-08 ASSESSMENT — LOCATION ZONE DERM
LOCATION ZONE: TRUNK
LOCATION ZONE: ARM

## 2024-07-08 NOTE — HPI: FULL BODY SKIN EXAMINATION
How Severe Are Your Spot(S)?: mild
What Type Of Note Output Would You Prefer (Optional)?: Standard Output
What Is The Reason For Today's Visit?: Full Body Skin Examination
What Is The Reason For Today's Visit? (Being Monitored For X): concerning skin lesions on a periodic basis
Additional History: Pt says she’s noticed more freckles on the back of her left calf. Pt reports no other concerns at this time.

## 2025-01-03 ENCOUNTER — APPOINTMENT (OUTPATIENT)
Dept: URBAN - METROPOLITAN AREA CLINIC 259 | Age: 55
Setting detail: DERMATOLOGY
End: 2025-01-04

## 2025-01-03 DIAGNOSIS — D18.0 HEMANGIOMA: ICD-10-CM

## 2025-01-03 DIAGNOSIS — Z86.006 PERSONAL HISTORY OF MELANOMA IN-SITU: ICD-10-CM

## 2025-01-03 DIAGNOSIS — D22 MELANOCYTIC NEVI: ICD-10-CM

## 2025-01-03 DIAGNOSIS — L81.4 OTHER MELANIN HYPERPIGMENTATION: ICD-10-CM

## 2025-01-03 DIAGNOSIS — L82.1 OTHER SEBORRHEIC KERATOSIS: ICD-10-CM

## 2025-01-03 DIAGNOSIS — L57.8 OTHER SKIN CHANGES DUE TO CHRONIC EXPOSURE TO NONIONIZING RADIATION: ICD-10-CM

## 2025-01-03 DIAGNOSIS — Z71.89 OTHER SPECIFIED COUNSELING: ICD-10-CM

## 2025-01-03 PROBLEM — D23.72 OTHER BENIGN NEOPLASM OF SKIN OF LEFT LOWER LIMB, INCLUDING HIP: Status: ACTIVE | Noted: 2025-01-03

## 2025-01-03 PROBLEM — D18.01 HEMANGIOMA OF SKIN AND SUBCUTANEOUS TISSUE: Status: ACTIVE | Noted: 2025-01-03

## 2025-01-03 PROBLEM — D23.71 OTHER BENIGN NEOPLASM OF SKIN OF RIGHT LOWER LIMB, INCLUDING HIP: Status: ACTIVE | Noted: 2025-01-03

## 2025-01-03 PROBLEM — D22.5 MELANOCYTIC NEVI OF TRUNK: Status: ACTIVE | Noted: 2025-01-03

## 2025-01-03 PROCEDURE — OTHER MIPS QUALITY: OTHER

## 2025-01-03 PROCEDURE — 99213 OFFICE O/P EST LOW 20 MIN: CPT

## 2025-01-03 PROCEDURE — OTHER COUNSELING: OTHER

## 2025-01-03 ASSESSMENT — LOCATION SIMPLE DESCRIPTION DERM
LOCATION SIMPLE: LEFT UPPER BACK
LOCATION SIMPLE: RIGHT UPPER BACK
LOCATION SIMPLE: RIGHT POSTERIOR UPPER ARM
LOCATION SIMPLE: UPPER BACK

## 2025-01-03 ASSESSMENT — LOCATION DETAILED DESCRIPTION DERM
LOCATION DETAILED: RIGHT SUPERIOR MEDIAL UPPER BACK
LOCATION DETAILED: LEFT LATERAL UPPER BACK
LOCATION DETAILED: RIGHT SUPERIOR UPPER BACK
LOCATION DETAILED: INFERIOR THORACIC SPINE
LOCATION DETAILED: LEFT MID-UPPER BACK
LOCATION DETAILED: LEFT SUPERIOR UPPER BACK
LOCATION DETAILED: RIGHT PROXIMAL POSTERIOR UPPER ARM

## 2025-01-03 ASSESSMENT — LOCATION ZONE DERM
LOCATION ZONE: TRUNK
LOCATION ZONE: ARM

## 2025-02-11 ENCOUNTER — OFFICE VISIT (OUTPATIENT)
Dept: FAMILY MEDICINE | Facility: CLINIC | Age: 55
End: 2025-02-11
Payer: COMMERCIAL

## 2025-02-11 VITALS
DIASTOLIC BLOOD PRESSURE: 85 MMHG | RESPIRATION RATE: 16 BRPM | TEMPERATURE: 97.3 F | HEIGHT: 66 IN | SYSTOLIC BLOOD PRESSURE: 140 MMHG | BODY MASS INDEX: 32.71 KG/M2 | HEART RATE: 93 BPM | WEIGHT: 203.5 LBS | OXYGEN SATURATION: 98 %

## 2025-02-11 DIAGNOSIS — E03.9 ACQUIRED HYPOTHYROIDISM: ICD-10-CM

## 2025-02-11 DIAGNOSIS — E66.09 CLASS 1 OBESITY DUE TO EXCESS CALORIES WITH SERIOUS COMORBIDITY AND BODY MASS INDEX (BMI) OF 33.0 TO 33.9 IN ADULT: ICD-10-CM

## 2025-02-11 DIAGNOSIS — R03.0 ELEVATED BP WITHOUT DIAGNOSIS OF HYPERTENSION: ICD-10-CM

## 2025-02-11 DIAGNOSIS — Z00.00 ENCOUNTER FOR ROUTINE ADULT HEALTH EXAMINATION WITHOUT ABNORMAL FINDINGS: Primary | ICD-10-CM

## 2025-02-11 DIAGNOSIS — E66.811 CLASS 1 OBESITY DUE TO EXCESS CALORIES WITH SERIOUS COMORBIDITY AND BODY MASS INDEX (BMI) OF 33.0 TO 33.9 IN ADULT: ICD-10-CM

## 2025-02-11 DIAGNOSIS — R42 VERTIGO: ICD-10-CM

## 2025-02-11 DIAGNOSIS — R09.82 PND (POST-NASAL DRIP): ICD-10-CM

## 2025-02-11 DIAGNOSIS — R05.1 ACUTE COUGH: ICD-10-CM

## 2025-02-11 DIAGNOSIS — F33.1 MAJOR DEPRESSIVE DISORDER, RECURRENT EPISODE, MODERATE (H): ICD-10-CM

## 2025-02-11 LAB
ALBUMIN SERPL BCG-MCNC: 4.3 G/DL (ref 3.5–5.2)
ALP SERPL-CCNC: 82 U/L (ref 40–150)
ALT SERPL W P-5'-P-CCNC: 39 U/L (ref 0–50)
ANION GAP SERPL CALCULATED.3IONS-SCNC: 13 MMOL/L (ref 7–15)
AST SERPL W P-5'-P-CCNC: 31 U/L (ref 0–45)
BILIRUB SERPL-MCNC: 0.5 MG/DL
BUN SERPL-MCNC: 9.9 MG/DL (ref 6–20)
CALCIUM SERPL-MCNC: 9.4 MG/DL (ref 8.8–10.4)
CHLORIDE SERPL-SCNC: 103 MMOL/L (ref 98–107)
CHOLEST SERPL-MCNC: 181 MG/DL
CREAT SERPL-MCNC: 0.94 MG/DL (ref 0.51–0.95)
EGFRCR SERPLBLD CKD-EPI 2021: 71 ML/MIN/1.73M2
FASTING STATUS PATIENT QL REPORTED: YES
FASTING STATUS PATIENT QL REPORTED: YES
GLUCOSE SERPL-MCNC: 100 MG/DL (ref 70–99)
HCO3 SERPL-SCNC: 23 MMOL/L (ref 22–29)
HDLC SERPL-MCNC: 55 MG/DL
LDLC SERPL CALC-MCNC: 99 MG/DL
NONHDLC SERPL-MCNC: 126 MG/DL
POTASSIUM SERPL-SCNC: 3.9 MMOL/L (ref 3.4–5.3)
PROT SERPL-MCNC: 7.1 G/DL (ref 6.4–8.3)
SODIUM SERPL-SCNC: 139 MMOL/L (ref 135–145)
TRIGL SERPL-MCNC: 133 MG/DL
TSH SERPL DL<=0.005 MIU/L-ACNC: 0.71 UIU/ML (ref 0.3–4.2)

## 2025-02-11 PROCEDURE — 80061 LIPID PANEL: CPT | Performed by: FAMILY MEDICINE

## 2025-02-11 PROCEDURE — 99396 PREV VISIT EST AGE 40-64: CPT | Mod: 25 | Performed by: FAMILY MEDICINE

## 2025-02-11 PROCEDURE — 84443 ASSAY THYROID STIM HORMONE: CPT | Performed by: FAMILY MEDICINE

## 2025-02-11 PROCEDURE — 80053 COMPREHEN METABOLIC PANEL: CPT | Performed by: FAMILY MEDICINE

## 2025-02-11 PROCEDURE — 90673 RIV3 VACCINE NO PRESERV IM: CPT | Performed by: FAMILY MEDICINE

## 2025-02-11 PROCEDURE — 91320 SARSCV2 VAC 30MCG TRS-SUC IM: CPT | Performed by: FAMILY MEDICINE

## 2025-02-11 PROCEDURE — 90480 ADMN SARSCOV2 VAC 1/ONLY CMP: CPT | Performed by: FAMILY MEDICINE

## 2025-02-11 PROCEDURE — 99213 OFFICE O/P EST LOW 20 MIN: CPT | Mod: 25 | Performed by: FAMILY MEDICINE

## 2025-02-11 PROCEDURE — 36415 COLL VENOUS BLD VENIPUNCTURE: CPT | Performed by: FAMILY MEDICINE

## 2025-02-11 PROCEDURE — 90472 IMMUNIZATION ADMIN EACH ADD: CPT | Performed by: FAMILY MEDICINE

## 2025-02-11 PROCEDURE — 90471 IMMUNIZATION ADMIN: CPT | Performed by: FAMILY MEDICINE

## 2025-02-11 PROCEDURE — 90746 HEPB VACCINE 3 DOSE ADULT IM: CPT | Performed by: FAMILY MEDICINE

## 2025-02-11 RX ORDER — PROGESTERONE 100 MG/1
100 CAPSULE ORAL DAILY
COMMUNITY
Start: 2025-02-03

## 2025-02-11 RX ORDER — FLUTICASONE PROPIONATE 50 MCG
1 SPRAY, SUSPENSION (ML) NASAL DAILY PRN
COMMUNITY
Start: 2024-09-06

## 2025-02-11 RX ORDER — ESTRADIOL 0.05 MG/D
1 PATCH, EXTENDED RELEASE TRANSDERMAL
COMMUNITY
Start: 2024-12-23

## 2025-02-11 SDOH — HEALTH STABILITY: PHYSICAL HEALTH: ON AVERAGE, HOW MANY DAYS PER WEEK DO YOU ENGAGE IN MODERATE TO STRENUOUS EXERCISE (LIKE A BRISK WALK)?: 4 DAYS

## 2025-02-11 ASSESSMENT — SOCIAL DETERMINANTS OF HEALTH (SDOH): HOW OFTEN DO YOU GET TOGETHER WITH FRIENDS OR RELATIVES?: ONCE A WEEK

## 2025-02-11 ASSESSMENT — PAIN SCALES - GENERAL: PAINLEVEL_OUTOF10: NO PAIN (0)

## 2025-02-11 ASSESSMENT — PATIENT HEALTH QUESTIONNAIRE - PHQ9
SUM OF ALL RESPONSES TO PHQ QUESTIONS 1-9: 3
10. IF YOU CHECKED OFF ANY PROBLEMS, HOW DIFFICULT HAVE THESE PROBLEMS MADE IT FOR YOU TO DO YOUR WORK, TAKE CARE OF THINGS AT HOME, OR GET ALONG WITH OTHER PEOPLE: NOT DIFFICULT AT ALL
SUM OF ALL RESPONSES TO PHQ QUESTIONS 1-9: 3

## 2025-02-11 NOTE — PROGRESS NOTES
Preventive Care Visit  [facility]  Yaneth Scott MD, Family Medicine  Feb 11, 2025      Assessment & Plan     Encounter for routine adult health examination without abnormal findings  Discussed diet,calcium,exercise . Thin prep was NOT done.Eyes and teeth UTD.No immunizations needed today.See orders below for tests ordered and screening needed.    - INFLUENZA VACCINE TRIVALENT(FLUBLOK)  - COVID-19 12+ (PFIZER)  - REVIEW OF HEALTH MAINTENANCE PROTOCOL ORDERS  - Lipid panel reflex to direct LDL Fasting; Future  - Comprehensive metabolic panel (BMP + Alb, Alk Phos, ALT, AST, Total. Bili, TP); Future  -- HEPATITIS B VACCINE (20 YEARS AND OLDER) (ENGERIX-B/RECOMBIVAX)  - TSH with free T4 reflex  - Comprehensive metabolic panel (BMP + Alb, Alk Phos, ALT, AST, Total. Bili, TP)  - Lipid panel reflex to direct LDL Fasting    Acquired hypothyroidism  Sees endocrinology but will check her TSH per pts request    TSH with free T4 reflex; Future      Major depressive disorder, recurrent episode, moderate (H)  She is seeing a therapist but currently does not think she needs an antidepressant , she will do more physical activities , vit D etc - if it gets worse she will let me know     PND (post-nasal drip)  Has been dealing with non stop PND and mucous in the throat for the past yr and she has tried OTC Claritin , Zyrtec , Flonase and netti pot without any relief    Seems to get stuch on the back of her throat often , clears throat and coughs up mucous , we discussed seeing an ENT and I gave her the referral   - Adult ENT  Referral; Future    Acute cough  As above   - Adult ENT  Referral; Future    Vertigo  Also episodes of vertigo for yrs now , at first thought because of menopause but she has been on the HRT for almost a yr and all the symptoms ( hot flashes , insomnia , mood etc ) have gotten better but she still has occasional bouts of vertigo. She is drinking lots of fluids and eating  "three meals a day so not dehydrated and hungry , we discussed if she has sinus mucous and plugged sinuses  it can cause dizziness too   Would see what ENT has to offer   - Adult ENT  Referral; Future    Elevated BP without diagnosis of hypertension  We discussed monitoring the BP at home and then she will follow up with me in 3 weeks or so , sooner if it is very high     Class 1 obesity due to excess calories with serious comorbidity and body mass index (BMI) of 33.0 to 33.9 in adult  Has been struggling to lose weight and has tried diets and healthy eating , exercise etc no results, we discussed seeing the weight loss specialist and I placed the referral for this   - Adult Comprehensive Weight Management  Referral; Future    Patient has been advised of split billing requirements and indicates understanding: Yes        BMI  Estimated body mass index is 33.1 kg/m  as calculated from the following:    Height as of this encounter: 1.67 m (5' 5.75\").    Weight as of this encounter: 92.3 kg (203 lb 8 oz).       Counseling  Appropriate preventive services were addressed with this patient via screening, questionnaire, or discussion as appropriate for fall prevention, nutrition, physical activity, Tobacco-use cessation, social engagement, weight loss and cognition.  Checklist reviewing preventive services available has been given to the patient.  Reviewed patient's diet, addressing concerns and/or questions.   She is at risk for psychosocial distress and has been provided with information to reduce risk.           La Nena Camacho is a 54 year old, presenting for the following:  Physical        2/11/2025     7:23 AM   Additional Questions   Roomed by CICI Ordoñez   Accompanied by N/A         2/11/2025     7:23 AM   Patient Reported Additional Medications   Patient reports taking the following new medications flonase nasal spray, estradiol biweekly patch, progesterone 100 mg once daily        HPI      Some " mood is down but does not think she needs a medication , sees a therapist   1) something blocking the back of throat catching the phlegm clearing the throat , thick cluster of mucus at the back of throat PND netti pot did not work , flonase did not help zyrtec claritin etc do not help , felt for yr   2) for a long time she has had dizziness on and off , though tto be related to menopause , off balance once a month , she has been on the HRT since March of 2024 and they did not help with this , started during menopause and went away and came back , at 45   3) weight has crept up and struggling to lose it   Health Care Directive  Patient does not have a Health Care Directive:       2/11/2025   General Health   How would you rate your overall physical health? Good   Feel stress (tense, anxious, or unable to sleep) To some extent   (!) STRESS CONCERN      2/11/2025   Nutrition   Three or more servings of calcium each day? Yes   Diet: Regular (no restrictions)   How many servings of fruit and vegetables per day? (!) 2-3   How many sweetened beverages each day? 0-1         2/11/2025   Exercise   Days per week of moderate/strenous exercise 4 days         2/11/2025   Social Factors   Frequency of gathering with friends or relatives Once a week   Worry food won't last until get money to buy more No   Food not last or not have enough money for food? No   Do you have housing? (Housing is defined as stable permanent housing and does not include staying ouside in a car, in a tent, in an abandoned building, in an overnight shelter, or couch-surfing.) Yes   Are you worried about losing your housing? No   Lack of transportation? No   Unable to get utilities (heat,electricity)? No         2/11/2025   Fall Risk   Fallen 2 or more times in the past year? No   Trouble with walking or balance? No          2/11/2025   Dental   Dentist two times every year? Yes         4/26/2024   TB Screening   Were you born outside of the [place]? No        Today's PHQ-9 Score:       2025     7:17 AM   PHQ-9 SCORE   PHQ-9 Total Score MyChart 3 (Minimal depression)   PHQ-9 Total Score 3        Patient-reported         2025   Substance Use   Alcohol more than 3/day or more than 7/wk No   Do you use any other substances recreationally? No     Social History     Tobacco Use    Smoking status: Former     Current packs/day: 0.00     Types: Cigarettes     Quit date:      Years since quittin.1    Smokeless tobacco: Never    Tobacco comments:     quit for 4 years.   Vaping Use    Vaping status: Never Used   Substance Use Topics    Alcohol use: Yes     Comment: 1-2 per month    Drug use: No           10/20/2022   LAST FHS-7 RESULTS   1st degree relative breast or ovarian cancer No   Any relative bilateral breast cancer No   Any male have breast cancer No   Any ONE woman have BOTH breast AND ovarian cancer No   Any woman with breast cancer before 50yrs No   2 or more relatives with breast AND/OR ovarian cancer No   2 or more relatives with breast AND/OR bowel cancer No        Mammogram Screening - Mammogram every 1-2 years updated in Health Maintenance based on mutual decision making          2025   One time HIV Screening   Previous HIV test? Yes         2025   STI Screening   New sexual partner(s) since last STI/HIV test? No     History of abnormal Pap smear: No - age 30- 64 PAP with HPV every 5 years recommended        Latest Ref Rng & Units 2022     9:45 AM 2018     5:05 PM 2018     4:38 PM   PAP / HPV   PAP (Historical)    NIL    HPV 16 DNA NEG^Negative  Negative     HPV 18 DNA NEG^Negative  Negative     Other HR HPV NEG^Negative  Negative     PAP-ABSTRACT  See Scanned Document             This result is from an external source.     ASCVD Risk   The 10-year ASCVD risk score (Carmen DK, et al., 2019) is: 2.5%    Values used to calculate the score:      Age: 54 years      Sex: Female      Is Non-   American: No      Diabetic: No      Tobacco smoker: No      Systolic Blood Pressure: 138 mmHg      Is BP treated: No      HDL Cholesterol: 56 mg/dL      Total Cholesterol: 198 mg/dL           Reviewed and updated as needed this visit by Provider                    Past Medical History:   Diagnosis Date    Cervical high risk HPV (human papillomavirus) test positive 10/2014    NIL pap, + HPV (not 16 or 18) '16 HPV neg    Hashimoto's thyroiditis 10/8/2010     Past Surgical History:   Procedure Laterality Date    C ANALGESIA,EPIDURAL,LABOR &   , 2005    COLONOSCOPY WITH CO2 INSUFFLATION N/A 2/3/2021    Procedure: COLONOSCOPY, WITH CO2 INSUFFLATION;  Surgeon: Marcelo Amador MD;  Location: [facility] OR    HC EXCISION BREAST LESION, OPEN >=1  2001    LAPAROSCOPIC APPENDECTOMY  2013    Procedure: LAPAROSCOPIC APPENDECTOMY;  Laparoscopic Appendectomy;  Surgeon: Mercy Leal MD;  Location: [facility] OR    LAPAROSCOPIC CHOLECYSTECTOMY N/A 2019    Procedure: LAPAROSCOPIC CHOLECYSTECTOMY;  Surgeon: Marlene Taylor MD;  Location:  OR     Lab work is in process  Labs reviewed in EPIC  BP Readings from Last 3 Encounters:   25 (!) 140/85   24 138/80   23 (!) 150/86    Wt Readings from Last 3 Encounters:   25 92.3 kg (203 lb 8 oz)   24 92.7 kg (204 lb 4.8 oz)   23 91.2 kg (201 lb)                  Patient Active Problem List   Diagnosis    Hashimoto's thyroiditis    CARDIOVASCULAR SCREENING; LDL GOAL LESS THAN 160    Moderate major depression (H)    Migraine    Cervical high risk HPV (human papillomavirus) test positive    Menopausal syndrome (hot flashes)    Overweight    Major depressive disorder, recurrent episode, moderate (H)    Anxiety    Hypothyroidism    Nontoxic multinodular goiter    Insomnia due to medical condition    Galactorrhea not associated with childbirth    Family history of diabetes mellitus    Seasonal allergic rhinitis, unspecified  trigger    Pain in both feet    History of skin cancer     Past Surgical History:   Procedure Laterality Date    C ANALGESIA,EPIDURAL,LABOR &   , 2005    COLONOSCOPY WITH CO2 INSUFFLATION N/A 2/3/2021    Procedure: COLONOSCOPY, WITH CO2 INSUFFLATION;  Surgeon: Marcelo Amador MD;  Location: [facility] OR    HC EXCISION BREAST LESION, OPEN >=1  2001    LAPAROSCOPIC APPENDECTOMY  2013    Procedure: LAPAROSCOPIC APPENDECTOMY;  Laparoscopic Appendectomy;  Surgeon: Mercy Leal MD;  Location: [facility] OR    LAPAROSCOPIC CHOLECYSTECTOMY N/A 2019    Procedure: LAPAROSCOPIC CHOLECYSTECTOMY;  Surgeon: Marlene Taylor MD;  Location:  OR       Social History     Tobacco Use    Smoking status: Former     Current packs/day: 0.00     Types: Cigarettes     Quit date:      Years since quittin.1    Smokeless tobacco: Never    Tobacco comments:     quit for 4 years.   Substance Use Topics    Alcohol use: Yes     Comment: 1-2 per month     Family History   Problem Relation Age of Onset    Asthma Mother     Psychotic Disorder Mother     Musculoskeletal Disorder Mother         fibromyalgia    Hypertension Mother     Depression Mother     Anesthesia Reaction Mother     Thyroid Disease Mother     C.A.D. Maternal Grandfather     Heart Disease Maternal Grandfather     Cerebrovascular Disease Maternal Grandfather     Diabetes Paternal Grandfather     Cancer Maternal Grandmother         lung    Other Cancer Maternal Grandmother     Cancer Paternal Grandmother         stomach    Other Cancer Paternal Grandmother          Current Outpatient Medications   Medication Sig Dispense Refill    estradiol (VIVELLE-DOT) 0.05 MG/24HR bi-weekly patch Place 1 patch onto the skin twice a week.      fluticasone (FLONASE) 50 MCG/ACT nasal spray Spray 1 spray into both nostrils daily as needed.      levothyroxine (SYNTHROID/LEVOTHROID) 175 MCG tablet       progesterone (PROMETRIUM) 100 MG capsule Take 100 mg by  "mouth daily.      rizatriptan (MAXALT) 10 MG tablet Take 1 tablet by mouth See Admin Instructions       Allergies   Allergen Reactions    Erythromycin Hives    Other (Do Not Use) Rash     Colth tape is the one that gives her the reaction.Burn type reacton , raw.    Tape [Adhesive Tape]      Colth tape is the one that gives her the reaction.Burn type reacton , raw.    Penicillin [Penicillins]      Family alergies to the antibiotic. So it was never given to her.     Recent Labs   Lab Test 04/26/24  1000 05/31/23  1440 01/25/23  0852 01/25/23  0852 01/13/21  0915 10/18/18  0100 04/03/17  1151   A1C 5.4  --   --  5.5 5.3  --   --    *  --   --  100 102*  --   --    HDL 56  --   --  59 53  --   --    TRIG 120  --   --  105 124  --   --    ALT 23  --   --  21  --   --  28   CR 0.83 0.87   < > 0.87  --  0.81 0.83   GFRESTIMATED 83 79   < > 79  --  75 73   GFRESTBLACK  --   --   --   --   --  >90 89   POTASSIUM 3.9 3.9   < > 4.1  --  3.5 3.9   TSH  --   --   --  0.46  --  0.68  --     < > = values in this interval not displayed.          Review of Systems  Constitutional, HEENT, cardiovascular, pulmonary, GI, , musculoskeletal, neuro, skin, endocrine and psych systems are negative, except as otherwise noted.     Objective    Exam  LMP 08/12/2016    Estimated body mass index is 33.48 kg/m  as calculated from the following:    Height as of 4/26/24: 1.664 m (5' 5.5\").    Weight as of 4/26/24: 92.7 kg (204 lb 4.8 oz).    Physical Exam  GENERAL: alert and no distress  EYES: Eyes grossly normal to inspection, PERRL and conjunctivae and sclerae normal  HENT: ear canals and TM's normal, nose and mouth without ulcers or lesions  NECK: no adenopathy, no asymmetry, masses, or scars  RESP: lungs clear to auscultation - no rales, rhonchi or wheezes  CV: regular rate and rhythm, normal S1 S2, no S3 or S4, no murmur, click or rub, no peripheral edema  ABDOMEN: soft, nontender, no hepatosplenomegaly, no masses and bowel sounds " normal  MS: no gross musculoskeletal defects noted, no edema  SKIN: no suspicious lesions or rashes  NEURO: Normal strength and tone, mentation intact and speech normal  PSYCH: mentation appears normal, affect normal/bright        Signed Electronically by: Yaneth Scott MD    Answers submitted by the patient for this visit:  Patient Health Questionnaire (Submitted on 2/11/2025)  If you checked off any problems, how difficult have these problems made it for you to do your work, take care of things at home, or get along with other people?: Not difficult at all  PHQ9 TOTAL SCORE: 3

## 2025-03-04 NOTE — PROGRESS NOTES
"Janice is a 54 year old who is being evaluated via a billable video visit.      The patient has been notified of following:     \"This video visit will be conducted via a call between you and your physician/provider. We have found that certain health care needs can be provided without the need for an in-person physical exam.  This service lets us provide the care you need with a video conversation.  If a prescription is necessary we can send it directly to your pharmacy.  If lab work is needed we can place an order for that and you can then stop by our lab to have the test done at a later time.    Video visits are billed at different rates depending on your insurance coverage.  Please reach out to your insurance provider with any questions.    If during the course of the call the physician/provider feels a video visit is not appropriate, you will not be charged for this service.\"    Patient has given verbal consent for Video visit? {YES-NO  Default Yes:4444::\"Yes\"}    How would you like to obtain your AVS? {AVS Preference:931510}    If the video visit is dropped, the invitation should be resent by: {video visit invitation:813037}    Will anyone else be joining your video visit? {:171884}    I{If patient encounters technical issues they should call [phone] :095050}    Video-Visit Details    Type of service:  Video Visit    Originating Location (pt. Location): {video visit patient location:508918::\"Home\"}    Distant Location (provider location):   {[facility] Provider Location:492939}    Platform used for Video Visit: {Virtual Visit Platforms:530982::\"Arkivum\"}    Video Start Time: {video visit start/end time for provider to select:646853}    Video End Time:{video visit start/end time for provider to select:328274}    New Medical Weight Management Consult    PATIENT:  Janice Morgan   MRN:         [id number]   :         1970  NEREIDA:         3/7/2025      Dear Yaneth Scott MD,    I had the pleasure of seeing your " "patient, Janice Morgan. Full intake/assessment was done to determine barriers to weight loss success and develop a treatment plan. Janice Morgan is a 54 year old female interested in treatment of medical problems associated with excess weight. She has a height of Data Unavailable, a weight of 0 lbs 0 oz, and the calculated There is no height or weight on file to calculate BMI.    Assessment & Plan   Problem List Items Addressed This Visit    None       PROGRAM OVERVIEW  Reviewed options at [facility].   All questions were answered. Education provided on chronic disease management of obesity.    SURGICAL WEIGHT LOSS   Option presented given pt BMI and current comorbid conditions. No current interest.  Website for bariatric online information session provided.  Pt will review at home and we will discuss at our follow up visit. [email or web address]    MEDICATIONS:  We discussed healthy habits to assist with weight loss. We reviewed medications associated with weight gain. We discussed the role of pharmacological agents in the treatment of obesity and the \"off-label\" use of medications in this practice. We reviewed medication that may assist with weight loss. Indications, contraindications, risks/benefits, and potential side effects were discussed.   *** was prescribed. Discussed that medications must always be used together with lifestyle changes. Reviewed rationale for long term use of pharmacotherapy in chronic disease management for obesity.      AOM Considerations:  Phentermine:  Avoid w/elevated BP  Topiramate:    GLP-1:      Naltrexone:    Wellbutrin: Avoid w/elevated BP   Metformin:    Contrave:  Qsymia:            PATIENT INSTRUCTIONS:    *** minutes spent on the date of the encounter doing chart review, history and exam, review test results, counseling, developing plan of care, documentation, and further activities as noted above.      She has the following co-morbidities:      "    No data to display                     No data to display                     No data to display                     No data to display                     No data to display                     No data to display                     No data to display                     No data to display                PAST MEDICAL HISTORY:  Past Medical History:   Diagnosis Date    Cervical high risk HPV (human papillomavirus) test positive 10/2014    NIL pap, + HPV (not 16 or 18) '16 HPV neg    Hashimoto's thyroiditis 10/8/2010            No data to display                Social History     Tobacco Use    Smoking status: Former     Current packs/day: 0.00     Types: Cigarettes     Quit date:      Years since quittin.2    Smokeless tobacco: Never    Tobacco comments:     quit for 4 years.   Vaping Use    Vaping status: Never Used   Substance Use Topics    Alcohol use: Yes     Comment: 1-2 per month    Drug use: No             No data to display                     No data to display                     No data to display                MEDICATIONS:   Current Outpatient Medications   Medication Sig Dispense Refill    estradiol (VIVELLE-DOT) 0.05 MG/24HR bi-weekly patch Place 1 patch onto the skin twice a week.      fluticasone (FLONASE) 50 MCG/ACT nasal spray Spray 1 spray into both nostrils daily as needed.      levothyroxine (SYNTHROID/LEVOTHROID) 175 MCG tablet       progesterone (PROMETRIUM) 100 MG capsule Take 100 mg by mouth daily.      rizatriptan (MAXALT) 10 MG tablet Take 1 tablet by mouth See Admin Instructions         ALLERGIES:   Allergies   Allergen Reactions    Erythromycin Hives    Other (Do Not Use) Rash     Colth tape is the one that gives her the reaction.Burn type reacton , raw.    Tape [Adhesive Tape]      Colth tape is the one that gives her the reaction.Burn type reacton , raw.    Penicillin [Penicillins]      Family alergies to the antibiotic. So it was never given to her.  "      ROS:    HEENT  H/O glaucoma:  {YES/NO:695247::\"no\"}  Cardiovascular  CAD:   {YES/NO:263294::\"no\"}  Palpitations:   {YES/NO:898821::\"no\"}  HTN:    {YES/NO:198729::\"no\"}  Gastrointestinal  GERD:   {YES/NO:467865::\"no\"}  Constipation:   {YES/NO:889584::\"no\"}  Liver Dz:   {YES/NO:084232::\"no\"}  H/O Pancreatitis:  {YES/NO:961796::\"no\"}  H/O Gallbladder Dz: {YES/NO:459968::\"no\"}  Psychiatric  Moods Stable:  {YES/NO:086299::\"no\"}  Anxiety:   {YES/NO:982073::\"no\"}  Depression:  {YES/NO:873980::\"no\"}  Bipolar:  {YES/NO:123509::\"no\"}  H/O ETOH/Drug abuse: {YES/NO:834914::\"no\"}  H/O eating disorder: {YES/NO:784555::\"no\"}  Endocrine  PMH/FMH of MTC or MEN2:  {YES/NO:233579::\"no\"}  Neurologic:  H/O seizures:   {YES/NO:648320::\"no\"}  Headaches:  {YES/NO:648513::\"no\"}  Memory Impairment:  {YES/NO:497725::\"no\"}    H/O kidney stones:  {YES/NO:940171::\"no\"}  Kidney disease:  {YES/NO:476330::\"no\"}  Current birth control:      LABS/RECORDS REVIEWED:  Hemoglobin A1C   Date Value Ref Range Status   04/26/2024 5.4 0.0 - 5.6 % Final     Comment:     Normal <5.7%   Prediabetes 5.7-6.4%    Diabetes 6.5% or higher     Note: Adopted from ADA consensus guidelines.   01/13/2021 5.3 0 - 5.6 % Final     Comment:     Normal <5.7% Prediabetes 5.7-6.4%  Diabetes 6.5% or higher - adopted from ADA   consensus guidelines.       Vitamin D Deficiency screening   Date Value Ref Range Status   01/12/2016 27 20 - 75 ug/L Final     Comment:     Season, race, dietary intake, and treatment affect the concentration of   25-hydroxy-Vitamin D. Values may decrease during winter months and increase   during summer months. Values 20-29 ug/L may indicate Vitamin D insufficiency   and values <20 ug/L may indicate Vitamin D deficiency.   Vitamin D determination is routinely performed by an immunoassay specific for   25 hydroxyvitamin D3.  If an individual is on vitamin D2 (ergocalciferol)   supplementation, please specify 25 OH vitamin D2 and D3 level " determination   by   LCMSMS test VITD23.       TSH   Date Value Ref Range Status   02/11/2025 0.71 0.30 - 4.20 uIU/mL Final   10/18/2018 0.68 0.40 - 4.00 mU/L Final     Sodium   Date Value Ref Range Status   02/11/2025 139 135 - 145 mmol/L Final   10/18/2018 141 133 - 144 mmol/L Final     Potassium   Date Value Ref Range Status   02/11/2025 3.9 3.4 - 5.3 mmol/L Final   10/18/2018 3.5 3.4 - 5.3 mmol/L Final     Chloride   Date Value Ref Range Status   02/11/2025 103 98 - 107 mmol/L Final   10/18/2018 105 94 - 109 mmol/L Final     Carbon Dioxide   Date Value Ref Range Status   10/18/2018 28 20 - 32 mmol/L Final     Carbon Dioxide (CO2)   Date Value Ref Range Status   02/11/2025 23 22 - 29 mmol/L Final     Anion Gap   Date Value Ref Range Status   02/11/2025 13 7 - 15 mmol/L Final   10/18/2018 8 3 - 14 mmol/L Final     Glucose   Date Value Ref Range Status   02/11/2025 100 (H) 70 - 99 mg/dL Final   10/18/2018 96 70 - 99 mg/dL Final     Urea Nitrogen   Date Value Ref Range Status   02/11/2025 9.9 6.0 - 20.0 mg/dL Final   10/18/2018 16 7 - 30 mg/dL Final     Creatinine   Date Value Ref Range Status   02/11/2025 0.94 0.51 - 0.95 mg/dL Final   10/18/2018 0.81 0.52 - 1.04 mg/dL Final     GFR Estimate   Date Value Ref Range Status   02/11/2025 71 >60 mL/min/1.73m2 Final     Comment:     eGFR calculated using 2021 CKD-EPI equation.   10/18/2018 75 >60 mL/min/1.7m2 Final     Comment:     Non  GFR Calc     Calcium   Date Value Ref Range Status   02/11/2025 9.4 8.8 - 10.4 mg/dL Final   10/18/2018 8.9 8.5 - 10.1 mg/dL Final     ALT   Date Value Ref Range Status   02/11/2025 39 0 - 50 U/L Final   04/03/2017 28 0 - 50 U/L Final     AST   Date Value Ref Range Status   02/11/2025 31 0 - 45 U/L Final   04/03/2017 12 0 - 45 U/L Final     Cholesterol   Date Value Ref Range Status   02/11/2025 181 <200 mg/dL Final   01/13/2021 184 <200 mg/dL Final     HDL Cholesterol   Date Value Ref Range Status   01/13/2021 53 >49 mg/dL  Final     Direct Measure HDL   Date Value Ref Range Status   02/11/2025 55 >=50 mg/dL Final     LDL Cholesterol Calculated   Date Value Ref Range Status   02/11/2025 99 <100 mg/dL Final   01/13/2021 102 (H) <100 mg/dL Final     Comment:     Above desirable:  100-129 mg/dl  Borderline High:  130-159 mg/dL  High:             160-189 mg/dL  Very high:       >189 mg/dl       Triglycerides   Date Value Ref Range Status   02/11/2025 133 <150 mg/dL Final   01/13/2021 124 <150 mg/dL Final     Comment:     Fasting specimen     Hemoglobin   Date Value Ref Range Status   04/26/2024 14.8 11.7 - 15.7 g/dL Final   10/18/2018 14.4 11.7 - 15.7 g/dL Final           BP Readings from Last 6 Encounters:   02/11/25 (!) 140/85   04/26/24 138/80   05/31/23 (!) 150/86   01/25/23 124/88   04/12/22 138/80   03/25/22 128/80       Pulse Readings from Last 6 Encounters:   02/11/25 93   04/26/24 83   05/31/23 85   01/25/23 88   04/12/22 100   03/25/22 98       PHYSICAL EXAM:  [facility] 08/12/2016   GENERAL: Healthy, alert and no distress  EYES: Eyes grossly normal to inspection.    RESP: No audible wheeze, cough, or visible cyanosis.  No increased work of breathing.    SKIN: Visible skin clear. No significant rash, abnormal pigmentation or lesions.  NEURO: Mentation and speech appropriate for age.  PSYCH: Mentation appears normal, affect normal/bright, judgement and insight intact, normal speech and appearance well-groomed.      Sincerely,      Rita Jiang PA-C

## 2025-03-07 ENCOUNTER — VIRTUAL VISIT (OUTPATIENT)
Dept: SURGERY | Facility: CLINIC | Age: 55
End: 2025-03-07
Payer: COMMERCIAL

## 2025-03-07 PROBLEM — E66.811 CLASS 1 OBESITY WITHOUT SERIOUS COMORBIDITY WITH BODY MASS INDEX (BMI) OF 32.0 TO 32.9 IN ADULT, UNSPECIFIED OBESITY TYPE: Status: ACTIVE | Noted: 2025-03-07

## 2025-03-10 ENCOUNTER — VIRTUAL VISIT (OUTPATIENT)
Dept: SURGERY | Facility: CLINIC | Age: 55
End: 2025-03-10
Payer: COMMERCIAL

## 2025-03-10 VITALS — BODY MASS INDEX: 32.71 KG/M2 | HEIGHT: 66 IN | WEIGHT: 203.5 LBS

## 2025-03-10 DIAGNOSIS — E66.811 CLASS 1 OBESITY WITHOUT SERIOUS COMORBIDITY WITH BODY MASS INDEX (BMI) OF 32.0 TO 32.9 IN ADULT, UNSPECIFIED OBESITY TYPE: Primary | ICD-10-CM

## 2025-03-10 PROCEDURE — 97802 MEDICAL NUTRITION INDIV IN: CPT | Mod: 95 | Performed by: DIETITIAN, REGISTERED

## 2025-03-10 NOTE — PROGRESS NOTES
"Janice is a 54 year old who is being evaluated via a billable video visit.      The patient has been notified of following:     \"This video visit will be conducted via a call between you and your physician/provider. We have found that certain health care needs can be provided without the need for an in-person physical exam.  This service lets us provide the care you need with a video conversation.  If a prescription is necessary we can send it directly to your pharmacy.  If lab work is needed we can place an order for that and you can then stop by our lab to have the test done at a later time.    Video visits are billed at different rates depending on your insurance coverage.  Please reach out to your insurance provider with any questions.    If during the course of the call the physician/provider feels a video visit is not appropriate, you will not be charged for this service.\"    Patient has given verbal consent for Video visit? Yes    How would you like to obtain your AVS? MyChart    If the video visit is dropped, the invitation should be resent by: Text to cell phone: [phone]    Will anyone else be joining your video visit? No    I    Video-Visit Details    Type of service:  Video Visit    Originating Location (pt. Location): Home    Distant Location (provider location):   Off-Site - Provider Home Office    Platform used for Video Visit: [employer]    Video Start Time:  10:29am    Video End Time: 11:01am    MEDICAL WEIGHT LOSS INITIAL EVALUATION  Patient accompanied by: self  DIAGNOSIS:  Obesity Class I    NUTRITION HISTORY:  Diet and exercise history per provider visit on 3/7/2025 as follows:    How concerned are you about your weight? Very Concerned   I became overweight After Pregnancy   The following factors have contributed to my weight gain Eating Wrong Types of Food    Eating Too Much    Lack of Exercise    Genetic (Runs in the Family)    Stress   I have tried the following methods to lose weight " Watching Portions or Calories    Exercise    Weight Watchers    Fasting   My lowest weight since age 16 was 125   My highest weight since age 16 was 203   The most weight I have ever lost was (lbs) 30   I have the following family history of obesity/being overweight My father is overweight   How has your weight changed over the last year? Gained   How many pounds? 5              3/7/2025     7:27 AM   Diet Recall Review with Patient   If you do eat breakfast, what types of food do you eat? yogurt, eggs, cheerios, banana   If you do eat lunch, what types of food do you typically eat? salads, tuna, bagels,   If you do eat supper, what types of food do you typically eat? chicken, beef, order out (chipotle), fish, rice, vegetable   If you do snack, what types of food do you typically eat? bread, nuts, bananas, chocolate, chips, popcorn, rice cakes, crackers   How many glasses of juice do you drink in a typical day? 0   How many of glasses of milk do you drink in a typical day? 0   How many 8oz glasses of sugar containing drinks such as [employer]-A[employer]/sweet tea do you drink in a day? 0   How many cans/bottles of sugar pop/soda/tea/sports drinks do you drink in a day? 0   How many cans/bottles of diet pop/soda/tea or sports drink do you drink in a day? 0   How often do you have a drink of alcohol? 2-4 Times a Month   If you do drink, how many drinks might you have in a day? 1 or 2   Wake: 7-8AM  Breakfast: 7:30-8:30 AM yogurt, cereal, or eggs  Lunch: 3PM sandwich or salad or leftovers  Dinner:6-7PM chicken or fish or steak w/ quinoa or rice or potato + vegetable  Snacks: Does not notice that she is snacking but will find herself snacking throughout the day. Snacks on bread, cheese.  Cravings: Salty potato chips. Won't notice when she is eating it.     Hydration: Water at least 1-2cups/hour, or will drink bubbly water or a diet coke          3/7/2025     7:27 AM   Eating Habits   Generally, my meals include foods like these  bread, pasta, rice, potatoes, corn, crackers, sweet dessert, pop, or juice A Few Times a Week   Generally, my meals include foods like these fried meats, brats, burgers, french fries, pizza, cheese, chips, or ice cream Once a Week   Eat fast food (like McDonalds, Burger Aakash, Taco Bell) Never   Eat at a buffet or sit-down restaurant Never   Eat most of my meals in front of the TV or computer Once a Week   Often skip meals, eat at random times, have no regular eating times Once a Week   Rarely sit down for a meal but snack or graze throughout Less Than Weekly   Eat extra snacks between meals Almost Everyday   Eat most of my food at the end of the day Never   Eat in the middle of the night or wake up at night to eat Never   Eat extra snacks to prevent or correct low blood sugar Never   Eat to prevent acid reflux or stomach pain Never   Worry about not having enough food to eat Never   I eat when I am depressed Never   I eat when I am stressed A Few Times a Week   I eat when I am bored Almost Everyday   I eat when I am anxious Less Than Weekly   I eat when I am happy or as a reward Less Than Weekly   I feel hungry all the time even if I just have eaten Less Than Weekly   Feeling full is important to me Never   I finish all the food on my plate even if I am already full Less Than Weekly   I can't resist eating delicious food or walk past the good food/smell Never   I eat/snack without noticing that I am eating Almost Everyday   I eat when I am preparing the meal Once a Week   I eat more than usual when I see others eating Less Than Weekly   I have trouble not eating sweets, ice cream, cookies, or chips if they are around the house Almost Everyday   I think about food all day Once a Week   What foods, if any, do you crave? Chips/Crackers   Please list any other foods you crave? chocolate              3/7/2025     7:27 AM   Amount of Food   I feel out of control when eating Almost Everyday   I eat a large amount of food,  like a loaf of bread, a box of cookies, a pint/quart of ice cream, all at once Monthly   I eat a large amount of food even when I am not hungry Weekly   I eat rapidly Monthly   I eat alone because I feel embarrassed and do not want others to see how much I have eaten Never   I eat until I am uncomfortably full Monthly   I feel bad, disgusted, or guilty after I overeat Weekly      I make myself vomit what I have eaten or use laxatives to get rid of food. Never   Does not feel she overeats her meals but will mindlessly snack throughout the day. Feels she eat normal portions for her meals (a bagged salad with half the dressing, one chicken breast). Will eat 1/2 a package of crackers in one sitting when snacking.        Binge Eating Screening:  Objective binges at least 1x per week for the past 3 months.  Patient senses lack of control over eating during the binges.  Binge eating causes stress.  Binge eating episodes are associated with 3 or more of the following:    Eating until uncomfortably full. No  Eating large amounts when not physically hungry. No  Eating much more rapidly than usual. No  Eating alone due to being embarassed by the amount eaten. Yes  Feeling disgusted, depressed, or guilty after overeating.  Yes  If patient answers yes to 3 of the above questions and answered yes to frequency, distress of eating behavior and avoids using compensatory behaviors, refer for binge eating assessment.            3/7/2025     7:27 AM   Activity/Exercise History   How much of a typical 12 hour day do you spend sitting? Half the Day   How much of a typical 12 hour day do you spend lying down? Less Than Half the Day   How much of a typical day do you spend walking/standing? Less Than Half the Day   How many hours (not including work) do you spend on the TV/Video Games/Computer/Tablet/Phone? 4-5 Hours   How many times a week are you active for the purpose of exercise? 2-3 Times a Week   What keeps you from being more  "active? Lack of Time    Too tired    Unsure What To Do   How many total minutes do you spend doing some activity for the purpose of exercising when you exercise? More Than 30 Minutes   Goes to gym 3x/week. 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 Does weight training at the gym for 40 min. Will walk on days not at the gym.       ADDITIONAL INFORMATION: Pt feels she makes wise choices with meal choices and portion control, but struggles with snacking and evening cravings. No AOM prescribed at recent provider visit but may consider Metformin and Naltrexone.  with recent bone marrow tx; he is doing well and pt now re-focusing on her own health.       ANTHROPOMETRICS:  Height: 66\"   Weight: 203.5 lbs [pt reported]  BMI:  32.85 kg/m2  NUTRITION DIAGNOSIS:   Obese class I related to overeating and poor lifestyle habits as evidence by patient's subjective statements and  BMI of 32.85 kg/m2   NUTRITION INTERVENTIONS  Nutrition Prescription:  Recommend modified energy- nutrient intake  Implementation:  Nutrition Education (Content):  Discussed portion sizes and plate method  Provided: Plate Guidelines, Protein Sources for Weight Loss, Fiber Content in Food    Nutrition Education (Application):   Patient to practice goals as stated below  Patient verbalizes understanding of diet by stating she will increase protein/fiber throughout first half of the day  Anticipate good compliance    Goals:  Increase protein at both breakfast and lunch  Add a planned mid-morning and/or mid-afternoon snack      FOLLOW UP AND MONITORING:    Other  - follow up in 4-8 weeks.     TIME SPENT WITH PATIENT:   32 minutes     Moira Mathews RD, LD, [facility]  Clinical Dietitian   "

## 2025-03-10 NOTE — PATIENT INSTRUCTIONS
"Hi Janice!      It was great meeting with you today! Here are some links to the handouts I referenced:        Protein Sources:  [email or web address]     Fiber Sources:  [email or web address]     My Plate:  https://www.choosemyplate.gov/        A helpful search term to type into [employer], [employer], etc is \"myplate examples.\" [Link: MyPlate examples Google Search ]     Key points from today:  Eat 3 meals/day at consistent intervals  Shoot for 70-85g protein (20-30g/meal)  Aim for 25-35g fiber (5-10g/meal)  Eat slowly: 20-30 minutes per meal     Here is a summary of the goals that we discussed:     1. Increase protein at breakfast (at least 20g)    Examples:  - Egg + avocado toast + yogurt  - yogurt + granola + berries     2. Increase both protein and fiber at lunch    Examples:  - tuna w/salsa + avocado + whole wheat bread or crackers  - salad kit + added protein (ie tuna, chicken, etc) + fruit   - See \"Easy Foods\" list below for other quick additions to meals     3. Add 1-2 planned snacks to your day  - Mid-morning and/or afternoon  - Protein + Fiber    Examples:  - hard boiled egg + apple  - low-fat cheese stick + baby carrots  - yogurt + berries  - cottage cheese + cherry tomatoes and/or cucumber slices  - 1/4c nuts + 1/4c dried fruit     Let's plan on following up in 1-2 months. This can be scheduled via our call center at . Of course, reach out sooner if you have any questions or concerns. Have a great week and welcome to the program!        Moira Mathews, SHADIA, LD, [facility]?  Clinical Dietitian       Easy Foods (by group)      Meals: pick 1 item from 3-4 food groups  Snacks: pick 1 item from 1-2 food groups     Protein:  yogurt  cottage cheese  low-fat cheese sticks/string cheese  lean deli meat (ie: chicken, turkey or low-fat ham)  lean beef or turkey jerky  pre-cooked grilled chicken breast  hard boiled eggs  nuts/seeds (in moderation - up to 1 serving [1/4c] per day)  protein " shakes/bars (ie:  Premier Protein )  low-fat milk   edamame  chickpeas  rotisserie chicken   frozen veggie/black bean burgers  canned tuna or chicken         Vegetables:  baby carrots  celery sticks  sugar snap peas  cherry tomatoes  sliced bell peppers  sliced cucumber  chopped broccoli or cauliflower  raw string beans     Fruit:  grapes  berries  apples  pears  bananas  peaches  nectarines  plums  kiwi  pineapple        Starches:  whole wheat magdalene bread ( Mejia s )  high-fiber tortillas ( [place] Carb Balance )  Bean-based tortilla chips ( Beanitos )  Whole wheat crackers ( Triscuits )  Whole wheat bread  Dried/roasted beans/lentils ( Bada Bean Bada Boom  dried Chilo beans - available online or in some stores)  roasted chickpeas  high-fiber cereal (Fiber One, Kashi, Shredded Wheat, Grape Nuts, etc)  air-popped popcorn (1-2 cups)

## 2025-03-26 ENCOUNTER — TRANSFERRED RECORDS (OUTPATIENT)
Dept: HEALTH INFORMATION MANAGEMENT | Facility: CLINIC | Age: 55
End: 2025-03-26
Payer: COMMERCIAL

## 2025-04-25 ENCOUNTER — TRANSFERRED RECORDS (OUTPATIENT)
Dept: HEALTH INFORMATION MANAGEMENT | Facility: CLINIC | Age: 55
End: 2025-04-25
Payer: COMMERCIAL

## 2025-08-27 ENCOUNTER — VIRTUAL VISIT (OUTPATIENT)
Dept: SURGERY | Facility: CLINIC | Age: 55
End: 2025-08-27
Payer: COMMERCIAL

## 2025-08-27 VITALS — HEIGHT: 66 IN | BODY MASS INDEX: 31.43 KG/M2 | WEIGHT: 195.6 LBS

## 2025-08-27 DIAGNOSIS — E66.811 CLASS 1 OBESITY WITHOUT SERIOUS COMORBIDITY WITH BODY MASS INDEX (BMI) OF 31.0 TO 31.9 IN ADULT, UNSPECIFIED OBESITY TYPE: Primary | ICD-10-CM

## 2025-08-27 RX ORDER — SEMAGLUTIDE 0.25 MG/.5ML
0.25 INJECTION, SOLUTION SUBCUTANEOUS WEEKLY
Qty: 2 ML | Refills: 0 | Status: SHIPPED | OUTPATIENT
Start: 2025-08-27

## (undated) DEVICE — GLOVE PROTEXIS MICRO 6.0  2D73PM60

## (undated) DEVICE — KIT SURGICAL TURNOVER FVSD-01D

## (undated) DEVICE — SU VICRYL 0 UR-6 27" J603H

## (undated) DEVICE — SOL NACL 0.9% INJ 1000ML BAG 2B1324X

## (undated) DEVICE — CLIP APPLIER ENDO 5MM M/L LIGAMAX EL5ML

## (undated) DEVICE — ESU GROUND PAD UNIVERSAL W/O CORD

## (undated) DEVICE — PREP CHLORAPREP 26ML TINTED ORANGE  260815

## (undated) DEVICE — SOL WATER IRRIG 1000ML BOTTLE 07139-09

## (undated) DEVICE — SU VICRYL 3-0 SH 27" J316H

## (undated) DEVICE — SOL WATER IRRIG 1000ML BOTTLE 2F7114

## (undated) DEVICE — ENDO POUCH UNIV RETRIEVAL SYSTEM INZII 10MM CD001

## (undated) DEVICE — LINEN TOWEL PACK X5 5464

## (undated) DEVICE — GLOVE PROTEXIS BLUE W/NEU-THERA 6.5  2D73EB65

## (undated) DEVICE — ENDO SCOPE WARMER LF TM500

## (undated) DEVICE — SUCTION IRR STRYKERFLOW II W/TIP 250-070-520

## (undated) DEVICE — PACK LAP CHOLE SLC15LCFSD

## (undated) DEVICE — ENDO TROCAR SLEEVE KII Z-THREADED 05X100MM CTS02

## (undated) DEVICE — SU MONOCRYL 4-0 PS-2 18" UND Y496G

## (undated) DEVICE — ENDO TROCAR FIRST ENTRY KII FIOS Z-THRD 05X100MM CTF03

## (undated) DEVICE — ESU HOLDER LAP INST DISP PURPLE LONG 330MM H-PRO-330

## (undated) DEVICE — ENDO TROCAR FIRST ENTRY KII FIOS Z-THRD 12X100MM CTF73

## (undated) DEVICE — SUCTION CANISTER MEDIVAC LINER 3000ML W/LID 65651-530

## (undated) RX ORDER — NEOSTIGMINE METHYLSULFATE 1 MG/ML
VIAL (ML) INJECTION
Status: DISPENSED
Start: 2019-08-21

## (undated) RX ORDER — ONDANSETRON 2 MG/ML
INJECTION INTRAMUSCULAR; INTRAVENOUS
Status: DISPENSED
Start: 2019-08-21

## (undated) RX ORDER — ONDANSETRON 2 MG/ML
INJECTION INTRAMUSCULAR; INTRAVENOUS
Status: DISPENSED
Start: 2021-02-03

## (undated) RX ORDER — HYDROMORPHONE HYDROCHLORIDE 1 MG/ML
INJECTION, SOLUTION INTRAMUSCULAR; INTRAVENOUS; SUBCUTANEOUS
Status: DISPENSED
Start: 2019-08-21

## (undated) RX ORDER — FENTANYL CITRATE 50 UG/ML
INJECTION, SOLUTION INTRAMUSCULAR; INTRAVENOUS
Status: DISPENSED
Start: 2021-02-03

## (undated) RX ORDER — DEXAMETHASONE SODIUM PHOSPHATE 4 MG/ML
INJECTION, SOLUTION INTRA-ARTICULAR; INTRALESIONAL; INTRAMUSCULAR; INTRAVENOUS; SOFT TISSUE
Status: DISPENSED
Start: 2019-08-21

## (undated) RX ORDER — KETOROLAC TROMETHAMINE 30 MG/ML
INJECTION, SOLUTION INTRAMUSCULAR; INTRAVENOUS
Status: DISPENSED
Start: 2019-08-21

## (undated) RX ORDER — FENTANYL CITRATE 50 UG/ML
INJECTION, SOLUTION INTRAMUSCULAR; INTRAVENOUS
Status: DISPENSED
Start: 2019-08-21

## (undated) RX ORDER — HYDROCODONE BITARTRATE AND ACETAMINOPHEN 5; 325 MG/1; MG/1
TABLET ORAL
Status: DISPENSED
Start: 2019-08-21

## (undated) RX ORDER — GLYCOPYRROLATE 0.2 MG/ML
INJECTION, SOLUTION INTRAMUSCULAR; INTRAVENOUS
Status: DISPENSED
Start: 2019-08-21

## (undated) RX ORDER — LIDOCAINE HYDROCHLORIDE 20 MG/ML
INJECTION, SOLUTION EPIDURAL; INFILTRATION; INTRACAUDAL; PERINEURAL
Status: DISPENSED
Start: 2019-08-21

## (undated) RX ORDER — PROPOFOL 10 MG/ML
INJECTION, EMULSION INTRAVENOUS
Status: DISPENSED
Start: 2019-08-21

## (undated) RX ORDER — CLINDAMYCIN PHOSPHATE 900 MG/50ML
INJECTION, SOLUTION INTRAVENOUS
Status: DISPENSED
Start: 2019-08-21